# Patient Record
Sex: MALE | Race: OTHER | HISPANIC OR LATINO | URBAN - METROPOLITAN AREA
[De-identification: names, ages, dates, MRNs, and addresses within clinical notes are randomized per-mention and may not be internally consistent; named-entity substitution may affect disease eponyms.]

---

## 2017-06-27 ENCOUNTER — EMERGENCY (EMERGENCY)
Facility: HOSPITAL | Age: 67
LOS: 0 days | Discharge: HOME | End: 2017-06-27
Attending: EMERGENCY MEDICINE | Admitting: EMERGENCY MEDICINE
Payer: OTHER MISCELLANEOUS

## 2017-06-27 VITALS
RESPIRATION RATE: 16 BRPM | SYSTOLIC BLOOD PRESSURE: 164 MMHG | WEIGHT: 173.94 LBS | HEART RATE: 100 BPM | OXYGEN SATURATION: 97 % | DIASTOLIC BLOOD PRESSURE: 99 MMHG | TEMPERATURE: 99 F

## 2017-06-27 VITALS — HEIGHT: 68 IN

## 2017-06-27 DIAGNOSIS — Z79.82 LONG TERM (CURRENT) USE OF ASPIRIN: ICD-10-CM

## 2017-06-27 DIAGNOSIS — M79.89 OTHER SPECIFIED SOFT TISSUE DISORDERS: ICD-10-CM
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
## 2017-06-27 DIAGNOSIS — S49.91XA UNSPECIFIED INJURY OF RIGHT SHOULDER AND UPPER ARM, INITIAL ENCOUNTER: ICD-10-CM

## 2017-06-27 DIAGNOSIS — S40.021A CONTUSION OF RIGHT UPPER ARM, INITIAL ENCOUNTER: ICD-10-CM

## 2017-06-27 DIAGNOSIS — M79.621 PAIN IN RIGHT UPPER ARM: ICD-10-CM

## 2017-06-27 PROCEDURE — 99283 EMERGENCY DEPT VISIT LOW MDM: CPT

## 2017-06-27 PROCEDURE — 93971 EXTREMITY STUDY: CPT | Mod: 26,RT

## 2017-06-27 PROCEDURE — 73030 X-RAY EXAM OF SHOULDER: CPT | Mod: 26,RT

## 2017-06-27 NOTE — ED STATDOCS - PROGRESS NOTE DETAILS
66 yr. old male PMH: presents to ED with Right Upper extremity pain and bruising aftyer pulling a leaf blower yesterday. Reports he did not hit arm. Seen and examined by attending in Intake. Plan: X-Ray and pain medication. Will F/U with results and re evaluate. Yuridia COOK

## 2017-06-27 NOTE — ED ADULT TRIAGE NOTE - CHIEF COMPLAINT QUOTE
right upper arm bruising. pt states he pulled really hard trying to start a leaf blower yesterday and woke up with the bruising this morning. pt takes ASA.

## 2017-06-27 NOTE — ED STATDOCS - OBJECTIVE STATEMENT
67 y/o M presenting to ED for RUE bruising and pain after pulling a leaf blower yesterday. Pt did not strike RUE against any objects.

## 2017-07-29 ENCOUNTER — EMERGENCY (EMERGENCY)
Facility: HOSPITAL | Age: 67
LOS: 0 days | Discharge: HOME | End: 2017-07-29
Attending: EMERGENCY MEDICINE | Admitting: EMERGENCY MEDICINE
Payer: MEDICAID

## 2017-07-29 VITALS
TEMPERATURE: 98 F | SYSTOLIC BLOOD PRESSURE: 172 MMHG | HEART RATE: 94 BPM | RESPIRATION RATE: 20 BRPM | OXYGEN SATURATION: 100 % | DIASTOLIC BLOOD PRESSURE: 100 MMHG

## 2017-07-29 VITALS — WEIGHT: 173.06 LBS | HEIGHT: 63 IN

## 2017-07-29 DIAGNOSIS — G89.29 OTHER CHRONIC PAIN: ICD-10-CM

## 2017-07-29 DIAGNOSIS — R06.02 SHORTNESS OF BREATH: ICD-10-CM

## 2017-07-29 DIAGNOSIS — M25.562 PAIN IN LEFT KNEE: ICD-10-CM

## 2017-07-29 DIAGNOSIS — Z76.0 ENCOUNTER FOR ISSUE OF REPEAT PRESCRIPTION: ICD-10-CM

## 2017-07-29 DIAGNOSIS — I15.9 SECONDARY HYPERTENSION, UNSPECIFIED: ICD-10-CM

## 2017-07-29 DIAGNOSIS — R94.31 ABNORMAL ELECTROCARDIOGRAM [ECG] [EKG]: ICD-10-CM

## 2017-07-29 LAB
ALBUMIN SERPL ELPH-MCNC: 3.6 G/DL — SIGNIFICANT CHANGE UP (ref 3.3–5)
ALP SERPL-CCNC: 65 U/L — SIGNIFICANT CHANGE UP (ref 40–120)
ALT FLD-CCNC: 18 U/L — SIGNIFICANT CHANGE UP (ref 12–78)
ANION GAP SERPL CALC-SCNC: 6 MMOL/L — SIGNIFICANT CHANGE UP (ref 5–17)
AST SERPL-CCNC: 18 U/L — SIGNIFICANT CHANGE UP (ref 15–37)
BASOPHILS # BLD AUTO: 0.1 K/UL — SIGNIFICANT CHANGE UP (ref 0–0.2)
BASOPHILS NFR BLD AUTO: 1 % — SIGNIFICANT CHANGE UP (ref 0–2)
BILIRUB SERPL-MCNC: 0.5 MG/DL — SIGNIFICANT CHANGE UP (ref 0.2–1.2)
BUN SERPL-MCNC: 17 MG/DL — SIGNIFICANT CHANGE UP (ref 7–23)
CALCIUM SERPL-MCNC: 8.6 MG/DL — SIGNIFICANT CHANGE UP (ref 8.5–10.1)
CHLORIDE SERPL-SCNC: 106 MMOL/L — SIGNIFICANT CHANGE UP (ref 96–108)
CO2 SERPL-SCNC: 26 MMOL/L — SIGNIFICANT CHANGE UP (ref 22–31)
CREAT SERPL-MCNC: 0.91 MG/DL — SIGNIFICANT CHANGE UP (ref 0.5–1.3)
EOSINOPHIL # BLD AUTO: 0.4 K/UL — SIGNIFICANT CHANGE UP (ref 0–0.5)
EOSINOPHIL NFR BLD AUTO: 4.3 % — SIGNIFICANT CHANGE UP (ref 0–6)
GLUCOSE SERPL-MCNC: 100 MG/DL — HIGH (ref 70–99)
HCT VFR BLD CALC: 44.4 % — SIGNIFICANT CHANGE UP (ref 39–50)
HGB BLD-MCNC: 15.3 G/DL — SIGNIFICANT CHANGE UP (ref 13–17)
LYMPHOCYTES # BLD AUTO: 2.7 K/UL — SIGNIFICANT CHANGE UP (ref 1–3.3)
LYMPHOCYTES # BLD AUTO: 28.6 % — SIGNIFICANT CHANGE UP (ref 13–44)
MCHC RBC-ENTMCNC: 29.8 PG — SIGNIFICANT CHANGE UP (ref 27–34)
MCHC RBC-ENTMCNC: 34.4 GM/DL — SIGNIFICANT CHANGE UP (ref 32–36)
MCV RBC AUTO: 86.5 FL — SIGNIFICANT CHANGE UP (ref 80–100)
MONOCYTES # BLD AUTO: 0.9 K/UL — SIGNIFICANT CHANGE UP (ref 0–0.9)
MONOCYTES NFR BLD AUTO: 9.2 % — SIGNIFICANT CHANGE UP (ref 2–14)
NEUTROPHILS # BLD AUTO: 5.3 K/UL — SIGNIFICANT CHANGE UP (ref 1.8–7.4)
NEUTROPHILS NFR BLD AUTO: 57 % — SIGNIFICANT CHANGE UP (ref 43–77)
PLATELET # BLD AUTO: 306 K/UL — SIGNIFICANT CHANGE UP (ref 150–400)
POTASSIUM SERPL-MCNC: 3.9 MMOL/L — SIGNIFICANT CHANGE UP (ref 3.5–5.3)
POTASSIUM SERPL-SCNC: 3.9 MMOL/L — SIGNIFICANT CHANGE UP (ref 3.5–5.3)
PROT SERPL-MCNC: 6.9 GM/DL — SIGNIFICANT CHANGE UP (ref 6–8.3)
RBC # BLD: 5.13 M/UL — SIGNIFICANT CHANGE UP (ref 4.2–5.8)
RBC # FLD: 12.6 % — SIGNIFICANT CHANGE UP (ref 10.3–14.5)
SODIUM SERPL-SCNC: 138 MMOL/L — SIGNIFICANT CHANGE UP (ref 135–145)
WBC # BLD: 9.4 K/UL — SIGNIFICANT CHANGE UP (ref 3.8–10.5)
WBC # FLD AUTO: 9.4 K/UL — SIGNIFICANT CHANGE UP (ref 3.8–10.5)

## 2017-07-29 PROCEDURE — 73562 X-RAY EXAM OF KNEE 3: CPT | Mod: 26,LT

## 2017-07-29 PROCEDURE — 93010 ELECTROCARDIOGRAM REPORT: CPT

## 2017-07-29 PROCEDURE — 99285 EMERGENCY DEPT VISIT HI MDM: CPT

## 2017-07-29 PROCEDURE — 71020: CPT | Mod: 26

## 2017-07-29 RX ORDER — METFORMIN HYDROCHLORIDE 850 MG/1
1 TABLET ORAL
Qty: 0 | Refills: 0 | DISCHARGE
Start: 2017-07-29 | End: 2017-08-28

## 2017-07-29 RX ORDER — GABAPENTIN 400 MG/1
1 CAPSULE ORAL
Qty: 30 | Refills: 0 | OUTPATIENT
Start: 2017-07-29 | End: 2017-08-28

## 2017-07-29 RX ORDER — METFORMIN HYDROCHLORIDE 850 MG/1
1 TABLET ORAL
Qty: 30 | Refills: 0 | OUTPATIENT
Start: 2017-07-29 | End: 2017-08-28

## 2017-07-29 RX ORDER — AMLODIPINE BESYLATE 2.5 MG/1
5 TABLET ORAL ONCE
Qty: 0 | Refills: 0 | Status: COMPLETED | OUTPATIENT
Start: 2017-07-29 | End: 2017-07-29

## 2017-07-29 RX ORDER — AMLODIPINE BESYLATE 2.5 MG/1
2 TABLET ORAL
Qty: 60 | Refills: 0 | OUTPATIENT
Start: 2017-07-29 | End: 2017-08-28

## 2017-07-29 RX ORDER — CHLORTHALIDONE 50 MG
1 TABLET ORAL
Qty: 30 | Refills: 0 | OUTPATIENT
Start: 2017-07-29 | End: 2017-08-28

## 2017-07-29 RX ADMIN — AMLODIPINE BESYLATE 5 MILLIGRAM(S): 2.5 TABLET ORAL at 09:40

## 2017-07-29 NOTE — ED PROVIDER NOTE - CARE PLAN
Principal Discharge DX:	Chronic pain of left knee  Secondary Diagnosis:	Medication refill  Secondary Diagnosis:	Secondary hypertension

## 2017-07-29 NOTE — ED PROVIDER NOTE - MUSCULOSKELETAL, MLM
LLE - scars healed, distal n/v/m intact, no pedal edema.  Able to flex and extend at left knee without pain

## 2017-07-29 NOTE — ED PROVIDER NOTE - OBJECTIVE STATEMENT
[id number] used.  68 yo pt presents for not feeling well and out of his medication.  PMH for htn and hx of plates/metal in left leg.  Pt states his doctor is Dr. Salgado at [facility].  pt went to [facility] for 3 months and has since returned to ED.  Pt not feeling well and tried to call Dr. Salgado, but unable to get appointment.  pt statees that he is our of his medication and that is why he came to ED.  Pt also with chonic pain left leg, but has had a little more pain left knee.  No new trauma, no sick contact.  Nothing makes better/worse.

## 2017-07-29 NOTE — ED ADULT TRIAGE NOTE - CHIEF COMPLAINT QUOTE
sent in from [facility] for shortness of breath, uncontrolled HTN, and abnormal EKG

## 2017-07-29 NOTE — ED ADULT NURSE NOTE - OBJECTIVE STATEMENT
Pt states he is not short of breath but has run out of his medications and needs refills. He also states he has pain in his left foot. BP elevated upon admission, medicated as ordered. Color good

## 2017-09-03 ENCOUNTER — EMERGENCY (EMERGENCY)
Facility: HOSPITAL | Age: 67
LOS: 0 days | Discharge: HOME | End: 2017-09-03
Attending: EMERGENCY MEDICINE | Admitting: EMERGENCY MEDICINE
Payer: MEDICAID

## 2017-09-03 VITALS
OXYGEN SATURATION: 97 % | TEMPERATURE: 99 F | DIASTOLIC BLOOD PRESSURE: 92 MMHG | SYSTOLIC BLOOD PRESSURE: 149 MMHG | HEART RATE: 102 BPM | WEIGHT: 171.08 LBS | HEIGHT: 61.81 IN | RESPIRATION RATE: 19 BRPM

## 2017-09-03 DIAGNOSIS — I10 ESSENTIAL (PRIMARY) HYPERTENSION: ICD-10-CM

## 2017-09-03 DIAGNOSIS — H57.9 UNSPECIFIED DISORDER OF EYE AND ADNEXA: ICD-10-CM

## 2017-09-03 DIAGNOSIS — H11.31 CONJUNCTIVAL HEMORRHAGE, RIGHT EYE: ICD-10-CM

## 2017-09-03 PROCEDURE — 99283 EMERGENCY DEPT VISIT LOW MDM: CPT

## 2017-09-03 NOTE — ED STATDOCS - RIGHT EYE, MLM
blood injection to right medial eye. no pain on EOMI no foreign body. no abrasion noted. vision normal for pt.

## 2017-09-03 NOTE — ED STATDOCS - OBJECTIVE STATEMENT
68 y/o male pmhx HTN presents to ED due to right eye redness since yesterday. Pt yesterday was cutting his hair and beard and got alcohol in his eye. Redness developed later in the eye. Denies fever, vomiting.

## 2017-09-03 NOTE — ED STATDOCS - ATTENDING CONTRIBUTION TO CARE
I, Miguel Rodriguez, performed the initial face to face bedside interview with this patient regarding history of present illness, review of symptoms and relevant past medical, social and family history.  I completed an independent physical examination.  I was the initial provider who evaluated this patient. I have signed out the follow up of any pending tests (i.e. labs, radiological studies) to the ACP.  I have communicated the patient’s plan of care and disposition with the ACP.  The history, relevant review of systems, past medical and surgical history, medical decision making, and physical examination was documented by the scribe in my presence and I attest to the accuracy of the documentation.

## 2017-09-03 NOTE — ED STATDOCS - PROGRESS NOTE DETAILS
68 yo male presents with eye redness yesterday s/p possible alcohol in his eye when cutting his hair and beard. Denies pain, vision impairment or disturbance. Used fluorescein in the right eye to r/o abrasion or FB but was an unremarkable exam. No FB, abrasion, laceration. Will give ophtho f/u and artificial tears. -Deandre Puga PA-C

## 2017-09-03 NOTE — ED STATDOCS - TEMPLATE, MLM
CERTIFICATE OF WORK      February 2, 2017      RE:   Rubén Warefarheen Irwin  [address]  [place] [address]    To whom it may concern:    This is to certify that Rubén Reyes  has been under HERMAN Albrecht's care from 2/2/2017 and is excused from work on 2/2/2017 to 2/3/2017.        SIGNATURE:___________________________________________,   2/2/2017  Lanette Mosher, ALISSON/HERMAN Albrecht   [facility]  [facility]  [address] [place] [id number] [phone]       Abdominal pain VIEW ALL General

## 2017-09-03 NOTE — ED STATDOCS - MEDICAL DECISION MAKING DETAILS
probable subconjunctival hemorrhage. prescribe drops probable subconjunctival hemorrhage. give artificial tears.

## 2017-10-11 ENCOUNTER — APPOINTMENT (OUTPATIENT)
Dept: PULMONOLOGY | Facility: CLINIC | Age: 67
End: 2017-10-11
Payer: MEDICAID

## 2017-10-11 DIAGNOSIS — R06.02 SHORTNESS OF BREATH: ICD-10-CM

## 2017-10-11 PROCEDURE — 94010 BREATHING CAPACITY TEST: CPT

## 2017-10-11 PROCEDURE — 94729 DIFFUSING CAPACITY: CPT

## 2017-10-11 PROCEDURE — 94726 PLETHYSMOGRAPHY LUNG VOLUMES: CPT

## 2018-01-01 ENCOUNTER — OUTPATIENT (OUTPATIENT)
Dept: OUTPATIENT SERVICES | Facility: HOSPITAL | Age: 68
LOS: 1 days | End: 2018-01-01
Payer: MEDICAID

## 2018-01-10 DIAGNOSIS — R69 ILLNESS, UNSPECIFIED: ICD-10-CM

## 2018-06-01 PROCEDURE — G9005: CPT

## 2018-07-01 ENCOUNTER — OUTPATIENT (OUTPATIENT)
Dept: OUTPATIENT SERVICES | Facility: HOSPITAL | Age: 68
LOS: 1 days | End: 2018-07-01
Payer: MEDICAID

## 2018-08-01 PROCEDURE — G9005: CPT

## 2019-03-23 ENCOUNTER — INPATIENT (INPATIENT)
Facility: HOSPITAL | Age: 69
LOS: 9 days | Discharge: HOME HEALTH | End: 2019-04-02
Attending: HOSPITALIST | Admitting: HOSPITALIST
Payer: MEDICAID

## 2019-03-23 VITALS
HEIGHT: 65 IN | DIASTOLIC BLOOD PRESSURE: 140 MMHG | OXYGEN SATURATION: 100 % | RESPIRATION RATE: 22 BRPM | WEIGHT: 160.06 LBS | HEART RATE: 118 BPM | SYSTOLIC BLOOD PRESSURE: 224 MMHG | TEMPERATURE: 98 F

## 2019-03-23 LAB
ALBUMIN SERPL ELPH-MCNC: 3.9 G/DL — SIGNIFICANT CHANGE UP (ref 3.3–5)
ALP SERPL-CCNC: 67 U/L — SIGNIFICANT CHANGE UP (ref 40–120)
ALT FLD-CCNC: 16 U/L — SIGNIFICANT CHANGE UP (ref 12–78)
ANION GAP SERPL CALC-SCNC: 8 MMOL/L — SIGNIFICANT CHANGE UP (ref 5–17)
APPEARANCE UR: CLEAR — SIGNIFICANT CHANGE UP
APTT BLD: 30.9 SEC — SIGNIFICANT CHANGE UP (ref 27.5–36.3)
AST SERPL-CCNC: 19 U/L — SIGNIFICANT CHANGE UP (ref 15–37)
BACTERIA # UR AUTO: ABNORMAL
BILIRUB SERPL-MCNC: 0.6 MG/DL — SIGNIFICANT CHANGE UP (ref 0.2–1.2)
BILIRUB UR-MCNC: NEGATIVE — SIGNIFICANT CHANGE UP
BUN SERPL-MCNC: 13 MG/DL — SIGNIFICANT CHANGE UP (ref 7–23)
CALCIUM SERPL-MCNC: 9 MG/DL — SIGNIFICANT CHANGE UP (ref 8.5–10.1)
CHLORIDE SERPL-SCNC: 104 MMOL/L — SIGNIFICANT CHANGE UP (ref 96–108)
CK SERPL-CCNC: 85 U/L — SIGNIFICANT CHANGE UP (ref 26–308)
CO2 SERPL-SCNC: 25 MMOL/L — SIGNIFICANT CHANGE UP (ref 22–31)
COLOR SPEC: YELLOW — SIGNIFICANT CHANGE UP
CREAT SERPL-MCNC: 1.03 MG/DL — SIGNIFICANT CHANGE UP (ref 0.5–1.3)
DIFF PNL FLD: NEGATIVE — SIGNIFICANT CHANGE UP
EPI CELLS # UR: SIGNIFICANT CHANGE UP
GLUCOSE BLDC GLUCOMTR-MCNC: 102 MG/DL — HIGH (ref 70–99)
GLUCOSE BLDC GLUCOMTR-MCNC: 106 MG/DL — HIGH (ref 70–99)
GLUCOSE SERPL-MCNC: 119 MG/DL — HIGH (ref 70–99)
GLUCOSE UR QL: NEGATIVE MG/DL — SIGNIFICANT CHANGE UP
HCT VFR BLD CALC: 39.5 % — SIGNIFICANT CHANGE UP (ref 39–50)
HGB BLD-MCNC: 13.3 G/DL — SIGNIFICANT CHANGE UP (ref 13–17)
INR BLD: 1.01 RATIO — SIGNIFICANT CHANGE UP (ref 0.88–1.16)
KETONES UR-MCNC: NEGATIVE — SIGNIFICANT CHANGE UP
LEUKOCYTE ESTERASE UR-ACNC: NEGATIVE — SIGNIFICANT CHANGE UP
LIDOCAIN IGE QN: 163 U/L — SIGNIFICANT CHANGE UP (ref 73–393)
MCHC RBC-ENTMCNC: 29.9 PG — SIGNIFICANT CHANGE UP (ref 27–34)
MCHC RBC-ENTMCNC: 33.7 GM/DL — SIGNIFICANT CHANGE UP (ref 32–36)
MCV RBC AUTO: 88.8 FL — SIGNIFICANT CHANGE UP (ref 80–100)
NITRITE UR-MCNC: NEGATIVE — SIGNIFICANT CHANGE UP
NRBC # BLD: 0 /100 WBCS — SIGNIFICANT CHANGE UP (ref 0–0)
PH UR: 6 — SIGNIFICANT CHANGE UP (ref 5–8)
PLATELET # BLD AUTO: 349 K/UL — SIGNIFICANT CHANGE UP (ref 150–400)
POTASSIUM SERPL-MCNC: 3.8 MMOL/L — SIGNIFICANT CHANGE UP (ref 3.5–5.3)
POTASSIUM SERPL-SCNC: 3.8 MMOL/L — SIGNIFICANT CHANGE UP (ref 3.5–5.3)
PROT SERPL-MCNC: 7.8 GM/DL — SIGNIFICANT CHANGE UP (ref 6–8.3)
PROT UR-MCNC: 30 MG/DL
PROTHROM AB SERPL-ACNC: 11.2 SEC — SIGNIFICANT CHANGE UP (ref 10–12.9)
RBC # BLD: 4.45 M/UL — SIGNIFICANT CHANGE UP (ref 4.2–5.8)
RBC # FLD: 14.6 % — HIGH (ref 10.3–14.5)
RBC CASTS # UR COMP ASSIST: NEGATIVE /HPF — SIGNIFICANT CHANGE UP (ref 0–4)
SODIUM SERPL-SCNC: 137 MMOL/L — SIGNIFICANT CHANGE UP (ref 135–145)
SP GR SPEC: 1.01 — SIGNIFICANT CHANGE UP (ref 1.01–1.02)
TROPONIN I SERPL-MCNC: 0.03 NG/ML — SIGNIFICANT CHANGE UP (ref 0.01–0.04)
TROPONIN I SERPL-MCNC: <0.015 NG/ML — SIGNIFICANT CHANGE UP (ref 0.01–0.04)
UROBILINOGEN FLD QL: NEGATIVE MG/DL — SIGNIFICANT CHANGE UP
WBC # BLD: 11.02 K/UL — HIGH (ref 3.8–10.5)
WBC # FLD AUTO: 11.02 K/UL — HIGH (ref 3.8–10.5)
WBC UR QL: SIGNIFICANT CHANGE UP

## 2019-03-23 PROCEDURE — 70450 CT HEAD/BRAIN W/O DYE: CPT | Mod: 26

## 2019-03-23 PROCEDURE — 93010 ELECTROCARDIOGRAM REPORT: CPT

## 2019-03-23 PROCEDURE — 74176 CT ABD & PELVIS W/O CONTRAST: CPT | Mod: 26

## 2019-03-23 PROCEDURE — 99285 EMERGENCY DEPT VISIT HI MDM: CPT | Mod: 25

## 2019-03-23 PROCEDURE — 71045 X-RAY EXAM CHEST 1 VIEW: CPT | Mod: 26

## 2019-03-23 RX ORDER — DEXTROSE 50 % IN WATER 50 %
25 SYRINGE (ML) INTRAVENOUS ONCE
Qty: 0 | Refills: 0 | Status: DISCONTINUED | OUTPATIENT
Start: 2019-03-23 | End: 2019-04-02

## 2019-03-23 RX ORDER — LABETALOL HCL 100 MG
10 TABLET ORAL ONCE
Qty: 0 | Refills: 0 | Status: COMPLETED | OUTPATIENT
Start: 2019-03-23 | End: 2019-03-23

## 2019-03-23 RX ORDER — DEXTROSE 50 % IN WATER 50 %
15 SYRINGE (ML) INTRAVENOUS ONCE
Qty: 0 | Refills: 0 | Status: DISCONTINUED | OUTPATIENT
Start: 2019-03-23 | End: 2019-04-02

## 2019-03-23 RX ORDER — KETOROLAC TROMETHAMINE 30 MG/ML
30 SYRINGE (ML) INJECTION ONCE
Qty: 0 | Refills: 0 | Status: DISCONTINUED | OUTPATIENT
Start: 2019-03-23 | End: 2019-03-23

## 2019-03-23 RX ORDER — SODIUM CHLORIDE 9 MG/ML
1000 INJECTION, SOLUTION INTRAVENOUS
Qty: 0 | Refills: 0 | Status: DISCONTINUED | OUTPATIENT
Start: 2019-03-23 | End: 2019-04-02

## 2019-03-23 RX ORDER — GABAPENTIN 400 MG/1
100 CAPSULE ORAL AT BEDTIME
Qty: 0 | Refills: 0 | Status: DISCONTINUED | OUTPATIENT
Start: 2019-03-23 | End: 2019-04-02

## 2019-03-23 RX ORDER — MORPHINE SULFATE 50 MG/1
4 CAPSULE, EXTENDED RELEASE ORAL ONCE
Qty: 0 | Refills: 0 | Status: DISCONTINUED | OUTPATIENT
Start: 2019-03-23 | End: 2019-03-23

## 2019-03-23 RX ORDER — HYDRALAZINE HCL 50 MG
10 TABLET ORAL ONCE
Qty: 0 | Refills: 0 | Status: COMPLETED | OUTPATIENT
Start: 2019-03-23 | End: 2019-03-23

## 2019-03-23 RX ORDER — METOPROLOL TARTRATE 50 MG
25 TABLET ORAL DAILY
Qty: 0 | Refills: 0 | Status: DISCONTINUED | OUTPATIENT
Start: 2019-03-23 | End: 2019-03-24

## 2019-03-23 RX ORDER — AMLODIPINE BESYLATE 2.5 MG/1
5 TABLET ORAL DAILY
Qty: 0 | Refills: 0 | Status: DISCONTINUED | OUTPATIENT
Start: 2019-03-23 | End: 2019-04-02

## 2019-03-23 RX ORDER — SODIUM CHLORIDE 9 MG/ML
1000 INJECTION INTRAMUSCULAR; INTRAVENOUS; SUBCUTANEOUS ONCE
Qty: 0 | Refills: 0 | Status: COMPLETED | OUTPATIENT
Start: 2019-03-23 | End: 2019-03-23

## 2019-03-23 RX ORDER — CEFTRIAXONE 500 MG/1
1 INJECTION, POWDER, FOR SOLUTION INTRAMUSCULAR; INTRAVENOUS ONCE
Qty: 0 | Refills: 0 | Status: DISCONTINUED | OUTPATIENT
Start: 2019-03-23 | End: 2019-03-23

## 2019-03-23 RX ORDER — GLUCAGON INJECTION, SOLUTION 0.5 MG/.1ML
1 INJECTION, SOLUTION SUBCUTANEOUS ONCE
Qty: 0 | Refills: 0 | Status: DISCONTINUED | OUTPATIENT
Start: 2019-03-23 | End: 2019-04-02

## 2019-03-23 RX ORDER — CEFTRIAXONE 500 MG/1
1000 INJECTION, POWDER, FOR SOLUTION INTRAMUSCULAR; INTRAVENOUS ONCE
Qty: 0 | Refills: 0 | Status: COMPLETED | OUTPATIENT
Start: 2019-03-23 | End: 2019-03-23

## 2019-03-23 RX ORDER — AMLODIPINE BESYLATE 2.5 MG/1
5 TABLET ORAL ONCE
Qty: 0 | Refills: 0 | Status: COMPLETED | OUTPATIENT
Start: 2019-03-23 | End: 2019-03-23

## 2019-03-23 RX ORDER — HYDROCHLOROTHIAZIDE 25 MG
25 TABLET ORAL DAILY
Qty: 0 | Refills: 0 | Status: DISCONTINUED | OUTPATIENT
Start: 2019-03-23 | End: 2019-03-25

## 2019-03-23 RX ORDER — DEXTROSE 50 % IN WATER 50 %
12.5 SYRINGE (ML) INTRAVENOUS ONCE
Qty: 0 | Refills: 0 | Status: DISCONTINUED | OUTPATIENT
Start: 2019-03-23 | End: 2019-04-02

## 2019-03-23 RX ORDER — INSULIN LISPRO 100/ML
VIAL (ML) SUBCUTANEOUS
Qty: 0 | Refills: 0 | Status: DISCONTINUED | OUTPATIENT
Start: 2019-03-23 | End: 2019-04-02

## 2019-03-23 RX ORDER — HEPARIN SODIUM 5000 [USP'U]/ML
5000 INJECTION INTRAVENOUS; SUBCUTANEOUS EVERY 12 HOURS
Qty: 0 | Refills: 0 | Status: DISCONTINUED | OUTPATIENT
Start: 2019-03-23 | End: 2019-04-02

## 2019-03-23 RX ORDER — INFLUENZA VIRUS VACCINE 15; 15; 15; 15 UG/.5ML; UG/.5ML; UG/.5ML; UG/.5ML
0.5 SUSPENSION INTRAMUSCULAR ONCE
Qty: 0 | Refills: 0 | Status: COMPLETED | OUTPATIENT
Start: 2019-03-23 | End: 2019-04-01

## 2019-03-23 RX ADMIN — CEFTRIAXONE 1000 MILLIGRAM(S): 500 INJECTION, POWDER, FOR SOLUTION INTRAMUSCULAR; INTRAVENOUS at 08:00

## 2019-03-23 RX ADMIN — MORPHINE SULFATE 4 MILLIGRAM(S): 50 CAPSULE, EXTENDED RELEASE ORAL at 13:17

## 2019-03-23 RX ADMIN — SODIUM CHLORIDE 1000 MILLILITER(S): 9 INJECTION INTRAMUSCULAR; INTRAVENOUS; SUBCUTANEOUS at 08:00

## 2019-03-23 RX ADMIN — Medication 30 MILLIGRAM(S): at 08:00

## 2019-03-23 RX ADMIN — HEPARIN SODIUM 5000 UNIT(S): 5000 INJECTION INTRAVENOUS; SUBCUTANEOUS at 18:13

## 2019-03-23 RX ADMIN — AMLODIPINE BESYLATE 5 MILLIGRAM(S): 2.5 TABLET ORAL at 18:14

## 2019-03-23 RX ADMIN — Medication 10 MILLIGRAM(S): at 08:39

## 2019-03-23 RX ADMIN — AMLODIPINE BESYLATE 5 MILLIGRAM(S): 2.5 TABLET ORAL at 08:00

## 2019-03-23 RX ADMIN — GABAPENTIN 100 MILLIGRAM(S): 400 CAPSULE ORAL at 21:35

## 2019-03-23 RX ADMIN — Medication 30 MILLIGRAM(S): at 08:15

## 2019-03-23 RX ADMIN — Medication 25 MILLIGRAM(S): at 18:14

## 2019-03-23 RX ADMIN — MORPHINE SULFATE 4 MILLIGRAM(S): 50 CAPSULE, EXTENDED RELEASE ORAL at 06:54

## 2019-03-23 RX ADMIN — MORPHINE SULFATE 4 MILLIGRAM(S): 50 CAPSULE, EXTENDED RELEASE ORAL at 07:10

## 2019-03-23 RX ADMIN — Medication 10 MILLIGRAM(S): at 06:53

## 2019-03-23 RX ADMIN — SODIUM CHLORIDE 1000 MILLILITER(S): 9 INJECTION INTRAMUSCULAR; INTRAVENOUS; SUBCUTANEOUS at 09:00

## 2019-03-23 RX ADMIN — MORPHINE SULFATE 4 MILLIGRAM(S): 50 CAPSULE, EXTENDED RELEASE ORAL at 13:43

## 2019-03-23 RX ADMIN — Medication 25 MILLIGRAM(S): at 10:35

## 2019-03-23 RX ADMIN — Medication 10 MILLIGRAM(S): at 10:35

## 2019-03-23 NOTE — ED PROVIDER NOTE - OBJECTIVE STATEMENT
68 y/o male in ED c/o worsening dysuria, suprapubic pain x 3 days now with retention tonight.   pt denies any previous episodes.   pt denies any fever, HA, cp, sob, n/v/d or flank pain.   states [place] from [place] with [place] relief.  states burning pain

## 2019-03-23 NOTE — ED ADULT NURSE NOTE - OBJECTIVE STATEMENT
pt presents to ed a/ox3, with complaint of pain when urinating. pt reports pain started 2-3days ago. pt reports constant burning at tip of penis and burning while urinating. pt denies penial discharge, rash, abnormal findings. pt urine light yellow clear, and no sediments noted. pt denies fever,chills, n/v/d, sob, CP. pt reports hx of HTN, DM. pt hypertensive in ED, pt states he did not take his medications today, unsure of what medication he takes at home. upon assessment no abnormal findings , pt has tenderness at suprapubic area upon palpation.

## 2019-03-23 NOTE — H&P ADULT - ASSESSMENT
70 yo male with Hx. of HTN, DM2, who presented in the ER for  dysuria, L flank pain , suprapubic pain for 3 days.  He was foung with  severe HTN in the ER and referred for telemetry admission.   Assessment:                        1.Uncontrolled HTN                       2. Hydronephrosis                       3. DM2                       4. Cerebral Ischemia,    Plan:    admit to : telemetry                medications as per med rec. received Bp meds and Ceftriaxone in the ED. , resume home medications.                Insulin Coverage..                Labs: order cbc,bmp,                Radilogy:               Cardiac diagnostics:                Consults: ID, Urology, Neurology.

## 2019-03-23 NOTE — H&P ADULT - HISTORY OF PRESENT ILLNESS
HPI: The patient is a 70 yo male with Hx. of HTN, DM2, who presented in the ER for  dysuria, L flank pain , suprapubic pain for 3 days.  He was foung with  severe HTN in the ER and referred for telemetry admission.     PMHx: HTN, DM2,    PSHx: LLE    Family Hx:the patient Mother   of uterine CA,  The patient father  of prostate CA.     Social Hx.: not smoking, no alcohol use    ROS:     Eyes: no changes in vision    ENT/Mouth: no changes    Cardiovascular: no chest pain    Respiratory: no SOB    Gastrointestinal: no diarrhea, no nausea,     Genitourinary: has  dysuria,     Breast: no pain    Musculoskeletal: no pain    Integumentary: no itching    Neurological: No Headache, no tremor,    Psychiatric: no suicidal ideations    Endocrine: no excessive thirst,     Hematologic/Lymphatic: no swollen glands    Allergic/Immunologic: no itching    Physical Exam:Physical Exam: Vital Signs Last 24 Hrs  T(C): 36.7 (23 Mar 2019 13:09), Max: 36.7 (23 Mar 2019 05:55)  T(F): 98.1 (23 Mar 2019 13:09), Max: 98.1 (23 Mar 2019 13:09)  HR: 92 (23 Mar 2019 15:22) (89 - 118)  BP: 160/109 (23 Mar 2019 15:22) (146/103 - 224/140)  BP(mean): --  RR: 16 (23 Mar 2019 15:22) (15 - 24)  SpO2: 95% (23 Mar 2019 15:22) (94% - 100%)        HEENT: PRRL EOMI    MOUTH/TEETH/GUMS: Clear    NECK: no JVD    LUNGS: Clear    HEART: S1,S2 RR    ABDOMEN: soft nontender    EXTREMITIES: no pedal edema,     MUSCULOSKELETAL: no joint swelling,     NEURO: no tremor, no focal signs.    SKIN: no rash    : CVA negative,     Lab:                       13.3   11.02 )-----------( 349      ( 23 Mar 2019 06:45 )             39.5       03-    137  |  104  |  13  ----------------------------<  119<H>  3.8   |  25  |  1.03    Ca    9.0      23 Mar 2019 06:45    TPro  7.8  /  Alb  3.9  /  TBili  0.6  /  DBili  x   /  AST  19  /  ALT  16  /  AlkPhos  67  03-23    UA  wbc 0-2, Rbc neg,  bact. Occ.   CTH: Mild encephalomalacia and gliosis in the LEFT frontal and   temporal lobes, likely sequela of prior trauma. Mild anterior   periventricular and bifrontal some cortical white matter ischemia is   noted.   Small retention cyst in RIGHT sphenoid sinus.    CT Abdomen/Pelvis:  Mild bilateral hydroureteronephrosis without significant perinephric   stranding. 3 mm bladder stone near the right UVJ,? Recently passed versus   chronic. Perivesicular fat stranding concerning for cystitis. Correlation   with urinalysis is recommended.

## 2019-03-23 NOTE — ED ADULT NURSE REASSESSMENT NOTE - NS ED NURSE REASSESS COMMENT FT1
Patient A&Ox4, resting in bed. Hypertensive, reports severe groin & penis pain; MD Montalvo notified, new orders received. Hospitalist consult pending. Safety & comfort measures in place, family bedside. Will continue to monitor.

## 2019-03-23 NOTE — ED ADULT NURSE NOTE - NSIMPLEMENTINTERV_GEN_ALL_ED
Implemented All Universal Safety Interventions:  [place] to call system. Call bell, personal items and telephone within reach. Instruct patient to call for assistance. Room bathroom lighting operational. Non-slip footwear when patient is off stretcher. Physically safe environment: no spills, clutter or unnecessary equipment. Stretcher in lowest position, wheels locked, appropriate side rails in place.

## 2019-03-23 NOTE — ED PROVIDER NOTE - CLINICAL SUMMARY MEDICAL DECISION MAKING FREE TEXT BOX
70 y/o male in ED c/o abd pain with dysuria and retention possible uti vs stone.   will check CT, labs, UA, meds and reeval

## 2019-03-23 NOTE — ED PROVIDER NOTE - PROGRESS NOTE DETAILS
updated pt and family with kidney stone, pt bp remains elevated despite medication will admit pt for hypertensive urgency, spoke with hosptialist Dr. Arti Montalvo DO

## 2019-03-24 DIAGNOSIS — I10 ESSENTIAL (PRIMARY) HYPERTENSION: ICD-10-CM

## 2019-03-24 DIAGNOSIS — N23 UNSPECIFIED RENAL COLIC: ICD-10-CM

## 2019-03-24 DIAGNOSIS — N15.9 RENAL TUBULO-INTERSTITIAL DISEASE, UNSPECIFIED: ICD-10-CM

## 2019-03-24 DIAGNOSIS — R06.09 OTHER FORMS OF DYSPNEA: ICD-10-CM

## 2019-03-24 LAB
ANION GAP SERPL CALC-SCNC: 12 MMOL/L — SIGNIFICANT CHANGE UP (ref 5–17)
BASOPHILS # BLD AUTO: 0.05 K/UL — SIGNIFICANT CHANGE UP (ref 0–0.2)
BASOPHILS NFR BLD AUTO: 0.4 % — SIGNIFICANT CHANGE UP (ref 0–2)
BUN SERPL-MCNC: 14 MG/DL — SIGNIFICANT CHANGE UP (ref 7–23)
CALCIUM SERPL-MCNC: 8.1 MG/DL — LOW (ref 8.5–10.1)
CHLORIDE SERPL-SCNC: 102 MMOL/L — SIGNIFICANT CHANGE UP (ref 96–108)
CO2 SERPL-SCNC: 23 MMOL/L — SIGNIFICANT CHANGE UP (ref 22–31)
CREAT SERPL-MCNC: 1.38 MG/DL — HIGH (ref 0.5–1.3)
EOSINOPHIL # BLD AUTO: 0.2 K/UL — SIGNIFICANT CHANGE UP (ref 0–0.5)
EOSINOPHIL NFR BLD AUTO: 1.7 % — SIGNIFICANT CHANGE UP (ref 0–6)
GLUCOSE BLDC GLUCOMTR-MCNC: 113 MG/DL — HIGH (ref 70–99)
GLUCOSE BLDC GLUCOMTR-MCNC: 115 MG/DL — HIGH (ref 70–99)
GLUCOSE BLDC GLUCOMTR-MCNC: 116 MG/DL — HIGH (ref 70–99)
GLUCOSE BLDC GLUCOMTR-MCNC: 169 MG/DL — HIGH (ref 70–99)
GLUCOSE SERPL-MCNC: 104 MG/DL — HIGH (ref 70–99)
HBA1C BLD-MCNC: 5.7 % — HIGH (ref 4–5.6)
HCT VFR BLD CALC: 36.6 % — LOW (ref 39–50)
HCV AB S/CO SERPL IA: 0.09 S/CO — SIGNIFICANT CHANGE UP (ref 0–0.79)
HCV AB SERPL-IMP: SIGNIFICANT CHANGE UP
HGB BLD-MCNC: 12.5 G/DL — LOW (ref 13–17)
IMM GRANULOCYTES NFR BLD AUTO: 0.8 % — SIGNIFICANT CHANGE UP (ref 0–1.5)
LYMPHOCYTES # BLD AUTO: 1.6 K/UL — SIGNIFICANT CHANGE UP (ref 1–3.3)
LYMPHOCYTES # BLD AUTO: 13.3 % — SIGNIFICANT CHANGE UP (ref 13–44)
MCHC RBC-ENTMCNC: 29.8 PG — SIGNIFICANT CHANGE UP (ref 27–34)
MCHC RBC-ENTMCNC: 34.2 GM/DL — SIGNIFICANT CHANGE UP (ref 32–36)
MCV RBC AUTO: 87.4 FL — SIGNIFICANT CHANGE UP (ref 80–100)
MONOCYTES # BLD AUTO: 0.89 K/UL — SIGNIFICANT CHANGE UP (ref 0–0.9)
MONOCYTES NFR BLD AUTO: 7.4 % — SIGNIFICANT CHANGE UP (ref 2–14)
NEUTROPHILS # BLD AUTO: 9.18 K/UL — HIGH (ref 1.8–7.4)
NEUTROPHILS NFR BLD AUTO: 76.4 % — SIGNIFICANT CHANGE UP (ref 43–77)
NRBC # BLD: 0 /100 WBCS — SIGNIFICANT CHANGE UP (ref 0–0)
PLATELET # BLD AUTO: 315 K/UL — SIGNIFICANT CHANGE UP (ref 150–400)
POTASSIUM SERPL-MCNC: 3 MMOL/L — LOW (ref 3.5–5.3)
POTASSIUM SERPL-SCNC: 3 MMOL/L — LOW (ref 3.5–5.3)
RBC # BLD: 4.19 M/UL — LOW (ref 4.2–5.8)
RBC # FLD: 14.6 % — HIGH (ref 10.3–14.5)
SODIUM SERPL-SCNC: 137 MMOL/L — SIGNIFICANT CHANGE UP (ref 135–145)
WBC # BLD: 12.02 K/UL — HIGH (ref 3.8–10.5)
WBC # FLD AUTO: 12.02 K/UL — HIGH (ref 3.8–10.5)

## 2019-03-24 PROCEDURE — 99252 IP/OBS CONSLTJ NEW/EST SF 35: CPT

## 2019-03-24 PROCEDURE — 99222 1ST HOSP IP/OBS MODERATE 55: CPT

## 2019-03-24 RX ORDER — PIPERACILLIN AND TAZOBACTAM 4; .5 G/20ML; G/20ML
3.38 INJECTION, POWDER, LYOPHILIZED, FOR SOLUTION INTRAVENOUS EVERY 8 HOURS
Qty: 0 | Refills: 0 | Status: DISCONTINUED | OUTPATIENT
Start: 2019-03-24 | End: 2019-03-25

## 2019-03-24 RX ORDER — METOPROLOL TARTRATE 50 MG
50 TABLET ORAL DAILY
Qty: 0 | Refills: 0 | Status: DISCONTINUED | OUTPATIENT
Start: 2019-03-25 | End: 2019-04-02

## 2019-03-24 RX ORDER — METOPROLOL TARTRATE 50 MG
25 TABLET ORAL ONCE
Qty: 0 | Refills: 0 | Status: COMPLETED | OUTPATIENT
Start: 2019-03-24 | End: 2019-03-24

## 2019-03-24 RX ORDER — METOPROLOL TARTRATE 50 MG
25 TABLET ORAL DAILY
Qty: 0 | Refills: 0 | Status: DISCONTINUED | OUTPATIENT
Start: 2019-03-24 | End: 2019-03-24

## 2019-03-24 RX ORDER — POTASSIUM CHLORIDE 20 MEQ
40 PACKET (EA) ORAL EVERY 4 HOURS
Qty: 0 | Refills: 0 | Status: COMPLETED | OUTPATIENT
Start: 2019-03-24 | End: 2019-03-24

## 2019-03-24 RX ORDER — PIPERACILLIN AND TAZOBACTAM 4; .5 G/20ML; G/20ML
3.38 INJECTION, POWDER, LYOPHILIZED, FOR SOLUTION INTRAVENOUS ONCE
Qty: 0 | Refills: 0 | Status: COMPLETED | OUTPATIENT
Start: 2019-03-24 | End: 2019-03-24

## 2019-03-24 RX ORDER — SODIUM CHLORIDE 9 MG/ML
1000 INJECTION INTRAMUSCULAR; INTRAVENOUS; SUBCUTANEOUS
Qty: 0 | Refills: 0 | Status: DISCONTINUED | OUTPATIENT
Start: 2019-03-24 | End: 2019-03-26

## 2019-03-24 RX ORDER — ACETAMINOPHEN 500 MG
650 TABLET ORAL EVERY 6 HOURS
Qty: 0 | Refills: 0 | Status: DISCONTINUED | OUTPATIENT
Start: 2019-03-24 | End: 2019-04-02

## 2019-03-24 RX ORDER — CEFTRIAXONE 500 MG/1
1000 INJECTION, POWDER, FOR SOLUTION INTRAMUSCULAR; INTRAVENOUS EVERY 24 HOURS
Qty: 0 | Refills: 0 | Status: DISCONTINUED | OUTPATIENT
Start: 2019-03-24 | End: 2019-03-24

## 2019-03-24 RX ADMIN — Medication 25 MILLIGRAM(S): at 18:03

## 2019-03-24 RX ADMIN — PIPERACILLIN AND TAZOBACTAM 200 GRAM(S): 4; .5 INJECTION, POWDER, LYOPHILIZED, FOR SOLUTION INTRAVENOUS at 18:52

## 2019-03-24 RX ADMIN — GABAPENTIN 100 MILLIGRAM(S): 400 CAPSULE ORAL at 21:36

## 2019-03-24 RX ADMIN — Medication 650 MILLIGRAM(S): at 18:53

## 2019-03-24 RX ADMIN — Medication 25 MILLIGRAM(S): at 05:30

## 2019-03-24 RX ADMIN — HEPARIN SODIUM 5000 UNIT(S): 5000 INJECTION INTRAVENOUS; SUBCUTANEOUS at 05:30

## 2019-03-24 RX ADMIN — Medication 40 MILLIEQUIVALENT(S): at 15:34

## 2019-03-24 RX ADMIN — Medication 40 MILLIEQUIVALENT(S): at 11:16

## 2019-03-24 RX ADMIN — CEFTRIAXONE 1000 MILLIGRAM(S): 500 INJECTION, POWDER, FOR SOLUTION INTRAMUSCULAR; INTRAVENOUS at 11:14

## 2019-03-24 RX ADMIN — PIPERACILLIN AND TAZOBACTAM 25 GRAM(S): 4; .5 INJECTION, POWDER, LYOPHILIZED, FOR SOLUTION INTRAVENOUS at 21:36

## 2019-03-24 RX ADMIN — AMLODIPINE BESYLATE 5 MILLIGRAM(S): 2.5 TABLET ORAL at 05:30

## 2019-03-24 RX ADMIN — HEPARIN SODIUM 5000 UNIT(S): 5000 INJECTION INTRAVENOUS; SUBCUTANEOUS at 17:58

## 2019-03-24 RX ADMIN — SODIUM CHLORIDE 85 MILLILITER(S): 9 INJECTION INTRAMUSCULAR; INTRAVENOUS; SUBCUTANEOUS at 11:03

## 2019-03-24 NOTE — CONSULT NOTE ADULT - SUBJECTIVE AND OBJECTIVE BOX
Patient is a 69y old  Male who presents with a chief complaint of HTN, urine retention, hydronephrosis, bladder stone, (24 Mar 2019 10:04)    HPI:  HPI: The patient is a 70 yo male with Hx. of HTN, DM2, who presented in the ER for dysuria, L flank pain, suprapubic pain for 3 days.  He was found with  severe HTN in the ER and referred for telemetry admission. also found with wbc ct 11-12, UA unremarkable CT abd/pelvis with perivesicular fat stranding, cystitis, b/l hydroureteronephrosis, 3 mm bladder stone in R UVJ ? passed stone was given IV rocephin for UTI.     PMHx: HTN, DM2,    PSH: as above  Meds: per reconciliation sheet, noted below  MEDICATIONS  (STANDING):  amLODIPine   Tablet 5 milliGRAM(s) Oral daily  cefTRIAXone Injectable. 1000 milliGRAM(s) IV Push every 24 hours  dextrose 5%. 1000 milliLiter(s) (50 mL/Hr) IV Continuous <Continuous>  dextrose 50% Injectable 12.5 Gram(s) IV Push once  dextrose 50% Injectable 25 Gram(s) IV Push once  dextrose 50% Injectable 25 Gram(s) IV Push once  gabapentin 100 milliGRAM(s) Oral at bedtime  heparin  Injectable 5000 Unit(s) SubCutaneous every 12 hours  hydrochlorothiazide 25 milliGRAM(s) Oral daily  influenza   Vaccine 0.5 milliLiter(s) IntraMuscular once  insulin lispro (HumaLOG) corrective regimen sliding scale   SubCutaneous three times a day before meals  metoprolol succinate ER 25 milliGRAM(s) Oral daily  potassium chloride    Tablet ER 40 milliEquivalent(s) Oral every 4 hours  sodium chloride 0.9%. 1000 milliLiter(s) (85 mL/Hr) IV Continuous <Continuous>    Allergies    No Known Allergies    Intolerances      Social: no smoking, no alcohol, no illegal drugs; no recent travel, no exposure to TB  FAMILY HISTORY:  No pertinent family history in first degree relatives     no history of premature cardiovascular disease in first degree relatives    ROS: the patient denies fever, no chills, no HA, no dizziness, no sore throat, no blurry vision, no CP, no palpitations, no SOB, no cough, no abdominal pain, no diarrhea, no leg pain, no claudication, no rash, no joint aches, no rectal pain or bleeding, no night sweats  All other systems reviewed and are negative    Vital Signs Last 24 Hrs  T(C): 37.6 (24 Mar 2019 10:09), Max: 37.6 (24 Mar 2019 10:09)  T(F): 99.6 (24 Mar 2019 10:09), Max: 99.6 (24 Mar 2019 10:09)  HR: 99 (24 Mar 2019 10:09) (89 - 106)  BP: 157/88 (24 Mar 2019 10:09) (143/90 - 178/93)  BP(mean): --  RR: 18 (24 Mar 2019 10:09) (15 - 18)  SpO2: 91% (24 Mar 2019 10:09) (91% - 98%)  Daily     Daily     PE:    Constitutional: frail looking  HEENT: NC/AT, EOMI, PERRLA, conjunctivae clear; ears and nose atraumatic; pharynx benign  Neck: supple; thyroid not palpable  Back: no tenderness  Respiratory: respiratory effort normal; clear to auscultation  Cardiovascular: S1S2 regular, no murmurs  Abdomen: soft, not tender, not distended, positive BS; liver and spleen WNL  Genitourinary:  suprapubic tenderness  Lymphatic: no LN palpable  Musculoskeletal: no muscle tenderness, no joint swelling or tenderness  Extremities: no pedal edema  Neurological/ Psychiatric: moving all extremities  Skin: no rashes; no palpable lesions    Labs: all available labs reviewed                        12.5   12.02 )-----------( 315      ( 24 Mar 2019 05:29 )             36.6     03-24    137  |  102  |  14  ----------------------------<  104<H>  3.0<L>   |  23  |  1.38<H>    Ca    8.1<L>      24 Mar 2019 05:29    TPro  7.8  /  Alb  3.9  /  TBili  0.6  /  DBili  x   /  AST  19  /  ALT  16  /  AlkPhos  67  03-23     LIVER FUNCTIONS - ( 23 Mar 2019 06:45 )  Alb: 3.9 g/dL / Pro: 7.8 gm/dL / ALK PHOS: 67 U/L / ALT: 16 U/L / AST: 19 U/L / GGT: x           Urinalysis Basic - ( 23 Mar 2019 06:45 )    Color: Yellow / Appearance: Clear / S.015 / pH: x  Gluc: x / Ketone: Negative  / Bili: Negative / Urobili: Negative mg/dL   Blood: x / Protein: 30 mg/dL / Nitrite: Negative   Leuk Esterase: Negative / RBC: Negative /HPF / WBC 0-2   Sq Epi: x / Non Sq Epi: Occasional / Bacteria: Occasional          Radiology: all available radiological tests reviewed    EXAM:  CT BRAIN                            PROCEDURE DATE:  2019          INTERPRETATION:      CT head without IV contrast        CLINICAL INFORMATION:        Headache hypertension    TECHNIQUE: Contiguous axial 5 mm sections were obtained through the head.   Sagittal and coronal 2-D reformatted images were also obtained.   This   scan was performed using automatic exposure control (radiation dose   reduction software) to obtain a diagnostic image quality scan with   patient dose as low as reasonably achievable.     FINDINGS:   No previous examinations are available for review.    The brain demonstrates encephalomalacia and gliosis in the LEFT frontal   and temporal lobes, likely sequela of prior trauma. Mild anterior   periventricular and bifrontal some cortical white matter ischemia is   noted.   No acute cerebral cortical infarct is seen.  No intracranial   hemorrhage is found.  No mass effect is found in the brain.      The ventricles, sulci and basal cisterns appear unremarkable.         The orbits are unremarkable.  The paranasal sinuses are significant for   small retention cyst in the RIGHT sphenoid sinus.  The nasal cavity   appears intact.  The nasopharynx is symmetric.  The central skull base,   petrous temporal bones and calvarium remain intact.      IMPRESSION:   Mild encephalomalacia and gliosis in the LEFT frontal and   temporal lobes, likely sequela of prior trauma. Mild anterior   periventricular and bifrontal some cortical white matter ischemia is   noted.   Small retention cyst in RIGHT sphenoid sinus.      < from: CT Abdomen and Pelvis No Cont (19 @ 07:30) >  EXAM:  CT ABDOMEN AND PELVIS                            PROCEDURE DATE:  2019          INTERPRETATION:  HISTORY: Pain and difficulty urinating. Evaluate for   stone.    TECHNIQUE: CT scan of the abdomen and pelvis was performed from the domes  of the diaphragm to the symphysis pubis without oral or intravenous   contrast.  Coronal and sagittal reformatted images are provided.    COMPARISON: There is no prior study for comparison.    FINDINGS:   The lung bases are clear. The heart is not enlarged.    Evaluation of the parenchymal organs and vascular structures is limited   without intravenous contrast.     There is mild right hydroureteronephrosis secondary to a 3 mm stone near   the UVJ within the bladder. There is mild left hydroureteronephrosis   without obstructing ureteral calculus. There is no significant   perinephric stranding. The unenhanced appearance of the liver,   gallbladder, spleen, pancreas and adrenal glands is unremarkable. Small   left renal cyst is present.    Is no bowel obstruction, free air or ascites. The appendix is normal.   There is descending and sigmoid colon diverticulosis without evidence of   diverticulitis.    The abdominal aorta is normal in caliber. There is no retroperitoneal,   pelvic or inguinal adenopathy.     Mild the bladder is not thick-walled there is perivesicular fat stranding   suggesting inflammation. The prostate gland is enlarged.    There are no acute osseous abnormalities. There our degenerative changes   in the lumbar spine.    IMPRESSION:   Mild bilateral hydroureteronephrosis without significant perinephric   stranding. 3 mm bladder stone near the right UVJ,? Recently passed versus   chronic. Perivesicular fat stranding concerning for cystitis. Correlation   with urinalysis is recommended.          Advanced directives addressed: full resuscitation

## 2019-03-24 NOTE — CONSULT NOTE ADULT - ASSESSMENT
70 yo male with Hx. of HTN, DM2, who presented in the ER for dysuria, L flank pain, suprapubic pain for 3 days.  He was found with  severe HTN in the ER and referred for telemetry admission. also found with wbc ct 11-12, UA unremarkable CT abd/pelvis with perivesicular fat stranding, cystitis, b/l hydroureteronephrosis, 3 mm bladder stone in R UVJ ? passed stone was given IV rocephin for UTI.     1. cystitis. bladder stone. urinary retention. edilma  - agree with rocephin 1gmqdaily   - ? passed stone, slowly improving  - f/u urine cx/blood cx  - monitor temps  - tolerating abx well so far; no side effects noted  - reason for abx use and side effects reviewed with patient  - supportive care  - f/u cbc    2. other issues - HTN/DM - care per medicine

## 2019-03-24 NOTE — PROGRESS NOTE ADULT - SUBJECTIVE AND OBJECTIVE BOX
CHIEF COMPLAINT/Diagnosis:     SUBJECTIVE:     REVIEW OF SYSTEMS:    CONSTITUTIONAL: No weakness, fevers or chills  EYES/ENT: No visual changes;  No vertigo or throat pain   NECK: No pain or stiffness  RESPIRATORY: No cough, wheezing, hemoptysis; No shortness of breath  CARDIOVASCULAR: No chest pain or palpitations  GASTROINTESTINAL: No abdominal or epigastric pain. No nausea, vomiting, or hematemesis; No diarrhea or constipation. No melena or hematochezia.  GENITOURINARY: No dysuria, frequency or hematuria  NEUROLOGICAL: No numbness or weakness  SKIN: No itching, burning, rashes, or lesions   All other review of systems is negative unless indicated above    Vital Signs Last 24 Hrs  T(C): 36.6 (24 Mar 2019 05:15), Max: 37.1 (23 Mar 2019 16:52)  T(F): 97.9 (24 Mar 2019 05:15), Max: 98.7 (23 Mar 2019 16:52)  HR: 106 (24 Mar 2019 06:04) (89 - 106)  BP: 156/88 (24 Mar 2019 06:04) (143/90 - 178/93)  BP(mean): --  RR: 18 (24 Mar 2019 05:15) (15 - 18)  SpO2: 94% (24 Mar 2019 05:15) (94% - 98%)    I&O's Summary    23 Mar 2019 07:01  -  24 Mar 2019 07:00  --------------------------------------------------------  IN: 1000 mL / OUT: 300 mL / NET: 700 mL        CAPILLARY BLOOD GLUCOSE      POCT Blood Glucose.: 113 mg/dL (24 Mar 2019 08:02)  POCT Blood Glucose.: 106 mg/dL (23 Mar 2019 21:41)  POCT Blood Glucose.: 102 mg/dL (23 Mar 2019 16:49)      PHYSICAL EXAM:    Constitutional: NAD, awake and alert, well-developed  HEENT: PERR, EOMI, Normal Hearing, MMM  Neck: Soft and supple, No LAD, No JVD  Respiratory: Breath sounds are clear bilaterally, No wheezing, rales or rhonchi  Cardiovascular: S1 and S2, regular rate and rhythm, no Murmurs, gallops or rubs  Gastrointestinal: Bowel Sounds present, soft, nontender, nondistended, no guarding, no rebound  Extremities: No peripheral edema  Vascular: 2+ peripheral pulses  Neurological: A/O x 3, no focal deficits  Musculoskeletal: 5/5 strength b/l upper and lower extremities  Skin: No rashes    MEDICATIONS:  MEDICATIONS  (STANDING):  amLODIPine   Tablet 5 milliGRAM(s) Oral daily  cefTRIAXone Injectable. 1000 milliGRAM(s) IV Push every 24 hours  dextrose 5%. 1000 milliLiter(s) (50 mL/Hr) IV Continuous <Continuous>  dextrose 50% Injectable 12.5 Gram(s) IV Push once  dextrose 50% Injectable 25 Gram(s) IV Push once  dextrose 50% Injectable 25 Gram(s) IV Push once  gabapentin 100 milliGRAM(s) Oral at bedtime  heparin  Injectable 5000 Unit(s) SubCutaneous every 12 hours  hydrochlorothiazide 25 milliGRAM(s) Oral daily  influenza   Vaccine 0.5 milliLiter(s) IntraMuscular once  insulin lispro (HumaLOG) corrective regimen sliding scale   SubCutaneous three times a day before meals  metoprolol succinate ER 25 milliGRAM(s) Oral daily      LABS: All Labs Reviewed:                        12.5   12.02 )-----------( 315      ( 24 Mar 2019 05:29 )             36.6     03-24    137  |  102  |  14  ----------------------------<  104<H>  3.0<L>   |  23  |  1.38<H>    Ca    8.1<L>      24 Mar 2019 05:29    TPro  7.8  /  Alb  3.9  /  TBili  0.6  /  DBili  x   /  AST  19  /  ALT  16  /  AlkPhos  67  03-23    PT/INR - ( 23 Mar 2019 06:45 )   PT: 11.2 sec;   INR: 1.01 ratio         PTT - ( 23 Mar 2019 06:45 )  PTT:30.9 sec  CARDIAC MARKERS ( 23 Mar 2019 11:33 )  0.026 ng/mL / x     / 85 U/L / x     / x      CARDIAC MARKERS ( 23 Mar 2019 06:45 )  <0.015 ng/mL / x     / x     / x     / x          Assessment:    70 yo male with Hx. of HTN, DM2, who presented in the ER for  dysuria, L flank pain , suprapubic pain for 3 days.  He was foung with  severe HTN in the ER and referred for telemetry admission.   Assessment:                        1.Uncontrolled HTN                       2. Hydronephrosis                       3. DM2                       4. Cerebral Ischemia,    Plan:    admit to : telemetry                medications as per med rec. received Bp meds and Ceftriaxone in the ED. , resume home medications.                Insulin Coverage..                Labs: order cbc,bmp,                Radilogy:               Cardiac diagnostics:                Consults: ID, Urology, Neurology. CHIEF COMPLAINT/Diagnosis: UTI, possible pyelo, sepsis, obstructive uropathy    SUBJECTIVE: no complaints    REVIEW OF SYSTEMS:    CONSTITUTIONAL: No weakness, fevers or chills  EYES/ENT: No visual changes;  No vertigo or throat pain   NECK: No pain or stiffness  RESPIRATORY: No cough, wheezing, hemoptysis; No shortness of breath  CARDIOVASCULAR: No chest pain or palpitations  GASTROINTESTINAL: No abdominal or epigastric pain. No nausea, vomiting, or hematemesis; No diarrhea or constipation. No melena or hematochezia.  GENITOURINARY: No dysuria, frequency or hematuria  NEUROLOGICAL: No numbness or weakness  SKIN: No itching, burning, rashes, or lesions   All other review of systems is negative unless indicated above    Vital Signs Last 24 Hrs  T(C): 36.6 (24 Mar 2019 05:15), Max: 37.1 (23 Mar 2019 16:52)  T(F): 97.9 (24 Mar 2019 05:15), Max: 98.7 (23 Mar 2019 16:52)  HR: 106 (24 Mar 2019 06:04) (89 - 106)  BP: 156/88 (24 Mar 2019 06:04) (143/90 - 178/93)  BP(mean): --  RR: 18 (24 Mar 2019 05:15) (15 - 18)  SpO2: 94% (24 Mar 2019 05:15) (94% - 98%)    I&O's Summary    23 Mar 2019 07:01  -  24 Mar 2019 07:00  --------------------------------------------------------  IN: 1000 mL / OUT: 300 mL / NET: 700 mL        CAPILLARY BLOOD GLUCOSE      POCT Blood Glucose.: 113 mg/dL (24 Mar 2019 08:02)  POCT Blood Glucose.: 106 mg/dL (23 Mar 2019 21:41)  POCT Blood Glucose.: 102 mg/dL (23 Mar 2019 16:49)      PHYSICAL EXAM:    Constitutional: NAD, awake and alert, well-developed  HEENT: PERR, EOMI, Normal Hearing, MMM  Neck: Soft and supple, No LAD, No JVD  Respiratory: Breath sounds are clear bilaterally, No wheezing, rales or rhonchi  Cardiovascular: S1 and S2, regular rate and rhythm, no Murmurs, gallops or rubs  Gastrointestinal: Bowel Sounds present, soft, nontender, nondistended, no guarding, no rebound  Extremities: No peripheral edema  Vascular: 2+ peripheral pulses  Neurological: A/O x 3, no focal deficits  Musculoskeletal: 5/5 strength b/l upper and lower extremities  Skin: No rashes    MEDICATIONS:  MEDICATIONS  (STANDING):  amLODIPine   Tablet 5 milliGRAM(s) Oral daily  cefTRIAXone Injectable. 1000 milliGRAM(s) IV Push every 24 hours  dextrose 5%. 1000 milliLiter(s) (50 mL/Hr) IV Continuous <Continuous>  dextrose 50% Injectable 12.5 Gram(s) IV Push once  dextrose 50% Injectable 25 Gram(s) IV Push once  dextrose 50% Injectable 25 Gram(s) IV Push once  gabapentin 100 milliGRAM(s) Oral at bedtime  heparin  Injectable 5000 Unit(s) SubCutaneous every 12 hours  hydrochlorothiazide 25 milliGRAM(s) Oral daily  influenza   Vaccine 0.5 milliLiter(s) IntraMuscular once  insulin lispro (HumaLOG) corrective regimen sliding scale   SubCutaneous three times a day before meals  metoprolol succinate ER 25 milliGRAM(s) Oral daily      LABS: All Labs Reviewed:                        12.5   12.02 )-----------( 315      ( 24 Mar 2019 05:29 )             36.6     03-24    137  |  102  |  14  ----------------------------<  104<H>  3.0<L>   |  23  |  1.38<H>    Ca    8.1<L>      24 Mar 2019 05:29    TPro  7.8  /  Alb  3.9  /  TBili  0.6  /  DBili  x   /  AST  19  /  ALT  16  /  AlkPhos  67  03-23    PT/INR - ( 23 Mar 2019 06:45 )   PT: 11.2 sec;   INR: 1.01 ratio         PTT - ( 23 Mar 2019 06:45 )  PTT:30.9 sec  CARDIAC MARKERS ( 23 Mar 2019 11:33 )  0.026 ng/mL / x     / 85 U/L / x     / x      CARDIAC MARKERS ( 23 Mar 2019 06:45 )  <0.015 ng/mL / x     / x     / x     / x          Assessment:    68 yo male with Hx. of HTN, DM2, who presented in the ER for  dysuria, L flank pain , suprapubic pain for 3 days.  He was foung with  severe HTN in the ER     1-sepsis secondary to UTI w/ possible pyelo secondary to obstructie uropathy  -spiked temp on 101.9 on iv ceffriaxoine  -no urine culture sent in ER  -will change to zosyn; infectious disease consult  -stat blood cultures and urine cultures  -iv hydration    2-unconbtrolled htn  -increase Toprolol to 50mg  -c/w amloidipine and hydrochlorthizide    3-dvt prophy- sc heparin
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
## 2019-03-24 NOTE — CONSULT NOTE ADULT - SUBJECTIVE AND OBJECTIVE BOX
CHIEF COMPLAINT:Patient with possible stone in ureter    HISTORY OF PRESENT ILLNESS:Urine negative, no stranding    PAST MEDICAL & SURGICAL HISTORY:  Hypertension, unspecified type  No significant past surgical history      REVIEW OF SYSTEMS:    CONSTITUTIONAL: No weakness, fevers or chills  EYES/ENT: No visual changes;  No vertigo or throat pain   NECK: No pain or stiffness  RESPIRATORY: No cough, wheezing, hemoptysis; No shortness of breath  CARDIOVASCULAR: No chest pain or palpitations  GASTROINTESTINAL: No abdominal or epigastric pain. No nausea, vomiting, or hematemesis; No diarrhea or constipation. No melena or hematochezia.  GENITOURINARY: No dysuria, frequency or hematuria  NEUROLOGICAL: No numbness or weakness  SKIN: No itching, burning, rashes, or lesions   All other review of systems is negative unless indicated above.    MEDICATIONS  (STANDING):  amLODIPine   Tablet 5 milliGRAM(s) Oral daily  dextrose 5%. 1000 milliLiter(s) (50 mL/Hr) IV Continuous <Continuous>  dextrose 50% Injectable 12.5 Gram(s) IV Push once  dextrose 50% Injectable 25 Gram(s) IV Push once  dextrose 50% Injectable 25 Gram(s) IV Push once  gabapentin 100 milliGRAM(s) Oral at bedtime  heparin  Injectable 5000 Unit(s) SubCutaneous every 12 hours  hydrochlorothiazide 25 milliGRAM(s) Oral daily  influenza   Vaccine 0.5 milliLiter(s) IntraMuscular once  insulin lispro (HumaLOG) corrective regimen sliding scale   SubCutaneous three times a day before meals  metoprolol succinate ER 25 milliGRAM(s) Oral daily  piperacillin/tazobactam IVPB. 3.375 Gram(s) IV Intermittent once  piperacillin/tazobactam IVPB. 3.375 Gram(s) IV Intermittent every 8 hours  sodium chloride 0.9%. 1000 milliLiter(s) (85 mL/Hr) IV Continuous <Continuous>    MEDICATIONS  (PRN):  dextrose 40% Gel 15 Gram(s) Oral once PRN Blood Glucose LESS THAN 70 milliGRAM(s)/deciliter  glucagon  Injectable 1 milliGRAM(s) IntraMuscular once PRN Glucose LESS THAN 70 milligrams/deciliter      Allergies    No Known Allergies    Intolerances        SOCIAL HISTORY:    FAMILY HISTORY:  No pertinent family history in first degree relatives      Vital Signs Last 24 Hrs  T(C): 38.8 (24 Mar 2019 16:54), Max: 38.8 (24 Mar 2019 16:54)  T(F): 101.9 (24 Mar 2019 16:54), Max: 101.9 (24 Mar 2019 16:54)  HR: 115 (24 Mar 2019 16:54) (95 - 115)  BP: 145/102 (24 Mar 2019 16:54) (143/90 - 178/93)  BP(mean): --  RR: 18 (24 Mar 2019 10:09) (18 - 18)  SpO2: 95% (24 Mar 2019 16:54) (91% - 95%)    PHYSICAL EXAM:    Constitutional: NAD, well-developed  HEENT: BEV, EOMI, Normal Hearing, MMM  Neck: No LAD, No JVD  Back: Normal spine flexure, No CVA tenderness  Respiratory: CTAB   Cardiovascular: S1 and S2, RRR, no M/G/R  Abd: BS+, soft, NT/ND, No CVAT  : Normal phallus,open meatus,bilateral descended testes, no masses  CHERYL: Normal prostate, no masses  Extremities: No peripheral edema  Vascular: 2+ peripheral pulses  Neurological: A/O x 3, no focal deficits  Psychiatric: Normal mood, normal affect  Musculoskeletal: 5/5 strength b/l upper and lower extremities  Skin: No rashes    LABS:                        12.5   12.02 )-----------( 315      ( 24 Mar 2019 05:29 )             36.6     03-24    137  |  102  |  14  ----------------------------<  104<H>  3.0<L>   |  23  |  1.38<H>    Ca    8.1<L>      24 Mar 2019 05:29    TPro  7.8  /  Alb  3.9  /  TBili  0.6  /  DBili  x   /  AST  19  /  ALT  16  /  AlkPhos  67  03-23    PT/INR - ( 23 Mar 2019 06:45 )   PT: 11.2 sec;   INR: 1.01 ratio         PTT - ( 23 Mar 2019 06:45 )  PTT:30.9 sec  Urinalysis Basic - ( 23 Mar 2019 06:45 )    Color: Yellow / Appearance: Clear / S.015 / pH: x  Gluc: x / Ketone: Negative  / Bili: Negative / Urobili: Negative mg/dL   Blood: x / Protein: 30 mg/dL / Nitrite: Negative   Leuk Esterase: Negative / RBC: Negative /HPF / WBC 0-2   Sq Epi: x / Non Sq Epi: Occasional / Bacteria: Occasional      Urine Culture:     RADIOLOGY & ADDITIONAL STUDIES:

## 2019-03-24 NOTE — CONSULT NOTE ADULT - PROBLEM SELECTOR RECOMMENDATION 9
The stone may have passed, no evidence for infection.  I will return PRN or see patient in the office
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
## 2019-03-25 LAB
ALBUMIN SERPL ELPH-MCNC: 3 G/DL — LOW (ref 3.3–5)
ANION GAP SERPL CALC-SCNC: 10 MMOL/L — SIGNIFICANT CHANGE UP (ref 5–17)
ANION GAP SERPL CALC-SCNC: 11 MMOL/L — SIGNIFICANT CHANGE UP (ref 5–17)
APPEARANCE UR: CLEAR — SIGNIFICANT CHANGE UP
BACTERIA # UR AUTO: ABNORMAL
BILIRUB UR-MCNC: NEGATIVE — SIGNIFICANT CHANGE UP
BUN SERPL-MCNC: 24 MG/DL — HIGH (ref 7–23)
BUN SERPL-MCNC: 27 MG/DL — HIGH (ref 7–23)
CALCIUM SERPL-MCNC: 8.1 MG/DL — LOW (ref 8.5–10.1)
CALCIUM SERPL-MCNC: 8.2 MG/DL — LOW (ref 8.5–10.1)
CHLORIDE SERPL-SCNC: 104 MMOL/L — SIGNIFICANT CHANGE UP (ref 96–108)
CHLORIDE SERPL-SCNC: 106 MMOL/L — SIGNIFICANT CHANGE UP (ref 96–108)
CO2 SERPL-SCNC: 23 MMOL/L — SIGNIFICANT CHANGE UP (ref 22–31)
CO2 SERPL-SCNC: 23 MMOL/L — SIGNIFICANT CHANGE UP (ref 22–31)
COLOR SPEC: YELLOW — SIGNIFICANT CHANGE UP
CREAT ?TM UR-MCNC: 36 MG/DL — SIGNIFICANT CHANGE UP
CREAT SERPL-MCNC: 2.13 MG/DL — HIGH (ref 0.5–1.3)
CREAT SERPL-MCNC: 2.15 MG/DL — HIGH (ref 0.5–1.3)
DIFF PNL FLD: ABNORMAL
EPI CELLS # UR: NEGATIVE — SIGNIFICANT CHANGE UP
GLUCOSE BLDC GLUCOMTR-MCNC: 108 MG/DL — HIGH (ref 70–99)
GLUCOSE BLDC GLUCOMTR-MCNC: 114 MG/DL — HIGH (ref 70–99)
GLUCOSE BLDC GLUCOMTR-MCNC: 128 MG/DL — HIGH (ref 70–99)
GLUCOSE BLDC GLUCOMTR-MCNC: 165 MG/DL — HIGH (ref 70–99)
GLUCOSE SERPL-MCNC: 110 MG/DL — HIGH (ref 70–99)
GLUCOSE SERPL-MCNC: 113 MG/DL — HIGH (ref 70–99)
GLUCOSE UR QL: NEGATIVE MG/DL — SIGNIFICANT CHANGE UP
HCT VFR BLD CALC: 33.6 % — LOW (ref 39–50)
HGB BLD-MCNC: 11.7 G/DL — LOW (ref 13–17)
KETONES UR-MCNC: NEGATIVE — SIGNIFICANT CHANGE UP
LEUKOCYTE ESTERASE UR-ACNC: ABNORMAL
MCHC RBC-ENTMCNC: 30.5 PG — SIGNIFICANT CHANGE UP (ref 27–34)
MCHC RBC-ENTMCNC: 34.8 GM/DL — SIGNIFICANT CHANGE UP (ref 32–36)
MCV RBC AUTO: 87.5 FL — SIGNIFICANT CHANGE UP (ref 80–100)
NITRITE UR-MCNC: NEGATIVE — SIGNIFICANT CHANGE UP
NRBC # BLD: 0 /100 WBCS — SIGNIFICANT CHANGE UP (ref 0–0)
PH UR: 5 — SIGNIFICANT CHANGE UP (ref 5–8)
PHOSPHATE SERPL-MCNC: 3.8 MG/DL — SIGNIFICANT CHANGE UP (ref 2.5–4.5)
PLATELET # BLD AUTO: 284 K/UL — SIGNIFICANT CHANGE UP (ref 150–400)
POTASSIUM SERPL-MCNC: 3.7 MMOL/L — SIGNIFICANT CHANGE UP (ref 3.5–5.3)
POTASSIUM SERPL-MCNC: 3.8 MMOL/L — SIGNIFICANT CHANGE UP (ref 3.5–5.3)
POTASSIUM SERPL-SCNC: 3.7 MMOL/L — SIGNIFICANT CHANGE UP (ref 3.5–5.3)
POTASSIUM SERPL-SCNC: 3.8 MMOL/L — SIGNIFICANT CHANGE UP (ref 3.5–5.3)
PROT ?TM UR-MCNC: 10 MG/DL — SIGNIFICANT CHANGE UP (ref 0–12)
PROT UR-MCNC: NEGATIVE MG/DL — SIGNIFICANT CHANGE UP
PROT/CREAT UR-RTO: 0.3 RATIO — HIGH (ref 0–0.2)
RBC # BLD: 3.84 M/UL — LOW (ref 4.2–5.8)
RBC # FLD: 14.6 % — HIGH (ref 10.3–14.5)
RBC CASTS # UR COMP ASSIST: ABNORMAL /HPF (ref 0–4)
SODIUM SERPL-SCNC: 138 MMOL/L — SIGNIFICANT CHANGE UP (ref 135–145)
SODIUM SERPL-SCNC: 139 MMOL/L — SIGNIFICANT CHANGE UP (ref 135–145)
SODIUM UR-SCNC: 86 MMOL/L — SIGNIFICANT CHANGE UP
SP GR SPEC: 1.01 — SIGNIFICANT CHANGE UP (ref 1.01–1.02)
UROBILINOGEN FLD QL: NEGATIVE MG/DL — SIGNIFICANT CHANGE UP
WBC # BLD: 12.16 K/UL — HIGH (ref 3.8–10.5)
WBC # FLD AUTO: 12.16 K/UL — HIGH (ref 3.8–10.5)
WBC UR QL: SIGNIFICANT CHANGE UP

## 2019-03-25 PROCEDURE — 76770 US EXAM ABDO BACK WALL COMP: CPT | Mod: 26

## 2019-03-25 PROCEDURE — 93306 TTE W/DOPPLER COMPLETE: CPT | Mod: 26

## 2019-03-25 RX ORDER — TAMSULOSIN HYDROCHLORIDE 0.4 MG/1
0.4 CAPSULE ORAL AT BEDTIME
Qty: 0 | Refills: 0 | Status: DISCONTINUED | OUTPATIENT
Start: 2019-03-25 | End: 2019-04-02

## 2019-03-25 RX ORDER — CEFTRIAXONE 500 MG/1
1000 INJECTION, POWDER, FOR SOLUTION INTRAMUSCULAR; INTRAVENOUS EVERY 24 HOURS
Qty: 0 | Refills: 0 | Status: DISCONTINUED | OUTPATIENT
Start: 2019-03-25 | End: 2019-03-26

## 2019-03-25 RX ADMIN — Medication 1: at 18:30

## 2019-03-25 RX ADMIN — Medication 25 MILLIGRAM(S): at 05:07

## 2019-03-25 RX ADMIN — CEFTRIAXONE 1000 MILLIGRAM(S): 500 INJECTION, POWDER, FOR SOLUTION INTRAMUSCULAR; INTRAVENOUS at 12:20

## 2019-03-25 RX ADMIN — PIPERACILLIN AND TAZOBACTAM 25 GRAM(S): 4; .5 INJECTION, POWDER, LYOPHILIZED, FOR SOLUTION INTRAVENOUS at 05:07

## 2019-03-25 RX ADMIN — AMLODIPINE BESYLATE 5 MILLIGRAM(S): 2.5 TABLET ORAL at 05:07

## 2019-03-25 RX ADMIN — Medication 50 MILLIGRAM(S): at 05:07

## 2019-03-25 RX ADMIN — TAMSULOSIN HYDROCHLORIDE 0.4 MILLIGRAM(S): 0.4 CAPSULE ORAL at 21:34

## 2019-03-25 RX ADMIN — HEPARIN SODIUM 5000 UNIT(S): 5000 INJECTION INTRAVENOUS; SUBCUTANEOUS at 17:58

## 2019-03-25 RX ADMIN — GABAPENTIN 100 MILLIGRAM(S): 400 CAPSULE ORAL at 21:34

## 2019-03-25 RX ADMIN — HEPARIN SODIUM 5000 UNIT(S): 5000 INJECTION INTRAVENOUS; SUBCUTANEOUS at 05:07

## 2019-03-25 NOTE — PROGRESS NOTE ADULT - ASSESSMENT
70 yo male with Hx. of HTN, DM2, who presented in the ER for dysuria, L flank pain, suprapubic pain for 3 days.  He was found with  severe HTN in the ER and referred for telemetry admission. also found with wbc ct 11-12, UA unremarkable CT abd/pelvis with perivesicular fat stranding, cystitis, b/l hydroureteronephrosis, 3 mm bladder stone in R UVJ ? passed stone was given IV rocephin for UTI.     1. cystitis. bladder stone. urinary retention. edilma  - improving but Cr up monitor for retention  - urology eval noted  - on rocephin 1gmqdaily #3 s/p zosyn   - ? passed stone, slowly improving  - continue with abx coverage   - f/u urine cx/blood cx  - tailor abx based on cx results  - monitor temps  - tolerating abx well so far; no side effects noted  - reason for abx use and side effects reviewed with patient  - f/u cbc    2. other issues - HTN/DM - care per medicine

## 2019-03-25 NOTE — PROGRESS NOTE ADULT - SUBJECTIVE AND OBJECTIVE BOX
Date of service: 19 @ 14:26    pt seen and examined   feels better  fevers down  no dysuria or pain    ROS: no fever or chills; denies dizziness, no HA, no SOB or cough, no abdominal pain, no diarrhea or constipation; no legs pain, no rashes    MEDICATIONS  (STANDING):  amLODIPine   Tablet 5 milliGRAM(s) Oral daily  cefTRIAXone Injectable. 1000 milliGRAM(s) IV Push every 24 hours  dextrose 5%. 1000 milliLiter(s) (50 mL/Hr) IV Continuous <Continuous>  dextrose 50% Injectable 12.5 Gram(s) IV Push once  dextrose 50% Injectable 25 Gram(s) IV Push once  dextrose 50% Injectable 25 Gram(s) IV Push once  gabapentin 100 milliGRAM(s) Oral at bedtime  heparin  Injectable 5000 Unit(s) SubCutaneous every 12 hours  influenza   Vaccine 0.5 milliLiter(s) IntraMuscular once  insulin lispro (HumaLOG) corrective regimen sliding scale   SubCutaneous three times a day before meals  metoprolol succinate ER 50 milliGRAM(s) Oral daily  sodium chloride 0.9%. 1000 milliLiter(s) (85 mL/Hr) IV Continuous <Continuous>      Vital Signs Last 24 Hrs  T(C): 37.3 (25 Mar 2019 11:37), Max: 38.8 (24 Mar 2019 16:54)  T(F): 99.2 (25 Mar 2019 11:37), Max: 101.9 (24 Mar 2019 16:54)  HR: 98 (25 Mar 2019 11:37) (91 - 115)  BP: 156/87 (25 Mar 2019 11:37) (128/77 - 158/89)  BP(mean): --  RR: 16 (25 Mar 2019 11:37) (16 - 18)  SpO2: 94% (25 Mar 2019 11:37) (94% - 97%)      PE:  Constitutional: frail looking  HEENT: NC/AT, EOMI, PERRLA, conjunctivae clear; ears and nose atraumatic; pharynx benign  Neck: supple; thyroid not palpable  Back: no tenderness  Respiratory: respiratory effort normal; clear to auscultation  Cardiovascular: S1S2 regular, no murmurs  Abdomen: soft, not tender, not distended, positive BS; liver and spleen WNL  Genitourinary:  suprapubic tenderness  Lymphatic: no LN palpable  Musculoskeletal: no muscle tenderness, no joint swelling or tenderness  Extremities: no pedal edema  Neurological/ Psychiatric: moving all extremities  Skin: no rashes; no palpable lesions    Labs: all available labs reviewed                                   11.7   12.16 )-----------( 284      ( 25 Mar 2019 05:45 )             33.6     03-    138  |  104  |  24<H>  ----------------------------<  113<H>  3.8   |  23  |  2.15<H>    Ca    8.1<L>      25 Mar 2019 05:45          Urinalysis Basic - ( 23 Mar 2019 06:45 )    Color: Yellow / Appearance: Clear / S.015 / pH: x  Gluc: x / Ketone: Negative  / Bili: Negative / Urobili: Negative mg/dL   Blood: x / Protein: 30 mg/dL / Nitrite: Negative   Leuk Esterase: Negative / RBC: Negative /HPF / WBC 0-2   Sq Epi: x / Non Sq Epi: Occasional / Bacteria: Occasional          Radiology: all available radiological tests reviewed    EXAM:  CT BRAIN                            PROCEDURE DATE:  2019          INTERPRETATION:      CT head without IV contrast        CLINICAL INFORMATION:        Headache hypertension    TECHNIQUE: Contiguous axial 5 mm sections were obtained through the head.   Sagittal and coronal 2-D reformatted images were also obtained.   This   scan was performed using automatic exposure control (radiation dose   reduction software) to obtain a diagnostic image quality scan with   patient dose as low as reasonably achievable.     FINDINGS:   No previous examinations are available for review.    The brain demonstrates encephalomalacia and gliosis in the LEFT frontal   and temporal lobes, likely sequela of prior trauma. Mild anterior   periventricular and bifrontal some cortical white matter ischemia is   noted.   No acute cerebral cortical infarct is seen.  No intracranial   hemorrhage is found.  No mass effect is found in the brain.      The ventricles, sulci and basal cisterns appear unremarkable.         The orbits are unremarkable.  The paranasal sinuses are significant for   small retention cyst in the RIGHT sphenoid sinus.  The nasal cavity   appears intact.  The nasopharynx is symmetric.  The central skull base,   petrous temporal bones and calvarium remain intact.      IMPRESSION:   Mild encephalomalacia and gliosis in the LEFT frontal and   temporal lobes, likely sequela of prior trauma. Mild anterior   periventricular and bifrontal some cortical white matter ischemia is   noted.   Small retention cyst in RIGHT sphenoid sinus.      < from: CT Abdomen and Pelvis No Cont (19 @ 07:30) >  EXAM:  CT ABDOMEN AND PELVIS                            PROCEDURE DATE:  2019          INTERPRETATION:  HISTORY: Pain and difficulty urinating. Evaluate for   stone.    TECHNIQUE: CT scan of the abdomen and pelvis was performed from the domes  of the diaphragm to the symphysis pubis without oral or intravenous   contrast.  Coronal and sagittal reformatted images are provided.    COMPARISON: There is no prior study for comparison.    FINDINGS:   The lung bases are clear. The heart is not enlarged.    Evaluation of the parenchymal organs and vascular structures is limited   without intravenous contrast.     There is mild right hydroureteronephrosis secondary to a 3 mm stone near   the UVJ within the bladder. There is mild left hydroureteronephrosis   without obstructing ureteral calculus. There is no significant   perinephric stranding. The unenhanced appearance of the liver,   gallbladder, spleen, pancreas and adrenal glands is unremarkable. Small   left renal cyst is present.    Is no bowel obstruction, free air or ascites. The appendix is normal.   There is descending and sigmoid colon diverticulosis without evidence of   diverticulitis.    The abdominal aorta is normal in caliber. There is no retroperitoneal,   pelvic or inguinal adenopathy.     Mild the bladder is not thick-walled there is perivesicular fat stranding   suggesting inflammation. The prostate gland is enlarged.    There are no acute osseous abnormalities. There our degenerative changes   in the lumbar spine.    IMPRESSION:   Mild bilateral hydroureteronephrosis without significant perinephric   stranding. 3 mm bladder stone near the right UVJ,? Recently passed versus   chronic. Perivesicular fat stranding concerning for cystitis. Correlation   with urinalysis is recommended.          Advanced directives addressed: full resuscitation

## 2019-03-25 NOTE — PROGRESS NOTE ADULT - SUBJECTIVE AND OBJECTIVE BOX
CHIEF COMPLAINT/Diagnosis: sepsis secondary to UTI/ urinary retention/ BPH    SUBJECTIVE: no complaints    REVIEW OF SYSTEMS:    CONSTITUTIONAL: No weakness, fevers or chills  EYES/ENT: No visual changes;  No vertigo or throat pain   NECK: No pain or stiffness  RESPIRATORY: No cough, wheezing, hemoptysis; No shortness of breath  CARDIOVASCULAR: No chest pain or palpitations  GASTROINTESTINAL: No abdominal or epigastric pain. No nausea, vomiting, or hematemesis; No diarrhea or constipation. No melena or hematochezia.  GENITOURINARY: No dysuria, frequency or hematuria  NEUROLOGICAL: No numbness or weakness  SKIN: No itching, burning, rashes, or lesions   All other review of systems is negative unless indicated above    Vital Signs Last 24 Hrs  T(C): 37.3 (25 Mar 2019 11:37), Max: 38.1 (24 Mar 2019 19:33)  T(F): 99.2 (25 Mar 2019 11:37), Max: 100.6 (24 Mar 2019 19:33)  HR: 98 (25 Mar 2019 11:37) (91 - 103)  BP: 156/87 (25 Mar 2019 11:37) (128/77 - 158/89)  BP(mean): --  RR: 16 (25 Mar 2019 11:37) (16 - 18)  SpO2: 94% (25 Mar 2019 11:37) (94% - 97%)    I&O's Summary    25 Mar 2019 07:01  -  25 Mar 2019 16:56  --------------------------------------------------------  IN: 240 mL / OUT: 1 mL / NET: 239 mL        CAPILLARY BLOOD GLUCOSE      POCT Blood Glucose.: 108 mg/dL (25 Mar 2019 12:18)  POCT Blood Glucose.: 114 mg/dL (25 Mar 2019 08:10)  POCT Blood Glucose.: 169 mg/dL (24 Mar 2019 21:35)  POCT Blood Glucose.: 116 mg/dL (24 Mar 2019 17:32)      PHYSICAL EXAM:    Constitutional: NAD, awake and alert, well-developed  HEENT: PERR, EOMI, Normal Hearing, MMM  Neck: Soft and supple, No LAD, No JVD  Respiratory: Breath sounds are clear bilaterally, No wheezing, rales or rhonchi  Cardiovascular: S1 and S2, regular rate and rhythm, no Murmurs, gallops or rubs  Gastrointestinal: Bowel Sounds present, soft, nontender, nondistended, no guarding, no rebound  Extremities: No peripheral edema  Vascular: 2+ peripheral pulses  Neurological: A/O x 3, no focal deficits  Musculoskeletal: 5/5 strength b/l upper and lower extremities  Skin: No rashes    MEDICATIONS:  MEDICATIONS  (STANDING):  amLODIPine   Tablet 5 milliGRAM(s) Oral daily  cefTRIAXone Injectable. 1000 milliGRAM(s) IV Push every 24 hours  dextrose 5%. 1000 milliLiter(s) (50 mL/Hr) IV Continuous <Continuous>  dextrose 50% Injectable 12.5 Gram(s) IV Push once  dextrose 50% Injectable 25 Gram(s) IV Push once  dextrose 50% Injectable 25 Gram(s) IV Push once  gabapentin 100 milliGRAM(s) Oral at bedtime  heparin  Injectable 5000 Unit(s) SubCutaneous every 12 hours  influenza   Vaccine 0.5 milliLiter(s) IntraMuscular once  insulin lispro (HumaLOG) corrective regimen sliding scale   SubCutaneous three times a day before meals  metoprolol succinate ER 50 milliGRAM(s) Oral daily  sodium chloride 0.9%. 1000 milliLiter(s) (85 mL/Hr) IV Continuous <Continuous>  tamsulosin 0.4 milliGRAM(s) Oral at bedtime      LABS: All Labs Reviewed:                        11.7   12.16 )-----------( 284      ( 25 Mar 2019 05:45 )             33.6     03-25    139  |  106  |  27<H>  ----------------------------<  110<H>  3.7   |  23  |  2.13<H>    Ca    8.2<L>      25 Mar 2019 15:57  Phos  3.8     03-25    TPro  x   /  Alb  3.0<L>  /  TBili  x   /  DBili  x   /  AST  x   /  ALT  x   /  AlkPhos  x   03-25            Assessment:    70 yo male with Hx. of HTN, DM2, who presented in the ER for  dysuria, L flank pain , suprapubic pain for 3 days.  He was foung with  severe HTN in the ER     1-sepsis secondary to UTI w/ possible pyelo secondary to obstructie uropathy  -spiked temp on 101.9 3/24  -no urine culture sent in ER  - infectious disease consult  -stat blood cultures and urine cultures  -iv hydration  -c/w iv ceftriaxone    2-unconbtrolled htn  -increase Toprolol to 50mg  -c/w amloidipine and hydrochlorthizide    3-dvt prophy- sc heparin    4-acute renal failure secondary to urinary retention  -hussein BPH  -start flomax 0.4mg  -c/w peterson catheter

## 2019-03-25 NOTE — PROGRESS NOTE ADULT - SUBJECTIVE AND OBJECTIVE BOX
The patient is a 70 yo male with Hx. of HTN, DM2, who presented in the ER for  dysuria, L flank pain , suprapubic pain for 3 days.  He was foung with  severe HTN   and possible urinary retention   Since admission has been urinating well.  Ct abd + mild Bilat hydro noted  no peterson was placed  no IV contrast     Today   Cr has been rising  renal eval called for JULIANN     pt feels well   reports no diff urinating or discomfort    PAST MEDICAL & SURGICAL HISTORY:  Hypertension, unspecified type  No significant past surgical history    Home Medications:      MEDICATIONS  (STANDING):  amLODIPine   Tablet 5 milliGRAM(s) Oral daily  cefTRIAXone Injectable. 1000 milliGRAM(s) IV Push every 24 hours  dextrose 5%. 1000 milliLiter(s) (50 mL/Hr) IV Continuous <Continuous>  dextrose 50% Injectable 12.5 Gram(s) IV Push once  dextrose 50% Injectable 25 Gram(s) IV Push once  dextrose 50% Injectable 25 Gram(s) IV Push once  gabapentin 100 milliGRAM(s) Oral at bedtime  heparin  Injectable 5000 Unit(s) SubCutaneous every 12 hours  influenza   Vaccine 0.5 milliLiter(s) IntraMuscular once  insulin lispro (HumaLOG) corrective regimen sliding scale   SubCutaneous three times a day before meals  metoprolol succinate ER 50 milliGRAM(s) Oral daily  sodium chloride 0.9%. 1000 milliLiter(s) (85 mL/Hr) IV Continuous <Continuous>      Allergies    No Known Allergies    Intolerances        SOCIAL HISTORY:  Denies ETOh,Smoking,     FAMILY HISTORY:  No pertinent family history in first degree relatives      REVIEW OF SYSTEMS:    CONSTITUTIONAL: No weakness, fevers or chills  EYES/ENT: No visual changes;  No vertigo or throat pain   NECK: No pain or stiffness  RESPIRATORY: No cough, wheezing, hemoptysis; No shortness of breath  CARDIOVASCULAR: No chest pain or palpitations  GASTROINTESTINAL: No abdominal or epigastric pain. No nausea, vomiting, or hematemesis; No diarrhea or constipation. No melena or hematochezia.  GENITOURINARY: No dysuria, frequency or hematuria  NEUROLOGICAL: No numbness or weakness  SKIN: No itching, burning, rashes, or lesions   All other review of systems is negative unless indicated above.    Vital Signs Last 24 Hrs  T(C): 37.3 (25 Mar 2019 11:37), Max: 38.8 (24 Mar 2019 16:54)  T(F): 99.2 (25 Mar 2019 11:37), Max: 101.9 (24 Mar 2019 16:54)  HR: 98 (25 Mar 2019 11:37) (91 - 115)  BP: 156/87 (25 Mar 2019 11:37) (128/77 - 158/89)  BP(mean): --  RR: 16 (25 Mar 2019 11:37) (16 - 18)  SpO2: 94% (25 Mar 2019 11:37) (94% - 97%)    Wt(kg): --    I and O's:     @ 07:01  -   @ 15:51  --------------------------------------------------------  IN: 240 mL / OUT: 1 mL / NET: 239 mL          PHYSICAL EXAM:    Constitutional: NAD  HEENT: PERRLA, EOMI,  MMM  Neck: No LAD, No JVD  Respiratory: CTAB  Cardiovascular: S1 and S2  Gastrointestinal: BS+, soft, NT/ND  Extremities: No peripheral edema  Neurological: A/O x 3, no focal deficits  Psychiatric: Normal mood, normal affect  : No Peterson  Skin: No rashes  Access: Not applicable    LABS:               138    |  104    |  24     ----------------------------<  113       25 Mar 2019 05:45  3.8     |  23     |  2.15     137    |  102    |  14     ----------------------------<  104       24 Mar 2019 05:29  3.0     |  23     |  1.38     137    |  104    |  13     ----------------------------<  119       23 Mar 2019 06:45  3.8     |  25     |  1.03     Ca    8.1        25 Mar 2019 05:45  Ca    8.1        24 Mar 2019 05:29        TPro  7.8    /  Alb  3.9    /  TBili  0.6    /        23 Mar 2019 06:45  DBili  x      /  AST  19     /  ALT  16     /  AlkPhos  67       45        Urine Studies:  Urinalysis Basic - ( 25 Mar 2019 14:10 )    Color: Yellow / Appearance: Clear / S.010 / pH: x  Gluc: x / Ketone: Negative  / Bili: Negative / Urobili: Negative mg/dL   Blood: x / Protein: Negative mg/dL / Nitrite: Negative   Leuk Esterase: Trace / RBC: 3-5 /HPF / WBC 0-2   Sq Epi: x / Non Sq Epi: Negative / Bacteria: Occasional    Urine Microscopic-Add On (NC) (19 @ 14:10)    Red Blood Cell - Urine: 3-5 /HPF    White Blood Cell - Urine: 0-2    Bacteria: Occasional    Epithelial Cells: Negative    Protein/Creatinine Ratio Calculation: 0.3 Ratio ( @ 14:10)  Creatinine, Random Urine: 36.0 mg/dL ( @ 14:10)        RADIOLOGY & ADDITIONAL STUDIES:    EXAM:  US KIDNEYS AND BLADDER                            PROCEDURE DATE:  2019          INTERPRETATION:  US KIDNEYS AND BLADDER       HISTORY:  Renal Failure, acute kidney injury    COMPARISON: CT abdomen and pelvis 3/23/2019    PROCEDURE/TECHNIQUE: Multiple transverse and longitudinal sonographic   images of the bilateral kidneys, retroperitoneum and urinary bladder were   obtained.       KIDNEYS:  The RIGHT kidney measures 11.7 cm in length.  Mild hydronephrosis, no shadowing stone.  Preserved renal cortical thickness and echogenicity.      The LEFT kidney measures 11.6 cm in length.  Mild hydronephrosis, no shadowing stone.  Preserved renal cortical thickness and echogenicity.      URINARY BLADDER:        Stable small 9 mm calculusadjacent to the right ureterovesical junction.  Both ureteral jets were visualized using color Doppler.     Incomplete bladder emptying.    The aorta and inferior vena cava are normal in caliber.        IMPRESSION:    Stable mild bilateral hydronephrosis.    Stable 9 mm bladder calculus.    Incomplete bladder emptying.

## 2019-03-25 NOTE — PROGRESS NOTE ADULT - ASSESSMENT
69 y o male with hx of HTN, presenting with left flank pains and suprapubic pains with possible retention and urinary   symptomts of UTI, noted to have bladder calcluli and bilat hydro     JULIANN - Cr rising sec to  obstructive uropathy   bilat hydro on Renal USS  Bladder scan tonight 800 ml    straight cath now and check pvr   start flomax     urology reevaluation    urine lytes   urine otherwise bland   DC HCTZ due to JULIANN      HTN - stable    - goal 's  to avoid overcorrection    - avoid ACE/ARB for now      d/w 2N RN    Thank you for the courtesy of this consult. We will follow this patient with you.   Management is subject to change if new information becomes available or patient condition changes.

## 2019-03-26 LAB
ALBUMIN SERPL ELPH-MCNC: 3.1 G/DL — LOW (ref 3.3–5)
ANION GAP SERPL CALC-SCNC: 8 MMOL/L — SIGNIFICANT CHANGE UP (ref 5–17)
BUN SERPL-MCNC: 21 MG/DL — SIGNIFICANT CHANGE UP (ref 7–23)
CALCIUM SERPL-MCNC: 8.9 MG/DL — SIGNIFICANT CHANGE UP (ref 8.5–10.1)
CHLORIDE SERPL-SCNC: 106 MMOL/L — SIGNIFICANT CHANGE UP (ref 96–108)
CO2 SERPL-SCNC: 27 MMOL/L — SIGNIFICANT CHANGE UP (ref 22–31)
CREAT SERPL-MCNC: 1.36 MG/DL — HIGH (ref 0.5–1.3)
CULTURE RESULTS: NO GROWTH — SIGNIFICANT CHANGE UP
EOSINOPHIL NFR URNS MANUAL: NEGATIVE — SIGNIFICANT CHANGE UP
GLUCOSE BLDC GLUCOMTR-MCNC: 133 MG/DL — HIGH (ref 70–99)
GLUCOSE BLDC GLUCOMTR-MCNC: 140 MG/DL — HIGH (ref 70–99)
GLUCOSE BLDC GLUCOMTR-MCNC: 145 MG/DL — HIGH (ref 70–99)
GLUCOSE BLDC GLUCOMTR-MCNC: 151 MG/DL — HIGH (ref 70–99)
GLUCOSE SERPL-MCNC: 128 MG/DL — HIGH (ref 70–99)
HCT VFR BLD CALC: 34.3 % — LOW (ref 39–50)
HGB BLD-MCNC: 11.8 G/DL — LOW (ref 13–17)
MCHC RBC-ENTMCNC: 30.2 PG — SIGNIFICANT CHANGE UP (ref 27–34)
MCHC RBC-ENTMCNC: 34.4 GM/DL — SIGNIFICANT CHANGE UP (ref 32–36)
MCV RBC AUTO: 87.7 FL — SIGNIFICANT CHANGE UP (ref 80–100)
NRBC # BLD: 0 /100 WBCS — SIGNIFICANT CHANGE UP (ref 0–0)
PHOSPHATE SERPL-MCNC: 4.1 MG/DL — SIGNIFICANT CHANGE UP (ref 2.5–4.5)
PLATELET # BLD AUTO: 291 K/UL — SIGNIFICANT CHANGE UP (ref 150–400)
POTASSIUM SERPL-MCNC: 3.8 MMOL/L — SIGNIFICANT CHANGE UP (ref 3.5–5.3)
POTASSIUM SERPL-SCNC: 3.8 MMOL/L — SIGNIFICANT CHANGE UP (ref 3.5–5.3)
RBC # BLD: 3.91 M/UL — LOW (ref 4.2–5.8)
RBC # FLD: 14.6 % — HIGH (ref 10.3–14.5)
SODIUM SERPL-SCNC: 141 MMOL/L — SIGNIFICANT CHANGE UP (ref 135–145)
SPECIMEN SOURCE: SIGNIFICANT CHANGE UP
WBC # BLD: 12.95 K/UL — HIGH (ref 3.8–10.5)
WBC # FLD AUTO: 12.95 K/UL — HIGH (ref 3.8–10.5)

## 2019-03-26 PROCEDURE — 71045 X-RAY EXAM CHEST 1 VIEW: CPT | Mod: 26

## 2019-03-26 RX ORDER — PIPERACILLIN AND TAZOBACTAM 4; .5 G/20ML; G/20ML
3.38 INJECTION, POWDER, LYOPHILIZED, FOR SOLUTION INTRAVENOUS ONCE
Qty: 0 | Refills: 0 | Status: COMPLETED | OUTPATIENT
Start: 2019-03-26 | End: 2019-03-26

## 2019-03-26 RX ORDER — PIPERACILLIN AND TAZOBACTAM 4; .5 G/20ML; G/20ML
3.38 INJECTION, POWDER, LYOPHILIZED, FOR SOLUTION INTRAVENOUS EVERY 8 HOURS
Qty: 0 | Refills: 0 | Status: DISCONTINUED | OUTPATIENT
Start: 2019-03-26 | End: 2019-03-27

## 2019-03-26 RX ADMIN — Medication 650 MILLIGRAM(S): at 20:53

## 2019-03-26 RX ADMIN — Medication 50 MILLIGRAM(S): at 05:45

## 2019-03-26 RX ADMIN — HEPARIN SODIUM 5000 UNIT(S): 5000 INJECTION INTRAVENOUS; SUBCUTANEOUS at 05:45

## 2019-03-26 RX ADMIN — Medication 650 MILLIGRAM(S): at 14:17

## 2019-03-26 RX ADMIN — Medication 650 MILLIGRAM(S): at 21:23

## 2019-03-26 RX ADMIN — AMLODIPINE BESYLATE 5 MILLIGRAM(S): 2.5 TABLET ORAL at 05:45

## 2019-03-26 RX ADMIN — HEPARIN SODIUM 5000 UNIT(S): 5000 INJECTION INTRAVENOUS; SUBCUTANEOUS at 17:37

## 2019-03-26 RX ADMIN — GABAPENTIN 100 MILLIGRAM(S): 400 CAPSULE ORAL at 20:53

## 2019-03-26 RX ADMIN — Medication 650 MILLIGRAM(S): at 11:17

## 2019-03-26 RX ADMIN — PIPERACILLIN AND TAZOBACTAM 200 GRAM(S): 4; .5 INJECTION, POWDER, LYOPHILIZED, FOR SOLUTION INTRAVENOUS at 15:42

## 2019-03-26 RX ADMIN — Medication 1: at 12:40

## 2019-03-26 RX ADMIN — TAMSULOSIN HYDROCHLORIDE 0.4 MILLIGRAM(S): 0.4 CAPSULE ORAL at 20:54

## 2019-03-26 RX ADMIN — CEFTRIAXONE 1000 MILLIGRAM(S): 500 INJECTION, POWDER, FOR SOLUTION INTRAMUSCULAR; INTRAVENOUS at 11:17

## 2019-03-26 RX ADMIN — PIPERACILLIN AND TAZOBACTAM 25 GRAM(S): 4; .5 INJECTION, POWDER, LYOPHILIZED, FOR SOLUTION INTRAVENOUS at 20:53

## 2019-03-26 NOTE — PHYSICAL THERAPY INITIAL EVALUATION ADULT - PERTINENT HX OF CURRENT PROBLEM, REHAB EVAL
70 yo male with Hx. of HTN, DM2, who presented in the ER for  dysuria, L flank pain , suprapubic pain for 3 days.  He was foung with  severe HTN in the ER

## 2019-03-26 NOTE — PHYSICAL THERAPY INITIAL EVALUATION ADULT - GENERAL OBSERVATIONS, REHAB EVAL
Pt rec'd supine in bed, Kazakh speaking, mildly confused, no complaints, however pt's left wrist is TTP with visible bony deformity. Pt cooperative wit PT, CNA present at bedside to assist with translation.

## 2019-03-26 NOTE — CHART NOTE - NSCHARTNOTEFT_GEN_A_CORE
Pt with temp 102.8 F, WS=580    Pt with UTI, and hydronephrosis     Plan  -BC x2 Pt with temp 102.8 F, CR=443.        A/P-sepsis secondary to UTI w/ possible pyelo secondary to obstructive uropathy    -BC x2  -Monitor VS closely  -Tylenol PRN   -Continue Zosyn  -ID following   -Nephrology also following

## 2019-03-26 NOTE — PROGRESS NOTE ADULT - SUBJECTIVE AND OBJECTIVE BOX
CHIEF COMPLAINT/diagnosis: uti/ urinary retention/ urinary stone    SUBJECTIVE: no complaints    REVIEW OF SYSTEMS:    CONSTITUTIONAL: No weakness, fevers or chills  EYES/ENT: No visual changes;  No vertigo or throat pain   NECK: No pain or stiffness  RESPIRATORY: No cough, wheezing, hemoptysis; No shortness of breath  CARDIOVASCULAR: No chest pain or palpitations  GASTROINTESTINAL: No abdominal or epigastric pain. No nausea, vomiting, or hematemesis; No diarrhea or constipation. No melena or hematochezia.  GENITOURINARY: No dysuria, frequency or hematuria  NEUROLOGICAL: No numbness or weakness  SKIN: No itching, burning, rashes, or lesions   All other review of systems is negative unless indicated above    Vital Signs Last 24 Hrs  T(C): 38.9 (26 Mar 2019 14:14), Max: 38.9 (26 Mar 2019 14:14)  T(F): 102.1 (26 Mar 2019 14:14), Max: 102.1 (26 Mar 2019 14:14)  HR: 113 (26 Mar 2019 14:14) (99 - 120)  BP: 153/96 (26 Mar 2019 11:02) (153/96 - 162/97)  BP(mean): --  RR: 20 (26 Mar 2019 14:14) (16 - 20)  SpO2: 95% (26 Mar 2019 14:14) (95% - 97%)    I&O's Summary    25 Mar 2019 07:01  -  26 Mar 2019 07:00  --------------------------------------------------------  IN: 720 mL / OUT: 4851 mL / NET: -4131 mL    26 Mar 2019 07:01  -  26 Mar 2019 14:45  --------------------------------------------------------  IN: 240 mL / OUT: 1550 mL / NET: -1310 mL        CAPILLARY BLOOD GLUCOSE      POCT Blood Glucose.: 151 mg/dL (26 Mar 2019 12:36)  POCT Blood Glucose.: 145 mg/dL (26 Mar 2019 07:27)  POCT Blood Glucose.: 128 mg/dL (25 Mar 2019 21:58)  POCT Blood Glucose.: 165 mg/dL (25 Mar 2019 17:54)      PHYSICAL EXAM:    Constitutional: NAD, awake and alert, well-developed  HEENT: PERR, EOMI, Normal Hearing, MMM  Neck: Soft and supple, No LAD, No JVD  Respiratory: Breath sounds are clear bilaterally, No wheezing, rales or rhonchi  Cardiovascular: S1 and S2, regular rate and rhythm, no Murmurs, gallops or rubs  Gastrointestinal: Bowel Sounds present, soft, nontender, nondistended, no guarding, no rebound  Extremities: No peripheral edema  Vascular: 2+ peripheral pulses  Neurological: A/O x 3, no focal deficits  Musculoskeletal: 5/5 strength b/l upper and lower extremities  Skin: No rashes    MEDICATIONS:  MEDICATIONS  (STANDING):  amLODIPine   Tablet 5 milliGRAM(s) Oral daily  cefTRIAXone Injectable. 1000 milliGRAM(s) IV Push every 24 hours  dextrose 5%. 1000 milliLiter(s) (50 mL/Hr) IV Continuous <Continuous>  dextrose 50% Injectable 12.5 Gram(s) IV Push once  dextrose 50% Injectable 25 Gram(s) IV Push once  dextrose 50% Injectable 25 Gram(s) IV Push once  gabapentin 100 milliGRAM(s) Oral at bedtime  heparin  Injectable 5000 Unit(s) SubCutaneous every 12 hours  influenza   Vaccine 0.5 milliLiter(s) IntraMuscular once  insulin lispro (HumaLOG) corrective regimen sliding scale   SubCutaneous three times a day before meals  metoprolol succinate ER 50 milliGRAM(s) Oral daily  sodium chloride 0.9%. 1000 milliLiter(s) (85 mL/Hr) IV Continuous <Continuous>  tamsulosin 0.4 milliGRAM(s) Oral at bedtime      LABS: All Labs Reviewed:                        11.8   12.95 )-----------( 291      ( 26 Mar 2019 05:33 )             34.3     03-26    141  |  106  |  21  ----------------------------<  128<H>  3.8   |  27  |  1.36<H>    Ca    8.9      26 Mar 2019 05:33  Phos  4.1     03-26    TPro  x   /  Alb  3.1<L>  /  TBili  x   /  DBili  x   /  AST  x   /  ALT  x   /  AlkPhos  x   03-26          Blood Culture: 03-24 @ 18:49  Organism --  Gram Stain Blood -- Gram Stain --  Specimen Source .Blood None  Culture-Blood --    03-24 @ 01:06  Organism --  Gram Stain Blood -- Gram Stain --  Specimen Source .Urine Clean Catch (Midstream)  Culture-Blood --      Assessment:    68 yo male with Hx. of HTN, DM2, who presented in the ER for  dysuria, L flank pain , suprapubic pain for 3 days.  He was foung with  severe HTN in the ER     1-sepsis secondary to UTI w/ possible pyelo secondary to obstructie uropathy  -spiked temp on 101.9 3/24  -no urine culture sent in ER  - infectious disease consult  -stat blood cultures and urine cultures  -iv hydration  -change antibiotics from ceftiraxone  to Zosyn > patient spiking temps on IV cefriaxone; Spiked 102.9    2-unconbtrolled htn  -increase Toprolol to 50mg  -c/w amloidipine and hydrochlorthizide    3-dvt prophy- sc heparin    4-acute renal failure secondary to urinary retention  -hussein BPH  -c/w flomax 0.4mg  -c/w peterson catheter  -Cr now improving  back to baseline with peterson catheter  -explained to patient that he will likely need to be discharged with peterson catheter and outpatient removal of catheter

## 2019-03-27 LAB
ALBUMIN SERPL ELPH-MCNC: 3 G/DL — LOW (ref 3.3–5)
ANION GAP SERPL CALC-SCNC: 7 MMOL/L — SIGNIFICANT CHANGE UP (ref 5–17)
BUN SERPL-MCNC: 24 MG/DL — HIGH (ref 7–23)
CALCIUM SERPL-MCNC: 8.6 MG/DL — SIGNIFICANT CHANGE UP (ref 8.5–10.1)
CHLORIDE SERPL-SCNC: 93 MMOL/L — LOW (ref 96–108)
CO2 SERPL-SCNC: 28 MMOL/L — SIGNIFICANT CHANGE UP (ref 22–31)
CREAT SERPL-MCNC: 1.47 MG/DL — HIGH (ref 0.5–1.3)
GLUCOSE BLDC GLUCOMTR-MCNC: 122 MG/DL — HIGH (ref 70–99)
GLUCOSE BLDC GLUCOMTR-MCNC: 133 MG/DL — HIGH (ref 70–99)
GLUCOSE BLDC GLUCOMTR-MCNC: 142 MG/DL — HIGH (ref 70–99)
GLUCOSE BLDC GLUCOMTR-MCNC: 211 MG/DL — HIGH (ref 70–99)
GLUCOSE SERPL-MCNC: 205 MG/DL — HIGH (ref 70–99)
PHOSPHATE SERPL-MCNC: 3.3 MG/DL — SIGNIFICANT CHANGE UP (ref 2.5–4.5)
POTASSIUM SERPL-MCNC: 3.4 MMOL/L — LOW (ref 3.5–5.3)
POTASSIUM SERPL-SCNC: 3.4 MMOL/L — LOW (ref 3.5–5.3)
SODIUM SERPL-SCNC: 128 MMOL/L — LOW (ref 135–145)

## 2019-03-27 RX ORDER — PIPERACILLIN AND TAZOBACTAM 4; .5 G/20ML; G/20ML
3.38 INJECTION, POWDER, LYOPHILIZED, FOR SOLUTION INTRAVENOUS EVERY 8 HOURS
Qty: 0 | Refills: 0 | Status: DISCONTINUED | OUTPATIENT
Start: 2019-03-27 | End: 2019-04-02

## 2019-03-27 RX ORDER — POTASSIUM CHLORIDE 20 MEQ
40 PACKET (EA) ORAL ONCE
Qty: 0 | Refills: 0 | Status: COMPLETED | OUTPATIENT
Start: 2019-03-27 | End: 2019-03-27

## 2019-03-27 RX ADMIN — HEPARIN SODIUM 5000 UNIT(S): 5000 INJECTION INTRAVENOUS; SUBCUTANEOUS at 05:17

## 2019-03-27 RX ADMIN — HEPARIN SODIUM 5000 UNIT(S): 5000 INJECTION INTRAVENOUS; SUBCUTANEOUS at 17:45

## 2019-03-27 RX ADMIN — Medication 650 MILLIGRAM(S): at 15:12

## 2019-03-27 RX ADMIN — Medication 2: at 12:37

## 2019-03-27 RX ADMIN — PIPERACILLIN AND TAZOBACTAM 25 GRAM(S): 4; .5 INJECTION, POWDER, LYOPHILIZED, FOR SOLUTION INTRAVENOUS at 14:26

## 2019-03-27 RX ADMIN — Medication 650 MILLIGRAM(S): at 21:08

## 2019-03-27 RX ADMIN — Medication 50 MILLIGRAM(S): at 05:17

## 2019-03-27 RX ADMIN — Medication 650 MILLIGRAM(S): at 21:38

## 2019-03-27 RX ADMIN — Medication 650 MILLIGRAM(S): at 15:42

## 2019-03-27 RX ADMIN — GABAPENTIN 100 MILLIGRAM(S): 400 CAPSULE ORAL at 21:08

## 2019-03-27 RX ADMIN — Medication 40 MILLIEQUIVALENT(S): at 17:45

## 2019-03-27 RX ADMIN — PIPERACILLIN AND TAZOBACTAM 25 GRAM(S): 4; .5 INJECTION, POWDER, LYOPHILIZED, FOR SOLUTION INTRAVENOUS at 21:08

## 2019-03-27 RX ADMIN — Medication 650 MILLIGRAM(S): at 05:18

## 2019-03-27 RX ADMIN — PIPERACILLIN AND TAZOBACTAM 25 GRAM(S): 4; .5 INJECTION, POWDER, LYOPHILIZED, FOR SOLUTION INTRAVENOUS at 05:16

## 2019-03-27 RX ADMIN — AMLODIPINE BESYLATE 5 MILLIGRAM(S): 2.5 TABLET ORAL at 05:17

## 2019-03-27 RX ADMIN — TAMSULOSIN HYDROCHLORIDE 0.4 MILLIGRAM(S): 0.4 CAPSULE ORAL at 21:08

## 2019-03-27 NOTE — PROGRESS NOTE ADULT - ASSESSMENT
69 y o male with hx of HTN, presenting with left flank pains and suprapubic pains with possible retention and urinary   symptomts of UTI, noted to have bladder calcluli and bilat hydro     JULINAN - Cr rising sec to  obstructive uropathy   bilat hydro on Renal USS w retention - now has peterson    Urology followup    flomax      HTN - stable    - goal 's  to avoid overcorrection    - avoid ACE/ARB for now     Fevers   - ID workup    - bladder stone as nidus?? -culture neg so far    - Abx IV per ID       Thank you for the courtesy of this consult. We will follow this patient with you.   Management is subject to change if new information becomes available or patient condition changes. 69 y o male with hx of HTN, presenting with left flank pains and suprapubic pains with possible retention and urinary   symptomts of UTI, noted to have bladder calcluli and bilat hydro     JULIANN - Cr rising sec to  obstructive uropathy   bilat hydro on Renal USS w retention - now has peterson    Urology followup    flomax      HTN - stable    - goal 's  to avoid overcorrection    - avoid ACE/ARB for now     Overnight Fevers    - ID workup    - bladder stone as nidus?? -culture neg so far    - Abx IV per ID       Thank you for the courtesy of this consult. We will follow this patient with you.   Management is subject to change if new information becomes available or patient condition changes. 69 y o male with hx of HTN, presenting with left flank pains and suprapubic pains with possible retention and urinary   symptomts of UTI, noted to have bladder calcluli and bilat hydro     JULIANN - Cr rising sec to  obstructive uropathy   labs pending today =- were improving after peterson yest   bilat hydro on Renal USS w retention - now has peterson    Urology followup    flomax      HTN - stable    - goal 's  to avoid overcorrection    - avoid ACE/ARB for now     Overnight Fevers    - ID workup    - bladder stone as nidus?? -culture neg so far    - Abx IV per ID       Thank you for the courtesy of this consult. We will follow this patient with you.   Management is subject to change if new information becomes available or patient condition changes.

## 2019-03-27 NOTE — PROGRESS NOTE ADULT - SUBJECTIVE AND OBJECTIVE BOX
The patient is a 70 yo male with Hx. of HTN, DM2, who presented in the ER for  dysuria, L flank pain , suprapubic pain for 3 days.  He was foung with  severe HTN   and possible urinary retention   Since admission has been urinating well.  Ct abd + mild Bilat hydro noted  no peterson was placed  no IV contrast     Today  pt feels well   no  discomfort    PAST MEDICAL & SURGICAL HISTORY:  Hypertension, unspecified type  No significant past surgical history    Home Medications:    MEDICATIONS  (STANDING):  amLODIPine   Tablet 5 milliGRAM(s) Oral daily  dextrose 5%. 1000 milliLiter(s) (50 mL/Hr) IV Continuous <Continuous>  dextrose 50% Injectable 12.5 Gram(s) IV Push once  dextrose 50% Injectable 25 Gram(s) IV Push once  dextrose 50% Injectable 25 Gram(s) IV Push once  gabapentin 100 milliGRAM(s) Oral at bedtime  heparin  Injectable 5000 Unit(s) SubCutaneous every 12 hours  influenza   Vaccine 0.5 milliLiter(s) IntraMuscular once  insulin lispro (HumaLOG) corrective regimen sliding scale   SubCutaneous three times a day before meals  metoprolol succinate ER 50 milliGRAM(s) Oral daily  piperacillin/tazobactam IVPB. 3.375 Gram(s) IV Intermittent every 8 hours  tamsulosin 0.4 milliGRAM(s) Oral at bedtime    MEDICATIONS  (PRN):  acetaminophen   Tablet .. 650 milliGRAM(s) Oral every 6 hours PRN Temp greater or equal to 38C (100.4F), Mild Pain (1 - 3)  dextrose 40% Gel 15 Gram(s) Oral once PRN Blood Glucose LESS THAN 70 milliGRAM(s)/deciliter  glucagon  Injectable 1 milliGRAM(s) IntraMuscular once PRN Glucose LESS THAN 70 milligrams/deciliter        Allergies    No Known Allergies    Intolerances        SOCIAL HISTORY:  Denies ETOh,Smoking,     FAMILY HISTORY:  No pertinent family history in first degree relatives      REVIEW OF SYSTEMS:    CONSTITUTIONAL: No weakness, fevers or chills  EYES/ENT: No visual changes;  No vertigo or throat pain   NECK: No pain or stiffness  RESPIRATORY: No cough, wheezing, hemoptysis; No shortness of breath  CARDIOVASCULAR: No chest pain or palpitations  GASTROINTESTINAL: No abdominal or epigastric pain. No nausea, vomiting, or hematemesis; No diarrhea or constipation. No melena or hematochezia.  GENITOURINARY: No dysuria, frequency or hematuria  NEUROLOGICAL: No numbness or weakness  SKIN: No itching, burning, rashes, or lesions   All other review of systems is negative unless indicated above.    Vital Signs Last 24 Hrs  T(C): 36.6 (27 Mar 2019 10:45), Max: 39.3 (26 Mar 2019 20:30)  T(F): 97.9 (27 Mar 2019 10:45), Max: 102.8 (26 Mar 2019 20:30)  HR: 50 (27 Mar 2019 10:45) (50 - 124)  BP: 124/78 (27 Mar 2019 10:45) (124/78 - 153/96)  BP(mean): --  RR: 16 (27 Mar 2019 10:45) (16 - 20)  SpO2: 99% (27 Mar 2019 10:45) (95% - 100%)  Wt(kg): --    I&O's Summary    26 Mar 2019 07:  -  27 Mar 2019 07:00  --------------------------------------------------------  IN: 240 mL / OUT: 3250 mL / NET: -3010 mL    27 Mar 2019 07:  -  27 Mar 2019 10:48  --------------------------------------------------------  IN: 240 mL / OUT: 0 mL / NET: 240 mL        PHYSICAL EXAM:    Constitutional: NAD  HEENT: PERRLA, EOMI,  MMM  Neck: No LAD, No JVD  Respiratory: CTAB  Cardiovascular: S1 and S2  Gastrointestinal: BS+, soft, NT/ND  Extremities: No peripheral edema  Neurological: A/O x 3, no focal deficits  Psychiatric: Normal mood, normal affect  : No Peterson  Skin: No rashes  Access: Not applicable    LABS:               141    |  106    |  21     ----------------------------<  128       26 Mar 2019 05:33  3.8     |  27     |  1.36     139    |  106    |  27     ----------------------------<  110       25 Mar 2019 15:57  3.7     |  23     |  2.13     138    |  104    |  24     ----------------------------<  113       25 Mar 2019 05:45  3.8     |  23     |  2.15     Ca    8.9        26 Mar 2019 05:33  Ca    8.2        25 Mar 2019 15:57    Phos  4.1       26 Mar 2019 05:33  Phos  3.8       25 Mar 2019 15:57      Urine Studies:  Urinalysis Basic - ( 25 Mar 2019 14:10 )    Color: Yellow / Appearance: Clear / S.010 / pH: x  Gluc: x / Ketone: Negative  / Bili: Negative / Urobili: Negative mg/dL   Blood: x / Protein: Negative mg/dL / Nitrite: Negative   Leuk Esterase: Trace / RBC: 3-5 /HPF / WBC 0-2   Sq Epi: x / Non Sq Epi: Negative / Bacteria: Occasional    Urine Microscopic-Add On (NC) (19 @ 14:10)    Red Blood Cell - Urine: 3-5 /HPF    White Blood Cell - Urine: 0-2    Bacteria: Occasional    Epithelial Cells: Negative    Protein/Creatinine Ratio Calculation: 0.3 Ratio ( @ 14:10)  Creatinine, Random Urine: 36.0 mg/dL ( @ 14:10)        RADIOLOGY & ADDITIONAL STUDIES:    EXAM:  US KIDNEYS AND BLADDER                            PROCEDURE DATE:  2019          INTERPRETATION:  US KIDNEYS AND BLADDER       HISTORY:  Renal Failure, acute kidney injury    COMPARISON: CT abdomen and pelvis 3/23/2019    PROCEDURE/TECHNIQUE: Multiple transverse and longitudinal sonographic   images of the bilateral kidneys, retroperitoneum and urinary bladder were   obtained.       KIDNEYS:  The RIGHT kidney measures 11.7 cm in length.  Mild hydronephrosis, no shadowing stone.  Preserved renal cortical thickness and echogenicity.      The LEFT kidney measures 11.6 cm in length.  Mild hydronephrosis, no shadowing stone.  Preserved renal cortical thickness and echogenicity.      URINARY BLADDER:        Stable small 9 mm calculusadjacent to the right ureterovesical junction.  Both ureteral jets were visualized using color Doppler.     Incomplete bladder emptying.    The aorta and inferior vena cava are normal in caliber.        IMPRESSION:    Stable mild bilateral hydronephrosis.    Stable 9 mm bladder calculus.    Incomplete bladder emptying.

## 2019-03-27 NOTE — PROGRESS NOTE ADULT - SUBJECTIVE AND OBJECTIVE BOX
Date of service: 19 @ 11:00    pt seen and examined   feels better   fevers down  no dysuria or pain  peterson placed d/t urinary retention    ROS: no fever or chills; denies dizziness, no HA, no SOB or cough, no abdominal pain, no diarrhea or constipation; no legs pain, no rashes    MEDICATIONS  (STANDING):  amLODIPine   Tablet 5 milliGRAM(s) Oral daily  dextrose 5%. 1000 milliLiter(s) (50 mL/Hr) IV Continuous <Continuous>  dextrose 50% Injectable 12.5 Gram(s) IV Push once  dextrose 50% Injectable 25 Gram(s) IV Push once  dextrose 50% Injectable 25 Gram(s) IV Push once  gabapentin 100 milliGRAM(s) Oral at bedtime  heparin  Injectable 5000 Unit(s) SubCutaneous every 12 hours  influenza   Vaccine 0.5 milliLiter(s) IntraMuscular once  insulin lispro (HumaLOG) corrective regimen sliding scale   SubCutaneous three times a day before meals  metoprolol succinate ER 50 milliGRAM(s) Oral daily  tamsulosin 0.4 milliGRAM(s) Oral at bedtime      Vital Signs Last 24 Hrs  T(C): 36.6 (27 Mar 2019 10:45), Max: 39.3 (26 Mar 2019 20:30)  T(F): 97.9 (27 Mar 2019 10:45), Max: 102.8 (26 Mar 2019 20:30)  HR: 50 (27 Mar 2019 10:45) (50 - 124)  BP: 124/78 (27 Mar 2019 10:45) (124/78 - 153/96)  BP(mean): --  RR: 16 (27 Mar 2019 10:45) (16 - 20)  SpO2: 99% (27 Mar 2019 10:45) (95% - 100%)    PE:  Constitutional: frail looking  HEENT: NC/AT, EOMI, PERRLA, conjunctivae clear; ears and nose atraumatic; pharynx benign  Neck: supple; thyroid not palpable  Back: no tenderness  Respiratory: respiratory effort normal; clear to auscultation  Cardiovascular: S1S2 regular, no murmurs  Abdomen: soft, not tender, not distended, positive BS; liver and spleen WNL  Genitourinary:  suprapubic tenderness  Lymphatic: no LN palpable  Musculoskeletal: no muscle tenderness, no joint swelling or tenderness  Extremities: no pedal edema  Neurological/ Psychiatric: moving all extremities  Skin: no rashes; no palpable lesions    Labs: all available labs reviewed                                              11.8   12.95 )-----------( 291      ( 26 Mar 2019 05:33 )             34.3         141  |  106  |  21  ----------------------------<  128<H>  3.8   |  27  |  1.36<H>    Ca    8.9      26 Mar 2019 05:33  Phos  4.1         TPro  x   /  Alb  3.1<L>  /  TBili  x   /  DBili  x   /  AST  x   /  ALT  x   /  AlkPhos  x         Urinalysis Basic - ( 23 Mar 2019 06:45 )    Color: Yellow / Appearance: Clear / S.015 / pH: x  Gluc: x / Ketone: Negative  / Bili: Negative / Urobili: Negative mg/dL   Blood: x / Protein: 30 mg/dL / Nitrite: Negative   Leuk Esterase: Negative / RBC: Negative /HPF / WBC 0-2   Sq Epi: x / Non Sq Epi: Occasional / Bacteria: Occasional        Radiology: all available radiological tests reviewed    EXAM:  CT BRAIN                            PROCEDURE DATE:  2019          INTERPRETATION:      CT head without IV contrast        CLINICAL INFORMATION:        Headache hypertension    TECHNIQUE: Contiguous axial 5 mm sections were obtained through the head.   Sagittal and coronal 2-D reformatted images were also obtained.   This   scan was performed using automatic exposure control (radiation dose   reduction software) to obtain a diagnostic image quality scan with   patient dose as low as reasonably achievable.     FINDINGS:   No previous examinations are available for review.    The brain demonstrates encephalomalacia and gliosis in the LEFT frontal   and temporal lobes, likely sequela of prior trauma. Mild anterior   periventricular and bifrontal some cortical white matter ischemia is   noted.   No acute cerebral cortical infarct is seen.  No intracranial   hemorrhage is found.  No mass effect is found in the brain.      The ventricles, sulci and basal cisterns appear unremarkable.         The orbits are unremarkable.  The paranasal sinuses are significant for   small retention cyst in the RIGHT sphenoid sinus.  The nasal cavity   appears intact.  The nasopharynx is symmetric.  The central skull base,   petrous temporal bones and calvarium remain intact.      IMPRESSION:   Mild encephalomalacia and gliosis in the LEFT frontal and   temporal lobes, likely sequela of prior trauma. Mild anterior   periventricular and bifrontal some cortical white matter ischemia is   noted.   Small retention cyst in RIGHT sphenoid sinus.      < from: CT Abdomen and Pelvis No Cont (19 @ 07:30) >  EXAM:  CT ABDOMEN AND PELVIS                            PROCEDURE DATE:  2019          INTERPRETATION:  HISTORY: Pain and difficulty urinating. Evaluate for   stone.    TECHNIQUE: CT scan of the abdomen and pelvis was performed from the domes  of the diaphragm to the symphysis pubis without oral or intravenous   contrast.  Coronal and sagittal reformatted images are provided.    COMPARISON: There is no prior study for comparison.    FINDINGS:   The lung bases are clear. The heart is not enlarged.    Evaluation of the parenchymal organs and vascular structures is limited   without intravenous contrast.     There is mild right hydroureteronephrosis secondary to a 3 mm stone near   the UVJ within the bladder. There is mild left hydroureteronephrosis   without obstructing ureteral calculus. There is no significant   perinephric stranding. The unenhanced appearance of the liver,   gallbladder, spleen, pancreas and adrenal glands is unremarkable. Small   left renal cyst is present.    Is no bowel obstruction, free air or ascites. The appendix is normal.   There is descending and sigmoid colon diverticulosis without evidence of   diverticulitis.    The abdominal aorta is normal in caliber. There is no retroperitoneal,   pelvic or inguinal adenopathy.     Mild the bladder is not thick-walled there is perivesicular fat stranding   suggesting inflammation. The prostate gland is enlarged.    There are no acute osseous abnormalities. There our degenerative changes   in the lumbar spine.    IMPRESSION:   Mild bilateral hydroureteronephrosis without significant perinephric   stranding. 3 mm bladder stone near the right UVJ,? Recently passed versus   chronic. Perivesicular fat stranding concerning for cystitis. Correlation   with urinalysis is recommended.          Advanced directives addressed: full resuscitation Date of service: 19 @ 11:00    pt seen and examined   have fever o/n 102.8  no dysuria or pain  peterson placed d/t urinary retention    ROS: no fever or chills; denies dizziness, no HA, no SOB or cough, no abdominal pain, no diarrhea or constipation; no legs pain, no rashes    MEDICATIONS  (STANDING):  amLODIPine   Tablet 5 milliGRAM(s) Oral daily  dextrose 5%. 1000 milliLiter(s) (50 mL/Hr) IV Continuous <Continuous>  dextrose 50% Injectable 12.5 Gram(s) IV Push once  dextrose 50% Injectable 25 Gram(s) IV Push once  dextrose 50% Injectable 25 Gram(s) IV Push once  gabapentin 100 milliGRAM(s) Oral at bedtime  heparin  Injectable 5000 Unit(s) SubCutaneous every 12 hours  influenza   Vaccine 0.5 milliLiter(s) IntraMuscular once  insulin lispro (HumaLOG) corrective regimen sliding scale   SubCutaneous three times a day before meals  metoprolol succinate ER 50 milliGRAM(s) Oral daily  tamsulosin 0.4 milliGRAM(s) Oral at bedtime      Vital Signs Last 24 Hrs  T(C): 36.6 (27 Mar 2019 10:45), Max: 39.3 (26 Mar 2019 20:30)  T(F): 97.9 (27 Mar 2019 10:45), Max: 102.8 (26 Mar 2019 20:30)  HR: 50 (27 Mar 2019 10:45) (50 - 124)  BP: 124/78 (27 Mar 2019 10:45) (124/78 - 153/96)  BP(mean): --  RR: 16 (27 Mar 2019 10:45) (16 - 20)  SpO2: 99% (27 Mar 2019 10:45) (95% - 100%)    PE:  Constitutional: frail looking  HEENT: NC/AT, EOMI, PERRLA, conjunctivae clear; ears and nose atraumatic; pharynx benign  Neck: supple; thyroid not palpable  Back: no tenderness  Respiratory: respiratory effort normal; clear to auscultation  Cardiovascular: S1S2 regular, no murmurs  Abdomen: soft, not tender, not distended, positive BS; liver and spleen WNL  Genitourinary:  suprapubic tenderness  Lymphatic: no LN palpable  Musculoskeletal: no muscle tenderness, no joint swelling or tenderness  Extremities: no pedal edema  Neurological/ Psychiatric: moving all extremities  Skin: no rashes; no palpable lesions    Labs: all available labs reviewed                                              11.8   12.95 )-----------( 291      ( 26 Mar 2019 05:33 )             34.3         141  |  106  |  21  ----------------------------<  128<H>  3.8   |  27  |  1.36<H>    Ca    8.9      26 Mar 2019 05:33  Phos  4.1         TPro  x   /  Alb  3.1<L>  /  TBili  x   /  DBili  x   /  AST  x   /  ALT  x   /  AlkPhos  x         Urinalysis Basic - ( 23 Mar 2019 06:45 )    Color: Yellow / Appearance: Clear / S.015 / pH: x  Gluc: x / Ketone: Negative  / Bili: Negative / Urobili: Negative mg/dL   Blood: x / Protein: 30 mg/dL / Nitrite: Negative   Leuk Esterase: Negative / RBC: Negative /HPF / WBC 0-2   Sq Epi: x / Non Sq Epi: Occasional / Bacteria: Occasional        Radiology: all available radiological tests reviewed    EXAM:  CT BRAIN                            PROCEDURE DATE:  2019          INTERPRETATION:      CT head without IV contrast        CLINICAL INFORMATION:        Headache hypertension    TECHNIQUE: Contiguous axial 5 mm sections were obtained through the head.   Sagittal and coronal 2-D reformatted images were also obtained.   This   scan was performed using automatic exposure control (radiation dose   reduction software) to obtain a diagnostic image quality scan with   patient dose as low as reasonably achievable.     FINDINGS:   No previous examinations are available for review.    The brain demonstrates encephalomalacia and gliosis in the LEFT frontal   and temporal lobes, likely sequela of prior trauma. Mild anterior   periventricular and bifrontal some cortical white matter ischemia is   noted.   No acute cerebral cortical infarct is seen.  No intracranial   hemorrhage is found.  No mass effect is found in the brain.      The ventricles, sulci and basal cisterns appear unremarkable.         The orbits are unremarkable.  The paranasal sinuses are significant for   small retention cyst in the RIGHT sphenoid sinus.  The nasal cavity   appears intact.  The nasopharynx is symmetric.  The central skull base,   petrous temporal bones and calvarium remain intact.      IMPRESSION:   Mild encephalomalacia and gliosis in the LEFT frontal and   temporal lobes, likely sequela of prior trauma. Mild anterior   periventricular and bifrontal some cortical white matter ischemia is   noted.   Small retention cyst in RIGHT sphenoid sinus.      < from: CT Abdomen and Pelvis No Cont (19 @ 07:30) >  EXAM:  CT ABDOMEN AND PELVIS                            PROCEDURE DATE:  2019          INTERPRETATION:  HISTORY: Pain and difficulty urinating. Evaluate for   stone.    TECHNIQUE: CT scan of the abdomen and pelvis was performed from the domes  of the diaphragm to the symphysis pubis without oral or intravenous   contrast.  Coronal and sagittal reformatted images are provided.    COMPARISON: There is no prior study for comparison.    FINDINGS:   The lung bases are clear. The heart is not enlarged.    Evaluation of the parenchymal organs and vascular structures is limited   without intravenous contrast.     There is mild right hydroureteronephrosis secondary to a 3 mm stone near   the UVJ within the bladder. There is mild left hydroureteronephrosis   without obstructing ureteral calculus. There is no significant   perinephric stranding. The unenhanced appearance of the liver,   gallbladder, spleen, pancreas and adrenal glands is unremarkable. Small   left renal cyst is present.    Is no bowel obstruction, free air or ascites. The appendix is normal.   There is descending and sigmoid colon diverticulosis without evidence of   diverticulitis.    The abdominal aorta is normal in caliber. There is no retroperitoneal,   pelvic or inguinal adenopathy.     Mild the bladder is not thick-walled there is perivesicular fat stranding   suggesting inflammation. The prostate gland is enlarged.    There are no acute osseous abnormalities. There our degenerative changes   in the lumbar spine.    IMPRESSION:   Mild bilateral hydroureteronephrosis without significant perinephric   stranding. 3 mm bladder stone near the right UVJ,? Recently passed versus   chronic. Perivesicular fat stranding concerning for cystitis. Correlation   with urinalysis is recommended.          Advanced directives addressed: full resuscitation

## 2019-03-27 NOTE — PROGRESS NOTE ADULT - ASSESSMENT
68 yo male with Hx. of HTN, DM2, who presented in the ER for dysuria, L flank pain, suprapubic pain for 3 days.  He was found with  severe HTN in the ER and referred for telemetry admission. also found with wbc ct 11-12, UA unremarkable CT abd/pelvis with perivesicular fat stranding, cystitis, b/l hydroureteronephrosis, 3 mm bladder stone in R UVJ ? passed stone was given IV rocephin for UTI.     1. cystitis. bladder stone. urinary retention. edilma  - peterson placed d/t retention, Cr improving, temps down  - urology eval noted  - s/p rocephin 1gmqdaily #3 s/p zosyn  would stop further zosyn and leave on rocephin  - ? passed stone  - continue with abx coverage   - urine cx/blood cx no growth   - on dc plan for po ceftin complete 7 day course  - monitor temps  - tolerating abx well so far; no side effects noted  - reason for abx use and side effects reviewed with patient  - f/u cbc    2. other issues - HTN/DM - care per medicine 70 yo male with Hx. of HTN, DM2, who presented in the ER for dysuria, L flank pain, suprapubic pain for 3 days.  He was found with  severe HTN in the ER and referred for telemetry admission. also found with wbc ct 11-12, UA unremarkable CT abd/pelvis with perivesicular fat stranding, cystitis, b/l hydroureteronephrosis, 3 mm bladder stone in R UVJ ? passed stone was given IV rocephin for UTI.     1. fever. cystitis. bladder stone. urinary retention. edilma  - peterson placed d/t retention, Cr improving, noted with fever  - urology eval noted  - s/p rocephin 1gmqdaily #3 abx broadened to IV zosyn for further resistant antimicrobial coverage  - ? passed stone  - continue with abx coverage   - urine cx/blood cx no growth   - on dc plan for po ceftin complete 7 day course  - monitor temps  - tolerating abx well so far; no side effects noted  - reason for abx use and side effects reviewed with patient  - f/u cbc    2. other issues - HTN/DM - care per medicine

## 2019-03-27 NOTE — PROGRESS NOTE ADULT - SUBJECTIVE AND OBJECTIVE BOX
CHIEF COMPLAINT/diagnosis: uti/ obstructive uroopathy/ possible pyelo/ ARF    SUBJECTIVE: no complaints    REVIEW OF SYSTEMS:    CONSTITUTIONAL: No weakness, fevers or chills  EYES/ENT: No visual changes;  No vertigo or throat pain   NECK: No pain or stiffness  RESPIRATORY: No cough, wheezing, hemoptysis; No shortness of breath  CARDIOVASCULAR: No chest pain or palpitations  GASTROINTESTINAL: No abdominal or epigastric pain. No nausea, vomiting, or hematemesis; No diarrhea or constipation. No melena or hematochezia.  GENITOURINARY: No dysuria, frequency or hematuria  NEUROLOGICAL: No numbness or weakness  SKIN: No itching, burning, rashes, or lesions   All other review of systems is negative unless indicated above    Vital Signs Last 24 Hrs  T(C): 36.6 (27 Mar 2019 10:45), Max: 39.3 (26 Mar 2019 20:30)  T(F): 97.9 (27 Mar 2019 10:45), Max: 102.8 (26 Mar 2019 20:30)  HR: 50 (27 Mar 2019 10:45) (50 - 124)  BP: 124/78 (27 Mar 2019 10:45) (124/78 - 149/83)  BP(mean): --  RR: 16 (27 Mar 2019 10:45) (16 - 20)  SpO2: 99% (27 Mar 2019 10:45) (96% - 100%)    I&O's Summary    26 Mar 2019 07:01  -  27 Mar 2019 07:00  --------------------------------------------------------  IN: 240 mL / OUT: 3250 mL / NET: -3010 mL    27 Mar 2019 07:01  -  27 Mar 2019 15:44  --------------------------------------------------------  IN: 240 mL / OUT: 350 mL / NET: -110 mL        CAPILLARY BLOOD GLUCOSE      POCT Blood Glucose.: 211 mg/dL (27 Mar 2019 11:43)  POCT Blood Glucose.: 122 mg/dL (27 Mar 2019 08:18)  POCT Blood Glucose.: 140 mg/dL (26 Mar 2019 21:19)  POCT Blood Glucose.: 133 mg/dL (26 Mar 2019 17:28)      PHYSICAL EXAM:    Constitutional: NAD, awake and alert, well-developed  HEENT: PERR, EOMI, Normal Hearing, MMM  Neck: Soft and supple, No LAD, No JVD  Respiratory: Breath sounds are clear bilaterally, No wheezing, rales or rhonchi  Cardiovascular: S1 and S2, regular rate and rhythm, no Murmurs, gallops or rubs  Gastrointestinal: Bowel Sounds present, soft, nontender, nondistended, no guarding, no rebound  Extremities: No peripheral edema  Vascular: 2+ peripheral pulses  Neurological: A/O x 3, no focal deficits  Musculoskeletal: 5/5 strength b/l upper and lower extremities  Skin: No rashes    MEDICATIONS:  MEDICATIONS  (STANDING):  amLODIPine   Tablet 5 milliGRAM(s) Oral daily  dextrose 5%. 1000 milliLiter(s) (50 mL/Hr) IV Continuous <Continuous>  dextrose 50% Injectable 12.5 Gram(s) IV Push once  dextrose 50% Injectable 25 Gram(s) IV Push once  dextrose 50% Injectable 25 Gram(s) IV Push once  gabapentin 100 milliGRAM(s) Oral at bedtime  heparin  Injectable 5000 Unit(s) SubCutaneous every 12 hours  influenza   Vaccine 0.5 milliLiter(s) IntraMuscular once  insulin lispro (HumaLOG) corrective regimen sliding scale   SubCutaneous three times a day before meals  metoprolol succinate ER 50 milliGRAM(s) Oral daily  piperacillin/tazobactam IVPB. 3.375 Gram(s) IV Intermittent every 8 hours  tamsulosin 0.4 milliGRAM(s) Oral at bedtime      LABS: All Labs Reviewed:                        11.8   12.95 )-----------( 291      ( 26 Mar 2019 05:33 )             34.3     03-27    128<L>  |  93<L>  |  24<H>  ----------------------------<  205<H>  3.4<L>   |  28  |  1.47<H>    Ca    8.6      27 Mar 2019 10:10  Phos  3.3     03-27    TPro  x   /  Alb  3.0<L>  /  TBili  x   /  DBili  x   /  AST  x   /  ALT  x   /  AlkPhos  x   03-27          Blood Culture: 03-24 @ 18:49  Organism --  Gram Stain Blood -- Gram Stain --  Specimen Source .Blood None  Culture-Blood --    03-24 @ 01:06  Organism --  Gram Stain Blood -- Gram Stain --  Specimen Source .Urine Clean Catch (Midstream)  Culture-Blood --          Assessment:    70 yo male with Hx. of HTN, DM2, who presented in the ER for  dysuria, L flank pain , suprapubic pain for 3 days.  He was foung with  severe HTN in the ER     1-sepsis secondary to UTI w/ possible pyelo secondary to obstructie uropathy  -continued temp spikes  -no urine culture sent in ER  - infectious disease consult  -urine cultures and blood cultures sent  -change antibiotics from ceftiraxone  to Zosyn > patient spiking temps on IV cefriaxone; Spiked 102.9    2-unconbtrolled htn  -increase Toprolol to 50mg  -c/w amloidipine and hydrochlorthizide  -now significantly improved    3-dvt prophy- sc heparin    4-acute renal failure secondary to urinary retention  -hussein BPH  -c/w flomax 0.4mg  -c/w peterson catheter  -Cr now improving  back to baseline with peterson catheter  -explained to patient that he will likely need to be discharged with peterson catheter and outpatient removal of catheter

## 2019-03-28 LAB
ALBUMIN SERPL ELPH-MCNC: 2.7 G/DL — LOW (ref 3.3–5)
ANION GAP SERPL CALC-SCNC: 11 MMOL/L — SIGNIFICANT CHANGE UP (ref 5–17)
BUN SERPL-MCNC: 16 MG/DL — SIGNIFICANT CHANGE UP (ref 7–23)
CALCIUM SERPL-MCNC: 8.2 MG/DL — LOW (ref 8.5–10.1)
CHLORIDE SERPL-SCNC: 95 MMOL/L — LOW (ref 96–108)
CO2 SERPL-SCNC: 25 MMOL/L — SIGNIFICANT CHANGE UP (ref 22–31)
CREAT SERPL-MCNC: 1.06 MG/DL — SIGNIFICANT CHANGE UP (ref 0.5–1.3)
GLUCOSE BLDC GLUCOMTR-MCNC: 122 MG/DL — HIGH (ref 70–99)
GLUCOSE BLDC GLUCOMTR-MCNC: 125 MG/DL — HIGH (ref 70–99)
GLUCOSE BLDC GLUCOMTR-MCNC: 143 MG/DL — HIGH (ref 70–99)
GLUCOSE BLDC GLUCOMTR-MCNC: 149 MG/DL — HIGH (ref 70–99)
GLUCOSE SERPL-MCNC: 136 MG/DL — HIGH (ref 70–99)
HCT VFR BLD CALC: 32.4 % — LOW (ref 39–50)
HGB BLD-MCNC: 11.2 G/DL — LOW (ref 13–17)
MCHC RBC-ENTMCNC: 29.8 PG — SIGNIFICANT CHANGE UP (ref 27–34)
MCHC RBC-ENTMCNC: 34.6 GM/DL — SIGNIFICANT CHANGE UP (ref 32–36)
MCV RBC AUTO: 86.2 FL — SIGNIFICANT CHANGE UP (ref 80–100)
NRBC # BLD: 0 /100 WBCS — SIGNIFICANT CHANGE UP (ref 0–0)
PHOSPHATE SERPL-MCNC: 2.6 MG/DL — SIGNIFICANT CHANGE UP (ref 2.5–4.5)
PLATELET # BLD AUTO: 317 K/UL — SIGNIFICANT CHANGE UP (ref 150–400)
POTASSIUM SERPL-MCNC: 3.3 MMOL/L — LOW (ref 3.5–5.3)
POTASSIUM SERPL-SCNC: 3.3 MMOL/L — LOW (ref 3.5–5.3)
RBC # BLD: 3.76 M/UL — LOW (ref 4.2–5.8)
RBC # FLD: 13.4 % — SIGNIFICANT CHANGE UP (ref 10.3–14.5)
SODIUM SERPL-SCNC: 131 MMOL/L — LOW (ref 135–145)
WBC # BLD: 14.73 K/UL — HIGH (ref 3.8–10.5)
WBC # FLD AUTO: 14.73 K/UL — HIGH (ref 3.8–10.5)

## 2019-03-28 PROCEDURE — 73200 CT UPPER EXTREMITY W/O DYE: CPT | Mod: 26,LT

## 2019-03-28 PROCEDURE — 74176 CT ABD & PELVIS W/O CONTRAST: CPT | Mod: 26

## 2019-03-28 PROCEDURE — 76376 3D RENDER W/INTRP POSTPROCES: CPT | Mod: 26

## 2019-03-28 RX ORDER — VANCOMYCIN HCL 1 G
750 VIAL (EA) INTRAVENOUS ONCE
Qty: 0 | Refills: 0 | Status: COMPLETED | OUTPATIENT
Start: 2019-03-28 | End: 2019-03-28

## 2019-03-28 RX ORDER — POTASSIUM CHLORIDE 20 MEQ
40 PACKET (EA) ORAL ONCE
Qty: 0 | Refills: 0 | Status: COMPLETED | OUTPATIENT
Start: 2019-03-28 | End: 2019-03-28

## 2019-03-28 RX ORDER — OXYCODONE HYDROCHLORIDE 5 MG/1
5 TABLET ORAL EVERY 6 HOURS
Qty: 0 | Refills: 0 | Status: DISCONTINUED | OUTPATIENT
Start: 2019-03-28 | End: 2019-04-02

## 2019-03-28 RX ORDER — VANCOMYCIN HCL 1 G
VIAL (EA) INTRAVENOUS
Qty: 0 | Refills: 0 | Status: DISCONTINUED | OUTPATIENT
Start: 2019-03-28 | End: 2019-03-29

## 2019-03-28 RX ORDER — IBUPROFEN 200 MG
400 TABLET ORAL EVERY 12 HOURS
Qty: 0 | Refills: 0 | Status: DISCONTINUED | OUTPATIENT
Start: 2019-03-28 | End: 2019-03-30

## 2019-03-28 RX ORDER — VANCOMYCIN HCL 1 G
750 VIAL (EA) INTRAVENOUS EVERY 12 HOURS
Qty: 0 | Refills: 0 | Status: DISCONTINUED | OUTPATIENT
Start: 2019-03-29 | End: 2019-03-29

## 2019-03-28 RX ADMIN — Medication 400 MILLIGRAM(S): at 20:58

## 2019-03-28 RX ADMIN — Medication 50 MILLIGRAM(S): at 05:18

## 2019-03-28 RX ADMIN — Medication 400 MILLIGRAM(S): at 22:35

## 2019-03-28 RX ADMIN — OXYCODONE HYDROCHLORIDE 5 MILLIGRAM(S): 5 TABLET ORAL at 19:27

## 2019-03-28 RX ADMIN — HEPARIN SODIUM 5000 UNIT(S): 5000 INJECTION INTRAVENOUS; SUBCUTANEOUS at 05:18

## 2019-03-28 RX ADMIN — GABAPENTIN 100 MILLIGRAM(S): 400 CAPSULE ORAL at 20:58

## 2019-03-28 RX ADMIN — AMLODIPINE BESYLATE 5 MILLIGRAM(S): 2.5 TABLET ORAL at 05:18

## 2019-03-28 RX ADMIN — Medication 650 MILLIGRAM(S): at 12:07

## 2019-03-28 RX ADMIN — Medication 40 MILLIEQUIVALENT(S): at 14:38

## 2019-03-28 RX ADMIN — PIPERACILLIN AND TAZOBACTAM 25 GRAM(S): 4; .5 INJECTION, POWDER, LYOPHILIZED, FOR SOLUTION INTRAVENOUS at 14:38

## 2019-03-28 RX ADMIN — TAMSULOSIN HYDROCHLORIDE 0.4 MILLIGRAM(S): 0.4 CAPSULE ORAL at 20:57

## 2019-03-28 RX ADMIN — PIPERACILLIN AND TAZOBACTAM 25 GRAM(S): 4; .5 INJECTION, POWDER, LYOPHILIZED, FOR SOLUTION INTRAVENOUS at 21:29

## 2019-03-28 RX ADMIN — HEPARIN SODIUM 5000 UNIT(S): 5000 INJECTION INTRAVENOUS; SUBCUTANEOUS at 18:38

## 2019-03-28 RX ADMIN — Medication 650 MILLIGRAM(S): at 12:37

## 2019-03-28 RX ADMIN — OXYCODONE HYDROCHLORIDE 5 MILLIGRAM(S): 5 TABLET ORAL at 15:13

## 2019-03-28 RX ADMIN — Medication 650 MILLIGRAM(S): at 05:18

## 2019-03-28 RX ADMIN — Medication 250 MILLIGRAM(S): at 16:27

## 2019-03-28 RX ADMIN — OXYCODONE HYDROCHLORIDE 5 MILLIGRAM(S): 5 TABLET ORAL at 20:20

## 2019-03-28 RX ADMIN — OXYCODONE HYDROCHLORIDE 5 MILLIGRAM(S): 5 TABLET ORAL at 14:43

## 2019-03-28 RX ADMIN — PIPERACILLIN AND TAZOBACTAM 25 GRAM(S): 4; .5 INJECTION, POWDER, LYOPHILIZED, FOR SOLUTION INTRAVENOUS at 05:18

## 2019-03-28 NOTE — PROGRESS NOTE ADULT - SUBJECTIVE AND OBJECTIVE BOX
The patient is a 70 yo male with Hx. of HTN, DM2, who presented in the ER for  dysuria, L flank pain , suprapubic pain for 3 days.  He was foung with  severe HTN  and possible urinary retention. Since admission has been urinating well.   Ct abd + mild Bilat hydro noted no peterson was palced     Today  pt feels well   no  discomfort  peterson draining well   still w fevers overnight    PAST MEDICAL & SURGICAL HISTORY:  Hypertension, unspecified type    MEDICATIONS  (STANDING):  amLODIPine   Tablet 5 milliGRAM(s) Oral daily  dextrose 5%. 1000 milliLiter(s) (50 mL/Hr) IV Continuous <Continuous>  dextrose 50% Injectable 12.5 Gram(s) IV Push once  dextrose 50% Injectable 25 Gram(s) IV Push once  dextrose 50% Injectable 25 Gram(s) IV Push once  gabapentin 100 milliGRAM(s) Oral at bedtime  heparin  Injectable 5000 Unit(s) SubCutaneous every 12 hours  influenza   Vaccine 0.5 milliLiter(s) IntraMuscular once  insulin lispro (HumaLOG) corrective regimen sliding scale   SubCutaneous three times a day before meals  metoprolol succinate ER 50 milliGRAM(s) Oral daily  piperacillin/tazobactam IVPB. 3.375 Gram(s) IV Intermittent every 8 hours  tamsulosin 0.4 milliGRAM(s) Oral at bedtime  vancomycin  IVPB        MEDICATIONS  (PRN):  acetaminophen   Tablet .. 650 milliGRAM(s) Oral every 6 hours PRN Temp greater or equal to 38C (100.4F), Mild Pain (1 - 3)  dextrose 40% Gel 15 Gram(s) Oral once PRN Blood Glucose LESS THAN 70 milliGRAM(s)/deciliter  glucagon  Injectable 1 milliGRAM(s) IntraMuscular once PRN Glucose LESS THAN 70 milligrams/deciliter  oxyCODONE    IR 5 milliGRAM(s) Oral every 6 hours PRN Moderate Pain (4 - 6)        Allergies    No Known Allergies    Intolerances        SOCIAL HISTORY:  Denies ETOh,Smoking,     FAMILY HISTORY:  No pertinent family history in first degree relatives      REVIEW OF SYSTEMS:    CONSTITUTIONAL: No weakness, fevers or chills  EYES/ENT: No visual changes;  No vertigo or throat pain   NECK: No pain or stiffness  RESPIRATORY: No cough, wheezing, hemoptysis; No shortness of breath  CARDIOVASCULAR: No chest pain or palpitations  GASTROINTESTINAL: No abdominal or epigastric pain. No nausea, vomiting, or hematemesis; No diarrhea or constipation. No melena or hematochezia.  GENITOURINARY: No dysuria, frequency or hematuria  NEUROLOGICAL: No numbness or weakness  SKIN: No itching, burning, rashes, or lesions   All other review of systems is negative unless indicated above.    Vital Signs Last 24 Hrs  T(C): 37.3 (28 Mar 2019 15:25), Max: 38.5 (28 Mar 2019 05:16)  T(F): 99.1 (28 Mar 2019 15:25), Max: 101.3 (28 Mar 2019 05:16)  HR: 106 (28 Mar 2019 15:25) (105 - 114)  BP: 129/83 (28 Mar 2019 15:25) (119/75 - 147/94)  BP(mean): --  RR: 17 (28 Mar 2019 15:25) (16 - 20)  SpO2: 94% (28 Mar 2019 15:25) (93% - 97%)    Wt(kg): --    I&O's Summary    26 Mar 2019 07:  -  27 Mar 2019 07:00  --------------------------------------------------------  IN: 240 mL / OUT: 3250 mL / NET: -3010 mL    27 Mar 2019 07:  -  27 Mar 2019 10:48  --------------------------------------------------------  IN: 240 mL / OUT: 0 mL / NET: 240 mL        PHYSICAL EXAM:    Constitutional: NAD  HEENT:  EOMI,  MMM  Neck: No LAD, No JVD  Respiratory: CTAB  Cardiovascular: S1 and S2  Gastrointestinal: BS+, soft, NT/ND  Extremities: No peripheral edema, LUE wrist + swelling and tenderness   Neurological: A/O x 3, no focal deficits  Psychiatric: Normal mood, normal affect  : No Peterson  Skin: No rashes  Access: Not applicable    LABS:               131    |  95     |  16     ----------------------------<  136       28 Mar 2019 05:44  3.3     |  25     |  1.06     128    |  93     |  24     ----------------------------<  205       27 Mar 2019 10:10  3.4     |  28     |  1.47     141    |  106    |  21     ----------------------------<  128       26 Mar 2019 05:33  3.8     |  27     |  1.36     Ca    8.2        28 Mar 2019 05:44  Ca    8.6        27 Mar 2019 10:10    Phos  2.6       28 Mar 2019 05:44  Phos  3.3       27 Mar 2019 10:10      TPro  x      /  Alb  2.7    /  TBili  x      /        28 Mar 2019 05:44  DBili  x      /  AST  x      /  ALT  x      /  AlkPhos  x        TPro  x      /  Alb  3.0    /  TBili  x      /        27 Mar 2019 10:10  DBili  x      /  AST  x      /  ALT  x      /  AlkPhos  x          Urine Studies:  Urinalysis Basic - ( 25 Mar 2019 14:10 )    Color: Yellow / Appearance: Clear / S.010 / pH: x  Gluc: x / Ketone: Negative  / Bili: Negative / Urobili: Negative mg/dL   Blood: x / Protein: Negative mg/dL / Nitrite: Negative   Leuk Esterase: Trace / RBC: 3-5 /HPF / WBC 0-2   Sq Epi: x / Non Sq Epi: Negative / Bacteria: Occasional    Urine Microscopic-Add On (NC) (19 @ 14:10)    Red Blood Cell - Urine: 3-5 /HPF    White Blood Cell - Urine: 0-2    Bacteria: Occasional    Epithelial Cells: Negative    Protein/Creatinine Ratio Calculation: 0.3 Ratio ( @ 14:10)  Creatinine, Random Urine: 36.0 mg/dL ( @ 14:10)        RADIOLOGY & ADDITIONAL STUDIES:    EXAM:  US KIDNEYS AND BLADDER                            PROCEDURE DATE:  2019          INTERPRETATION:  US KIDNEYS AND BLADDER       HISTORY:  Renal Failure, acute kidney injury    COMPARISON: CT abdomen and pelvis 3/23/2019    PROCEDURE/TECHNIQUE: Multiple transverse and longitudinal sonographic   images of the bilateral kidneys, retroperitoneum and urinary bladder were   obtained.       KIDNEYS:  The RIGHT kidney measures 11.7 cm in length.  Mild hydronephrosis, no shadowing stone.  Preserved renal cortical thickness and echogenicity.      The LEFT kidney measures 11.6 cm in length.  Mild hydronephrosis, no shadowing stone.  Preserved renal cortical thickness and echogenicity.      URINARY BLADDER:        Stable small 9 mm calculusadjacent to the right ureterovesical junction.  Both ureteral jets were visualized using color Doppler.     Incomplete bladder emptying.    The aorta and inferior vena cava are normal in caliber.        IMPRESSION:    Stable mild bilateral hydronephrosis.    Stable 9 mm bladder calculus.    Incomplete bladder emptying.

## 2019-03-28 NOTE — PROGRESS NOTE ADULT - ASSESSMENT
69 y o male with hx of HTN, presenting with left flank pains and suprapubic pains with possible retention and urinary   symptomts of UTI, noted to have bladder calcluli and bilat hydro     JULIANN - obstructive uropathy  Cr continues to improve  peterson per Urology   bilat hydro on Renal USS w retention - now has peterson - Ct abd hydro resolved   flomax po   monitor for obtructive diuresis   encourage po fluids off IVF  replete K     HTN - stable    - goal 's  to avoid overcorrection    - avoid ACE/ARB for now     Overnight Fevers    - ID workup  - ? cellulitis of wrist _ ID followup    - IV abx - vanco - monitor trough per ID     -culture neg so far      Thank you for the courtesy of this consult. We will follow this patient with you.   Management is subject to change if new information becomes available or patient condition changes.

## 2019-03-28 NOTE — PROGRESS NOTE ADULT - ASSESSMENT
68 yo male with Hx. of HTN, DM2, who presented in the ER for dysuria, L flank pain, suprapubic pain for 3 days.  He was found with  severe HTN in the ER and referred for telemetry admission. also found with wbc ct 11-12, UA unremarkable CT abd/pelvis with perivesicular fat stranding, cystitis, b/l hydroureteronephrosis, 3 mm bladder stone in R UVJ ? passed stone was given IV rocephin for UTI.     1. fever. L wrist-forearm cellulitis ? infected joint ? abscess. cystitis. bladder stone. urinary retention. edilma  - peterson placed d/t retention, Cr improving, repeat imaging resolved hydro  - start vancomycin for skin/soft tissue coverage for L wrist  - continue with IV zosyn #2   - per nursing pt did not IV in this site will confirm  - suggest CT vs US to eval further ? infected joint vs abscess  - consider ortho eval may need further aspiration   - continue with abx coverage   - urine cx/blood cx no growth   - monitor temps  - tolerating abx well so far; no side effects noted  - reason for abx use and side effects reviewed with patient  - f/u cbc    2. other issues - HTN/DM - care per medicine 68 yo male with Hx. of HTN, DM2, who presented in the ER for dysuria, L flank pain, suprapubic pain for 3 days.  He was found with  severe HTN in the ER and referred for telemetry admission. also found with wbc ct 11-12, UA unremarkable CT abd/pelvis with perivesicular fat stranding, cystitis, b/l hydroureteronephrosis, 3 mm bladder stone in R UVJ ? passed stone was given IV rocephin for UTI.     1. fever. L wrist-forearm effusion/cellulitis. cystitis. bladder stone. urinary retention. edilma  - peterson placed d/t retention, Cr improving, repeat imaging resolved hydro  - start vancomycin for skin/soft tissue coverage for L wrist  - continue with IV zosyn #2   - per nursing pt did not IV in this site will confirm  - suggest CT vs US to eval L wrist effusion, may need further aspiration   - continue with abx coverage   - urine cx/blood cx no growth   - monitor temps  - tolerating abx well so far; no side effects noted  - reason for abx use and side effects reviewed with patient  - f/u cbc    2. other issues - HTN/DM - care per medicine 70 yo male with Hx. of HTN, DM2, who presented in the ER for dysuria, L flank pain, suprapubic pain for 3 days.  He was found with  severe HTN in the ER and referred for telemetry admission. also found with wbc ct 11-12, UA unremarkable CT abd/pelvis with perivesicular fat stranding, cystitis, b/l hydroureteronephrosis, 3 mm bladder stone in R UVJ ? passed stone was given IV rocephin for UTI.     1. fever. L wrist-forearm effusion/cellulitis. cystitis. bladder stone. urinary retention. edilma  - peterson placed d/t retention, Cr improving, repeat imaging resolved hydro  - start vancomycin for skin/soft tissue coverage for L wrist  - continue with IV zosyn #2   - per nursing pt did not IV in this site will confirm  - suggest CT vs US to eval L wrist effusion, may need further aspiration/ortho eval  - continue with abx coverage   - urine cx/blood cx no growth   - monitor temps  - tolerating abx well so far; no side effects noted  - reason for abx use and side effects reviewed with patient  - f/u cbc    2. other issues - HTN/DM - care per medicine

## 2019-03-28 NOTE — PROGRESS NOTE ADULT - SUBJECTIVE AND OBJECTIVE BOX
Date of service: 19 @ 12:57    pt seen and examined   noted with persistent fevers  L wrist with increased edema/pain/warmth/erythema/tenderness over past 24 hours  reports previous injury to wrist   peterson placed d/t urinary retention    ROS: denies dizziness, no HA, no SOB or cough, no abdominal pain, no diarrhea or constipation; no legs pain      MEDICATIONS  (STANDING):  amLODIPine   Tablet 5 milliGRAM(s) Oral daily  dextrose 5%. 1000 milliLiter(s) (50 mL/Hr) IV Continuous <Continuous>  dextrose 50% Injectable 12.5 Gram(s) IV Push once  dextrose 50% Injectable 25 Gram(s) IV Push once  dextrose 50% Injectable 25 Gram(s) IV Push once  gabapentin 100 milliGRAM(s) Oral at bedtime  heparin  Injectable 5000 Unit(s) SubCutaneous every 12 hours  influenza   Vaccine 0.5 milliLiter(s) IntraMuscular once  insulin lispro (HumaLOG) corrective regimen sliding scale   SubCutaneous three times a day before meals  metoprolol succinate ER 50 milliGRAM(s) Oral daily  piperacillin/tazobactam IVPB. 3.375 Gram(s) IV Intermittent every 8 hours  potassium chloride    Tablet ER 40 milliEquivalent(s) Oral once  tamsulosin 0.4 milliGRAM(s) Oral at bedtime  vancomycin  IVPB          Vital Signs Last 24 Hrs  T(C): 38.3 (28 Mar 2019 12:04), Max: 38.5 (28 Mar 2019 05:16)  T(F): 101 (28 Mar 2019 12:04), Max: 101.3 (28 Mar 2019 05:16)  HR: 107 (28 Mar 2019 12:04) (105 - 114)  BP: 137/77 (28 Mar 2019 12:04) (119/75 - 147/94)  BP(mean): --  RR: 20 (28 Mar 2019 12:04) (16 - 20)  SpO2: 93% (28 Mar 2019 12:04) (93% - 97%)      PE:  Constitutional: frail looking  HEENT: NC/AT, EOMI, PERRLA, conjunctivae clear; ears and nose atraumatic; pharynx benign  Neck: supple; thyroid not palpable  Back: no tenderness  Respiratory: decreased breath sounds  Cardiovascular: S1S2 regular, no murmurs  Abdomen: soft, not tender, not distended, positive BS; liver and spleen WNL  Genitourinary: suprapubic tenderness  Lymphatic: no LN palpable  Musculoskeletal: no muscle tenderness, no joint swelling or tenderness  Extremities: no pedal edema  Neurological/ Psychiatric: moving all extremities  Skin: L wrist/forearm with swelling/erythema/warmth/effusion no palpable lesions    Labs: all available labs reviewed                                              11.2   14.73 )-----------( 317      ( 28 Mar 2019 05:44 )             32.4         131<L>  |  95<L>  |  16  ----------------------------<  136<H>  3.3<L>   |  25  |  1.06    Ca    8.2<L>      28 Mar 2019 05:44  Phos  2.6         TPro  x   /  Alb  2.7<L>  /  TBili  x   /  DBili  x   /  AST  x   /  ALT  x   /  AlkPhos  x           Urinalysis Basic - ( 23 Mar 2019 06:45 )    Color: Yellow / Appearance: Clear / S.015 / pH: x  Gluc: x / Ketone: Negative  / Bili: Negative / Urobili: Negative mg/dL   Blood: x / Protein: 30 mg/dL / Nitrite: Negative   Leuk Esterase: Negative / RBC: Negative /HPF / WBC 0-2   Sq Epi: x / Non Sq Epi: Occasional / Bacteria: Occasional      Radiology: all available radiological tests reviewed    EXAM:  CT BRAIN                            PROCEDURE DATE:  2019          INTERPRETATION:      CT head without IV contrast        CLINICAL INFORMATION:        Headache hypertension    TECHNIQUE: Contiguous axial 5 mm sections were obtained through the head.   Sagittal and coronal 2-D reformatted images were also obtained.   This   scan was performed using automatic exposure control (radiation dose   reduction software) to obtain a diagnostic image quality scan with   patient dose as low as reasonably achievable.     FINDINGS:   No previous examinations are available for review.    The brain demonstrates encephalomalacia and gliosis in the LEFT frontal   and temporal lobes, likely sequela of prior trauma. Mild anterior   periventricular and bifrontal some cortical white matter ischemia is   noted.   No acute cerebral cortical infarct is seen.  No intracranial   hemorrhage is found.  No mass effect is found in the brain.      The ventricles, sulci and basal cisterns appear unremarkable.         The orbits are unremarkable.  The paranasal sinuses are significant for   small retention cyst in the RIGHT sphenoid sinus.  The nasal cavity   appears intact.  The nasopharynx is symmetric.  The central skull base,   petrous temporal bones and calvarium remain intact.      IMPRESSION:   Mild encephalomalacia and gliosis in the LEFT frontal and   temporal lobes, likely sequela of prior trauma. Mild anterior   periventricular and bifrontal some cortical white matter ischemia is   noted.   Small retention cyst in RIGHT sphenoid sinus.      EXAM:  CT ABDOMEN AND PELVIS                            PROCEDURE DATE:  2019          INTERPRETATION:  HISTORY: Pain and difficulty urinating. Evaluate for   stone.    TECHNIQUE: CT scan of the abdomen and pelvis was performed from the domes  of the diaphragm to the symphysis pubis without oral or intravenous   contrast.  Coronal and sagittal reformatted images are provided.    COMPARISON: There is no prior study for comparison.    FINDINGS:   The lung bases are clear. The heart is not enlarged.    Evaluation of the parenchymal organs and vascular structures is limited   without intravenous contrast.     There is mild right hydroureteronephrosis secondary to a 3 mm stone near   the UVJ within the bladder. There is mild left hydroureteronephrosis   without obstructing ureteral calculus. There is no significant   perinephric stranding. The unenhanced appearance of the liver,   gallbladder, spleen, pancreas and adrenal glands is unremarkable. Small   left renal cyst is present.    Is no bowel obstruction, free air or ascites. The appendix is normal.   There is descending and sigmoid colon diverticulosis without evidence of   diverticulitis.    The abdominal aorta is normal in caliber. There is no retroperitoneal,   pelvic or inguinal adenopathy.     Mild the bladder is not thick-walled there is perivesicular fat stranding   suggesting inflammation. The prostate gland is enlarged.    There are no acute osseous abnormalities. There our degenerative changes   in the lumbar spine.    IMPRESSION:   Mild bilateral hydroureteronephrosis without significant perinephric   stranding. 3 mm bladder stone near the right UVJ,? Recently passed versus   chronic. Perivesicular fat stranding concerning for cystitis. Correlation   with urinalysis is recommended.          Advanced directives addressed: full resuscitation

## 2019-03-28 NOTE — PROGRESS NOTE ADULT - SUBJECTIVE AND OBJECTIVE BOX
CHIEF COMPLAINT/Diagnosis: UTI/ possible pyelo/ sepsis    SUBJECTIVE: no complaints    REVIEW OF SYSTEMS:    CONSTITUTIONAL: No weakness, fevers or chills  EYES/ENT: No visual changes;  No vertigo or throat pain   NECK: No pain or stiffness  RESPIRATORY: No cough, wheezing, hemoptysis; No shortness of breath  CARDIOVASCULAR: No chest pain or palpitations  GASTROINTESTINAL: No abdominal or epigastric pain. No nausea, vomiting, or hematemesis; No diarrhea or constipation. No melena or hematochezia.  GENITOURINARY: No dysuria, frequency or hematuria  NEUROLOGICAL: No numbness or weakness  SKIN: No itching, burning, rashes, or lesions   All other review of systems is negative unless indicated above    Vital Signs Last 24 Hrs  T(C): 37.1 (28 Mar 2019 06:20), Max: 38.5 (28 Mar 2019 05:16)  T(F): 98.8 (28 Mar 2019 06:20), Max: 101.3 (28 Mar 2019 05:16)  HR: 105 (28 Mar 2019 05:45) (50 - 114)  BP: 139/84 (28 Mar 2019 05:16) (119/75 - 147/94)  BP(mean): --  RR: 16 (28 Mar 2019 05:16) (16 - 16)  SpO2: 97% (28 Mar 2019 05:16) (94% - 99%)    I&O's Summary    27 Mar 2019 07:01  -  28 Mar 2019 07:00  --------------------------------------------------------  IN: 240 mL / OUT: 3850 mL / NET: -3610 mL        CAPILLARY BLOOD GLUCOSE      POCT Blood Glucose.: 149 mg/dL (28 Mar 2019 08:20)  POCT Blood Glucose.: 142 mg/dL (27 Mar 2019 21:15)  POCT Blood Glucose.: 133 mg/dL (27 Mar 2019 16:46)  POCT Blood Glucose.: 211 mg/dL (27 Mar 2019 11:43)      PHYSICAL EXAM:    Constitutional: NAD, awake and alert, well-developed  HEENT: PERR, EOMI, Normal Hearing, MMM  Neck: Soft and supple, No LAD, No JVD  Respiratory: Breath sounds are clear bilaterally, No wheezing, rales or rhonchi  Cardiovascular: S1 and S2, regular rate and rhythm, no Murmurs, gallops or rubs  Gastrointestinal: Bowel Sounds present, soft, nontender, nondistended, no guarding, no rebound  Extremities: No peripheral edema  Vascular: 2+ peripheral pulses  Neurological: A/O x 3, no focal deficits  Musculoskeletal: 5/5 strength b/l upper and lower extremities  Skin: No rashes    MEDICATIONS:  MEDICATIONS  (STANDING):  amLODIPine   Tablet 5 milliGRAM(s) Oral daily  dextrose 5%. 1000 milliLiter(s) (50 mL/Hr) IV Continuous <Continuous>  dextrose 50% Injectable 12.5 Gram(s) IV Push once  dextrose 50% Injectable 25 Gram(s) IV Push once  dextrose 50% Injectable 25 Gram(s) IV Push once  gabapentin 100 milliGRAM(s) Oral at bedtime  heparin  Injectable 5000 Unit(s) SubCutaneous every 12 hours  influenza   Vaccine 0.5 milliLiter(s) IntraMuscular once  insulin lispro (HumaLOG) corrective regimen sliding scale   SubCutaneous three times a day before meals  metoprolol succinate ER 50 milliGRAM(s) Oral daily  piperacillin/tazobactam IVPB. 3.375 Gram(s) IV Intermittent every 8 hours  tamsulosin 0.4 milliGRAM(s) Oral at bedtime      LABS: All Labs Reviewed:                        11.2   14.73 )-----------( 317      ( 28 Mar 2019 05:44 )             32.4     03-28    131<L>  |  95<L>  |  16  ----------------------------<  136<H>  3.3<L>   |  25  |  1.06    Ca    8.2<L>      28 Mar 2019 05:44  Phos  2.6     03-28    TPro  x   /  Alb  2.7<L>  /  TBili  x   /  DBili  x   /  AST  x   /  ALT  x   /  AlkPhos  x   03-28          Blood Culture: 03-24 @ 18:49  Organism --  Gram Stain Blood -- Gram Stain --  Specimen Source .Blood None  Culture-Blood --    03-24 @ 01:06  Organism --  Gram Stain Blood -- Gram Stain --  Specimen Source .Urine Clean Catch (Midstream)  Culture-Blood --            Assessment:    70 yo male with Hx. of HTN, DM2, who presented in the ER for  dysuria, L flank pain , suprapubic pain for 3 days.  He was foung with  severe HTN in the ER     1-sepsis secondary to UTI w/ possible pyelo secondary to obstructive uropathy  -continued temp spikes  -no urine culture sent in ER  - infectious disease consult  -urine cultures and blood cultures sent  -change antibiotics from ceftiraxone  to Zosyn >   -ongoin temp spikes even on Zosyn >> will repeat CT abd/pelvis today rule out develping abcess or pyelo    2-unconbtrolled htn  -increase Toprolol to 50mg  -c/w amloidipine and hydrochlorthizide  -now significantly improved    3-dvt prophy- sc heparin    4-acute renal failure secondary to urinary retention  -hussein BPH  -c/w flomax 0.4mg  -c/w peterson catheter  -Cr now improving  back to baseline with peterson catheter  -explained to patient that he will likely need to be discharged with peterson catheter and outpatient removal of catheter

## 2019-03-29 LAB
ADD ON TEST-SPECIMEN IN LAB: SIGNIFICANT CHANGE UP
ALBUMIN SERPL ELPH-MCNC: 2.4 G/DL — LOW (ref 3.3–5)
ANION GAP SERPL CALC-SCNC: 8 MMOL/L — SIGNIFICANT CHANGE UP (ref 5–17)
BUN SERPL-MCNC: 21 MG/DL — SIGNIFICANT CHANGE UP (ref 7–23)
CALCIUM SERPL-MCNC: 8.4 MG/DL — LOW (ref 8.5–10.1)
CHLORIDE SERPL-SCNC: 95 MMOL/L — LOW (ref 96–108)
CO2 SERPL-SCNC: 25 MMOL/L — SIGNIFICANT CHANGE UP (ref 22–31)
CREAT SERPL-MCNC: 0.96 MG/DL — SIGNIFICANT CHANGE UP (ref 0.5–1.3)
ERYTHROCYTE [SEDIMENTATION RATE] IN BLOOD: 108 MM/HR — HIGH (ref 0–20)
GLUCOSE BLDC GLUCOMTR-MCNC: 114 MG/DL — HIGH (ref 70–99)
GLUCOSE BLDC GLUCOMTR-MCNC: 120 MG/DL — HIGH (ref 70–99)
GLUCOSE BLDC GLUCOMTR-MCNC: 127 MG/DL — HIGH (ref 70–99)
GLUCOSE BLDC GLUCOMTR-MCNC: 130 MG/DL — HIGH (ref 70–99)
GLUCOSE SERPL-MCNC: 129 MG/DL — HIGH (ref 70–99)
OSMOLALITY SERPL: 279 MOS/KG — LOW (ref 289–308)
OSMOLALITY UR: 473 MOS/KG — SIGNIFICANT CHANGE UP (ref 50–1200)
PHOSPHATE SERPL-MCNC: 2.7 MG/DL — SIGNIFICANT CHANGE UP (ref 2.5–4.5)
POTASSIUM SERPL-MCNC: 3.3 MMOL/L — LOW (ref 3.5–5.3)
POTASSIUM SERPL-SCNC: 3.3 MMOL/L — LOW (ref 3.5–5.3)
SODIUM SERPL-SCNC: 128 MMOL/L — LOW (ref 135–145)
URATE SERPL-MCNC: 5.3 MG/DL — SIGNIFICANT CHANGE UP (ref 3.4–8.8)

## 2019-03-29 PROCEDURE — 73110 X-RAY EXAM OF WRIST: CPT | Mod: 26,LT

## 2019-03-29 RX ORDER — POTASSIUM CHLORIDE 20 MEQ
40 PACKET (EA) ORAL ONCE
Qty: 0 | Refills: 0 | Status: COMPLETED | OUTPATIENT
Start: 2019-03-29 | End: 2019-03-29

## 2019-03-29 RX ORDER — SODIUM CHLORIDE 9 MG/ML
1000 INJECTION INTRAMUSCULAR; INTRAVENOUS; SUBCUTANEOUS
Qty: 0 | Refills: 0 | Status: DISCONTINUED | OUTPATIENT
Start: 2019-03-29 | End: 2019-03-31

## 2019-03-29 RX ORDER — VANCOMYCIN HCL 1 G
1000 VIAL (EA) INTRAVENOUS EVERY 12 HOURS
Qty: 0 | Refills: 0 | Status: DISCONTINUED | OUTPATIENT
Start: 2019-03-29 | End: 2019-04-02

## 2019-03-29 RX ADMIN — Medication 250 MILLIGRAM(S): at 17:06

## 2019-03-29 RX ADMIN — PIPERACILLIN AND TAZOBACTAM 25 GRAM(S): 4; .5 INJECTION, POWDER, LYOPHILIZED, FOR SOLUTION INTRAVENOUS at 13:02

## 2019-03-29 RX ADMIN — PIPERACILLIN AND TAZOBACTAM 25 GRAM(S): 4; .5 INJECTION, POWDER, LYOPHILIZED, FOR SOLUTION INTRAVENOUS at 21:24

## 2019-03-29 RX ADMIN — OXYCODONE HYDROCHLORIDE 5 MILLIGRAM(S): 5 TABLET ORAL at 20:52

## 2019-03-29 RX ADMIN — Medication 50 MILLIGRAM(S): at 05:45

## 2019-03-29 RX ADMIN — SODIUM CHLORIDE 75 MILLILITER(S): 9 INJECTION INTRAMUSCULAR; INTRAVENOUS; SUBCUTANEOUS at 21:38

## 2019-03-29 RX ADMIN — Medication 250 MILLIGRAM(S): at 05:23

## 2019-03-29 RX ADMIN — GABAPENTIN 100 MILLIGRAM(S): 400 CAPSULE ORAL at 20:52

## 2019-03-29 RX ADMIN — SODIUM CHLORIDE 75 MILLILITER(S): 9 INJECTION INTRAMUSCULAR; INTRAVENOUS; SUBCUTANEOUS at 12:03

## 2019-03-29 RX ADMIN — OXYCODONE HYDROCHLORIDE 5 MILLIGRAM(S): 5 TABLET ORAL at 02:31

## 2019-03-29 RX ADMIN — Medication 40 MILLIEQUIVALENT(S): at 12:09

## 2019-03-29 RX ADMIN — TAMSULOSIN HYDROCHLORIDE 0.4 MILLIGRAM(S): 0.4 CAPSULE ORAL at 20:52

## 2019-03-29 RX ADMIN — HEPARIN SODIUM 5000 UNIT(S): 5000 INJECTION INTRAVENOUS; SUBCUTANEOUS at 05:27

## 2019-03-29 RX ADMIN — OXYCODONE HYDROCHLORIDE 5 MILLIGRAM(S): 5 TABLET ORAL at 22:40

## 2019-03-29 RX ADMIN — OXYCODONE HYDROCHLORIDE 5 MILLIGRAM(S): 5 TABLET ORAL at 13:15

## 2019-03-29 RX ADMIN — OXYCODONE HYDROCHLORIDE 5 MILLIGRAM(S): 5 TABLET ORAL at 14:15

## 2019-03-29 RX ADMIN — HEPARIN SODIUM 5000 UNIT(S): 5000 INJECTION INTRAVENOUS; SUBCUTANEOUS at 17:06

## 2019-03-29 RX ADMIN — AMLODIPINE BESYLATE 5 MILLIGRAM(S): 2.5 TABLET ORAL at 05:45

## 2019-03-29 RX ADMIN — PIPERACILLIN AND TAZOBACTAM 25 GRAM(S): 4; .5 INJECTION, POWDER, LYOPHILIZED, FOR SOLUTION INTRAVENOUS at 06:57

## 2019-03-29 RX ADMIN — OXYCODONE HYDROCHLORIDE 5 MILLIGRAM(S): 5 TABLET ORAL at 01:10

## 2019-03-29 NOTE — CONSULT NOTE ADULT - REASON FOR ADMISSION
HTN, urine retention, hydronephrosis, bladder stone,
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
## 2019-03-29 NOTE — PROGRESS NOTE ADULT - ASSESSMENT
70 yo male with Hx. of HTN, DM2, who presented in the ER for dysuria, L flank pain, suprapubic pain for 3 days.  He was found with  severe HTN in the ER and referred for telemetry admission. also found with wbc ct 11-12, UA unremarkable CT abd/pelvis with perivesicular fat stranding, cystitis, b/l hydroureteronephrosis, 3 mm bladder stone in R UVJ ? passed stone was given IV rocephin for UTI.     1. fever. L wrist-forearm effusion/cellulitis. cystitis. bladder stone. urinary retention  - still w/ fever, CT L ext with L wrist edema/joint effusions/chronic deformity ?tenosynovitis ?septic arthritis (previous injury 20 yrs ago)  - ortho to eval, may need aspiration   - on vancomycin for skin/soft tissue coverage for L wrist increase to [id number] check trough prior to 4th dose #2  - continue with IV zosyn #3  - urine cx/blood cx no growth   - peterson placed d/t retention, Cr wnl repeat imaging resolved hydro and cystitis   - monitor temps  - tolerating abx well so far; no side effects noted  - reason for abx use and side effects reviewed with patient  - f/u cbc    2. other issues - HTN/DM - care per medicine 68 yo male with Hx. of HTN, DM2, who presented in the ER for dysuria, L flank pain, suprapubic pain for 3 days.  He was found with  severe HTN in the ER and referred for telemetry admission. also found with wbc ct 11-12, UA unremarkable CT abd/pelvis with perivesicular fat stranding, cystitis, b/l hydroureteronephrosis, 3 mm bladder stone in R UVJ ? passed stone was given IV rocephin for UTI.     1. fever. L wrist-forearm effusion/cellulitis. cystitis. bladder stone. urinary retention  - still w/ fever, CT L ext with L wrist edema/joint effusions/chronic deformity ?tenosynovitis ?septic arthritis (previous injury 20 yrs ago)  - ortho to eval, may need aspiration   - on vancomycin for skin/soft tissue coverage for L wrist increase to [id number] check trough prior to 4th dose #2  - check ESR/CRP  - continue with IV zosyn #3  - urine cx/blood cx no growth   - peterson placed d/t retention, Cr wnl repeat imaging resolved hydro and cystitis   - monitor temps  - tolerating abx well so far; no side effects noted  - reason for abx use and side effects reviewed with patient  - f/u cbc    2. other issues - HTN/DM - care per medicine

## 2019-03-29 NOTE — PROVIDER CONTACT NOTE (OTHER) - SITUATION
spoke with Geeta in [place] office, she will give consult to [place] office where MD is today. ([phone]) office aware of consult
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
## 2019-03-29 NOTE — PROGRESS NOTE ADULT - SUBJECTIVE AND OBJECTIVE BOX
CHIEF COMPLAINT/Diagnosis: UTI/ post obsstructive uropathy/ tendinsynovitis/ joint effusion of left wrist    SUBJECTIVE: no complaints, but still spiking daily temps    REVIEW OF SYSTEMS:    CONSTITUTIONAL: No weakness, fevers or chills  EYES/ENT: No visual changes;  No vertigo or throat pain   NECK: No pain or stiffness  RESPIRATORY: No cough, wheezing, hemoptysis; No shortness of breath  CARDIOVASCULAR: No chest pain or palpitations  GASTROINTESTINAL: No abdominal or epigastric pain. No nausea, vomiting, or hematemesis; No diarrhea or constipation. No melena or hematochezia.  GENITOURINARY: No dysuria, frequency or hematuria  NEUROLOGICAL: No numbness or weakness  SKIN: No itching, burning, rashes, or lesions   All other review of systems is negative unless indicated above    Vital Signs Last 24 Hrs  T(C): 37.4 (29 Mar 2019 11:19), Max: 39.1 (28 Mar 2019 20:24)  T(F): 99.3 (29 Mar 2019 11:19), Max: 102.4 (28 Mar 2019 20:24)  HR: 97 (29 Mar 2019 11:19) (93 - 110)  BP: 122/73 (29 Mar 2019 11:19) (120/81 - 127/82)  BP(mean): --  RR: 19 (29 Mar 2019 11:19) (19 - 20)  SpO2: 94% (29 Mar 2019 11:19) (94% - 97%)    I&O's Summary    28 Mar 2019 07:01  -  29 Mar 2019 07:00  --------------------------------------------------------  IN: 0 mL / OUT: 3000 mL / NET: -3000 mL    29 Mar 2019 07:01  -  29 Mar 2019 17:18  --------------------------------------------------------  IN: 1250 mL / OUT: 1650 mL / NET: -400 mL        CAPILLARY BLOOD GLUCOSE      POCT Blood Glucose.: 114 mg/dL (29 Mar 2019 16:46)  POCT Blood Glucose.: 120 mg/dL (29 Mar 2019 11:22)  POCT Blood Glucose.: 127 mg/dL (29 Mar 2019 07:18)  POCT Blood Glucose.: 125 mg/dL (28 Mar 2019 21:32)      PHYSICAL EXAM:    Constitutional: NAD, awake and alert, well-developed  HEENT: PERR, EOMI, Normal Hearing, MMM  Neck: Soft and supple, No LAD, No JVD  Respiratory: Breath sounds are clear bilaterally, No wheezing, rales or rhonchi  Cardiovascular: S1 and S2, regular rate and rhythm, no Murmurs, gallops or rubs  Gastrointestinal: Bowel Sounds present, soft, nontender, nondistended, no guarding, no rebound  Extremities: No peripheral edema  Vascular: 2+ peripheral pulses  Neurological: A/O x 3, no focal deficits  Musculoskeletal: 5/5 strength b/l upper and lower extremities  Skin: No rashes    MEDICATIONS:  MEDICATIONS  (STANDING):  amLODIPine   Tablet 5 milliGRAM(s) Oral daily  dextrose 5%. 1000 milliLiter(s) (50 mL/Hr) IV Continuous <Continuous>  dextrose 50% Injectable 12.5 Gram(s) IV Push once  dextrose 50% Injectable 25 Gram(s) IV Push once  dextrose 50% Injectable 25 Gram(s) IV Push once  gabapentin 100 milliGRAM(s) Oral at bedtime  heparin  Injectable 5000 Unit(s) SubCutaneous every 12 hours  influenza   Vaccine 0.5 milliLiter(s) IntraMuscular once  insulin lispro (HumaLOG) corrective regimen sliding scale   SubCutaneous three times a day before meals  metoprolol succinate ER 50 milliGRAM(s) Oral daily  piperacillin/tazobactam IVPB. 3.375 Gram(s) IV Intermittent every 8 hours  sodium chloride 0.9%. 1000 milliLiter(s) (75 mL/Hr) IV Continuous <Continuous>  tamsulosin 0.4 milliGRAM(s) Oral at bedtime  vancomycin  IVPB 1000 milliGRAM(s) IV Intermittent every 12 hours      LABS: All Labs Reviewed:                        11.2   14.73 )-----------( 317      ( 28 Mar 2019 05:44 )             32.4     03-29    128<L>  |  95<L>  |  21  ----------------------------<  129<H>  3.3<L>   |  25  |  0.96    Ca    8.4<L>      29 Mar 2019 07:10  Phos  2.7     03-29    TPro  x   /  Alb  2.4<L>  /  TBili  x   /  DBili  x   /  AST  x   /  ALT  x   /  AlkPhos  x   03-29          Blood Culture: 03-26 @ 22:51  Organism --  Gram Stain Blood -- Gram Stain --  Specimen Source .Blood None  Culture-Blood --    03-24 @ 18:49  Organism --  Gram Stain Blood -- Gram Stain --  Specimen Source .Blood None  Culture-Blood --          Assessment:    68 yo male with Hx. of HTN, DM2, who presented in the ER for  dysuria, L flank pain , suprapubic pain for 3 days.  He was foung with  severe HTN in the ER     1-sepsis secondary to UTI w/ possible pyelo secondary to obstructive uropathy  -continued temp spikes >> possibly related to left wrist joint > read below  -no urine culture sent in ER  - infectious disease consult  -urine cultures and blood cultures sent  -change antibiotics from ceftiraxone  to Zosyn >   -ongoin temp spikes even on Zosyn >> will repeat CT abd/pelvis today rule out develping abcess or pyelo    2-Left wrist joint effusion   -continued temp spikes despite being on abx for several days , suspicous for joint involvment. Left wrist  inflammed, hot, erthymatous  -orthopedic consult to drain joint and send cultures would be ideal  -c/w abx as per infectious disease      2-unconbtrolled htn  -increase Toprolol to 50mg  -c/w amloidipine and hydrochlorthizide  -now significantly improved    3-dvt prophy- sc heparin    4-acute renal failure secondary to urinary retention  -hussein BPH  -c/w flomax 0.4mg  -c/w peterson catheter  -Cr now improving  back to baseline with peterson catheter  -explained to patient that he will likely need to be discharged with peterson catheter and outpatient removal of catheter

## 2019-03-29 NOTE — PHARMACOTHERAPY INTERVENTION NOTE - COMMENTS
attempted to complete medication history via  #[id number]. Patient unable to confirm medications taken at home. Family member to bring in medication bottles from home in order to identify current meds.  will follow up attempted to complete medication history via  #[id number]. Patient unable to confirm medications taken at home. Will update medication history based on Dr. First

## 2019-03-29 NOTE — CONSULT NOTE ADULT - ASSESSMENT
A/P: A/P:  70 yo M w/ L wrist pain, tenosynovitis of extensor compartments per CT, r/o septic wrist:  -FU ESR CRP  -CT demonstrating tenosynovitis, no osteomyelitis or active infection noted  -pt able to range wrist with mild tenderness, chronic swelling and deformity of wrist, currently no clinical suspicion of septic wrist, pt currently on vanc and zosyn, negative blood/urine cultures  -will hold off on wrist aspiration for now, avoid bacterial seeding of joint if overlying cellulitis  -continue abx per ID  -medical management  -consider therapeutic left wrist joint aspiration if no improvement in symptoms/clinical picture  -will continue to follow  -Dr. Altamirano aware and in agreement with above plan A/P:  68 yo M w/ L wrist pain, tenosynovitis of extensor compartments per CT, clinical evidence of L hand cellulitis:  -FU ESR CRP  -CT demonstrating tenosynovitis, no osteomyelitis or active infection noted  -pt able to range wrist with mild tenderness, chronic swelling and deformity of wrist, currently no clinical suspicion of septic wrist, current clinical picture most suggestive of a left hand cellulitis  -pt currently on vanc and zosyn, negative blood/urine cultures  -will hold off on wrist aspiration, avoid bacterial seeding of joint of overlying cellulitis  -continue abx per ID  -medical management  -no acute orthopedic surgical intervention indicated at this time  -ortho stable  -Dr. Altamirano aware and in agreement with above plan

## 2019-03-30 LAB
ALBUMIN SERPL ELPH-MCNC: 2.5 G/DL — LOW (ref 3.3–5)
ANION GAP SERPL CALC-SCNC: 8 MMOL/L — SIGNIFICANT CHANGE UP (ref 5–17)
BUN SERPL-MCNC: 16 MG/DL — SIGNIFICANT CHANGE UP (ref 7–23)
CALCIUM SERPL-MCNC: 8.6 MG/DL — SIGNIFICANT CHANGE UP (ref 8.5–10.1)
CHLORIDE SERPL-SCNC: 101 MMOL/L — SIGNIFICANT CHANGE UP (ref 96–108)
CO2 SERPL-SCNC: 24 MMOL/L — SIGNIFICANT CHANGE UP (ref 22–31)
CREAT SERPL-MCNC: 0.92 MG/DL — SIGNIFICANT CHANGE UP (ref 0.5–1.3)
CRP SERPL-MCNC: 25.19 MG/DL — HIGH (ref 0–0.4)
CULTURE RESULTS: SIGNIFICANT CHANGE UP
CULTURE RESULTS: SIGNIFICANT CHANGE UP
GLUCOSE BLDC GLUCOMTR-MCNC: 101 MG/DL — HIGH (ref 70–99)
GLUCOSE BLDC GLUCOMTR-MCNC: 116 MG/DL — HIGH (ref 70–99)
GLUCOSE BLDC GLUCOMTR-MCNC: 118 MG/DL — HIGH (ref 70–99)
GLUCOSE BLDC GLUCOMTR-MCNC: 140 MG/DL — HIGH (ref 70–99)
GLUCOSE SERPL-MCNC: 127 MG/DL — HIGH (ref 70–99)
PHOSPHATE SERPL-MCNC: 2.6 MG/DL — SIGNIFICANT CHANGE UP (ref 2.5–4.5)
POTASSIUM SERPL-MCNC: 3.7 MMOL/L — SIGNIFICANT CHANGE UP (ref 3.5–5.3)
POTASSIUM SERPL-SCNC: 3.7 MMOL/L — SIGNIFICANT CHANGE UP (ref 3.5–5.3)
SODIUM SERPL-SCNC: 133 MMOL/L — LOW (ref 135–145)
SPECIMEN SOURCE: SIGNIFICANT CHANGE UP
SPECIMEN SOURCE: SIGNIFICANT CHANGE UP

## 2019-03-30 RX ADMIN — PIPERACILLIN AND TAZOBACTAM 25 GRAM(S): 4; .5 INJECTION, POWDER, LYOPHILIZED, FOR SOLUTION INTRAVENOUS at 07:22

## 2019-03-30 RX ADMIN — AMLODIPINE BESYLATE 5 MILLIGRAM(S): 2.5 TABLET ORAL at 05:47

## 2019-03-30 RX ADMIN — HEPARIN SODIUM 5000 UNIT(S): 5000 INJECTION INTRAVENOUS; SUBCUTANEOUS at 17:23

## 2019-03-30 RX ADMIN — GABAPENTIN 100 MILLIGRAM(S): 400 CAPSULE ORAL at 21:48

## 2019-03-30 RX ADMIN — Medication 250 MILLIGRAM(S): at 05:26

## 2019-03-30 RX ADMIN — HEPARIN SODIUM 5000 UNIT(S): 5000 INJECTION INTRAVENOUS; SUBCUTANEOUS at 05:47

## 2019-03-30 RX ADMIN — Medication 50 MILLIGRAM(S): at 05:47

## 2019-03-30 RX ADMIN — OXYCODONE HYDROCHLORIDE 5 MILLIGRAM(S): 5 TABLET ORAL at 07:01

## 2019-03-30 RX ADMIN — Medication 250 MILLIGRAM(S): at 17:23

## 2019-03-30 RX ADMIN — PIPERACILLIN AND TAZOBACTAM 25 GRAM(S): 4; .5 INJECTION, POWDER, LYOPHILIZED, FOR SOLUTION INTRAVENOUS at 14:14

## 2019-03-30 RX ADMIN — TAMSULOSIN HYDROCHLORIDE 0.4 MILLIGRAM(S): 0.4 CAPSULE ORAL at 21:48

## 2019-03-30 RX ADMIN — OXYCODONE HYDROCHLORIDE 5 MILLIGRAM(S): 5 TABLET ORAL at 05:19

## 2019-03-30 RX ADMIN — PIPERACILLIN AND TAZOBACTAM 25 GRAM(S): 4; .5 INJECTION, POWDER, LYOPHILIZED, FOR SOLUTION INTRAVENOUS at 21:48

## 2019-03-30 NOTE — PROGRESS NOTE ADULT - ASSESSMENT
68 yo male with Hx. of HTN, DM2, who presented in the ER for dysuria, L flank pain, suprapubic pain for 3 days.  He was found with  severe HTN in the ER and referred for telemetry admission. also found with wbc ct 11-12, UA unremarkable CT abd/pelvis with perivesicular fat stranding, cystitis, b/l hydroureteronephrosis, 3 mm bladder stone in R UVJ ? passed stone was given IV rocephin for UTI.     1. Febrile syndrome resolving. L wrist-forearm probable cellulitis. cystitis. bladder stone. urinary retention  - fever is down  - ortho evaluation appreciated: no clinical suspicion of septic wrist, current clinical picture most suggestive of a left hand cellulitis  - on vancomycin IV # 3 and zosyn IV # 4  -tolerating abx well so far; no side effects noted  - urine cx/blood cx no growth   -continue abx coverage  -obtain vancomycin trough level  - monitor temps  - f/u cbc    2. other issues - HTN/DM - care per medicine

## 2019-03-30 NOTE — PROGRESS NOTE ADULT - SUBJECTIVE AND OBJECTIVE BOX
CHIEF COMPLAINT/Diagnosis:  left wrist edema/ joint effusion/ cellulitis/ UTI/ post obstrutive uropathy    SUBJECTIVE: no complaints    REVIEW OF SYSTEMS:    CONSTITUTIONAL: No weakness, fevers or chills  EYES/ENT: No visual changes;  No vertigo or throat pain   NECK: No pain or stiffness  RESPIRATORY: No cough, wheezing, hemoptysis; No shortness of breath  CARDIOVASCULAR: No chest pain or palpitations  GASTROINTESTINAL: No abdominal or epigastric pain. No nausea, vomiting, or hematemesis; No diarrhea or constipation. No melena or hematochezia.  GENITOURINARY: No dysuria, frequency or hematuria  NEUROLOGICAL: No numbness or weakness  SKIN: No itching, burning, rashes, or lesions   All other review of systems is negative unless indicated above    Vital Signs Last 24 Hrs  T(C): 36.9 (30 Mar 2019 12:03), Max: 36.9 (29 Mar 2019 20:26)  T(F): 98.4 (30 Mar 2019 12:03), Max: 98.5 (29 Mar 2019 20:26)  HR: 100 (30 Mar 2019 12:03) (100 - 101)  BP: 121/74 (30 Mar 2019 12:03) (121/74 - 147/90)  BP(mean): --  RR: 17 (30 Mar 2019 12:03) (17 - 17)  SpO2: 97% (30 Mar 2019 12:03) (95% - 97%)    I&O's Summary    29 Mar 2019 07:01  -  30 Mar 2019 07:00  --------------------------------------------------------  IN: 1454 mL / OUT: 3450 mL / NET: -1996 mL    30 Mar 2019 07:01  -  30 Mar 2019 14:07  --------------------------------------------------------  IN: 731 mL / OUT: 0 mL / NET: 731 mL        CAPILLARY BLOOD GLUCOSE      POCT Blood Glucose.: 116 mg/dL (30 Mar 2019 11:58)  POCT Blood Glucose.: 118 mg/dL (30 Mar 2019 08:02)  POCT Blood Glucose.: 130 mg/dL (29 Mar 2019 22:37)  POCT Blood Glucose.: 114 mg/dL (29 Mar 2019 16:46)      PHYSICAL EXAM:    Constitutional: NAD, awake and alert, well-developed  HEENT: PERR, EOMI, Normal Hearing, MMM  Neck: Soft and supple, No LAD, No JVD  Respiratory: Breath sounds are clear bilaterally, No wheezing, rales or rhonchi  Cardiovascular: S1 and S2, regular rate and rhythm, no Murmurs, gallops or rubs  Gastrointestinal: Bowel Sounds present, soft, nontender, nondistended, no guarding, no rebound  Extremities: No peripheral edema  Vascular: 2+ peripheral pulses  Neurological: A/O x 3, no focal deficits  Musculoskeletal: 5/5 strength b/l upper and lower extremities  Skin: No rashes    MEDICATIONS:  MEDICATIONS  (STANDING):  amLODIPine   Tablet 5 milliGRAM(s) Oral daily  dextrose 5%. 1000 milliLiter(s) (50 mL/Hr) IV Continuous <Continuous>  dextrose 50% Injectable 12.5 Gram(s) IV Push once  dextrose 50% Injectable 25 Gram(s) IV Push once  dextrose 50% Injectable 25 Gram(s) IV Push once  gabapentin 100 milliGRAM(s) Oral at bedtime  heparin  Injectable 5000 Unit(s) SubCutaneous every 12 hours  influenza   Vaccine 0.5 milliLiter(s) IntraMuscular once  insulin lispro (HumaLOG) corrective regimen sliding scale   SubCutaneous three times a day before meals  metoprolol succinate ER 50 milliGRAM(s) Oral daily  piperacillin/tazobactam IVPB. 3.375 Gram(s) IV Intermittent every 8 hours  sodium chloride 0.9%. 1000 milliLiter(s) (75 mL/Hr) IV Continuous <Continuous>  tamsulosin 0.4 milliGRAM(s) Oral at bedtime  vancomycin  IVPB 1000 milliGRAM(s) IV Intermittent every 12 hours      LABS: All Labs Reviewed:    03-30    133<L>  |  101  |  16  ----------------------------<  127<H>  3.7   |  24  |  0.92    Ca    8.6      30 Mar 2019 06:34  Phos  2.6     03-30    TPro  x   /  Alb  2.5<L>  /  TBili  x   /  DBili  x   /  AST  x   /  ALT  x   /  AlkPhos  x   03-30          Blood Culture: 03-26 @ 22:51  Organism --  Gram Stain Blood -- Gram Stain --  Specimen Source .Blood None  Culture-Blood --        Assessment:    68 yo male with Hx. of HTN, DM2, who presented in the ER for  dysuria, L flank pain , suprapubic pain for 3 days.  He was foung with  severe HTN in the ER     1-sepsis secondary to UTI w/ possible pyelo secondary to obstructive uropathy  -continued temp spikes >> possibly related to left wrist joint > read below  -no urine culture sent in ER  - infectious disease consult  -urine cultures and blood cultures sent  -change antibiotics from ceftiraxone  to Zosyn >   -ongoin temp spikes even on Zosyn >> will repeat CT abd/pelvis today rule out develping abcess or pyelo    2-Left wrist joint effusion and possible co-existing wrist cellulitis  -continued temp spikes despite being on abx for several days , suspicous for joint involvment. Left wrist  inflammed, hot, erthymatous  -orthopedic consult apprecaited; recco no draining of the fluid  -c/w abx as per infectious disease      2-unconbtrolled htn  -increase Toprolol to 50mg  -c/w amloidipine and hydrochlorthizide  -now significantly improved    3-dvt prophy- sc heparin    4-acute renal failure secondary to urinary retention  -hussein BPH  -c/w flomax 0.4mg  -c/w peterson catheter  -Cr now improving  back to baseline with peterson catheter  -explained to patient that he will likely need to be discharged with peterson catheter and outpatient removal of catheter

## 2019-03-30 NOTE — PROGRESS NOTE ADULT - SUBJECTIVE AND OBJECTIVE BOX
Date of service: 19 @ 12:45    Alert; no complaints  Denies left wrist pain  Fever is down    ROS: no HA, no SOB or cough, no abdominal pain, no diarrhea or constipation; no dysuria, no legs pain, no new rashes    MEDICATIONS  (STANDING):  amLODIPine   Tablet 5 milliGRAM(s) Oral daily  dextrose 5%. 1000 milliLiter(s) (50 mL/Hr) IV Continuous <Continuous>  dextrose 50% Injectable 12.5 Gram(s) IV Push once  dextrose 50% Injectable 25 Gram(s) IV Push once  dextrose 50% Injectable 25 Gram(s) IV Push once  gabapentin 100 milliGRAM(s) Oral at bedtime  heparin  Injectable 5000 Unit(s) SubCutaneous every 12 hours  influenza   Vaccine 0.5 milliLiter(s) IntraMuscular once  insulin lispro (HumaLOG) corrective regimen sliding scale   SubCutaneous three times a day before meals  metoprolol succinate ER 50 milliGRAM(s) Oral daily  piperacillin/tazobactam IVPB. 3.375 Gram(s) IV Intermittent every 8 hours  sodium chloride 0.9%. 1000 milliLiter(s) (75 mL/Hr) IV Continuous <Continuous>  tamsulosin 0.4 milliGRAM(s) Oral at bedtime  vancomycin  IVPB 1000 milliGRAM(s) IV Intermittent every 12 hours      Vital Signs Last 24 Hrs  T(C): 36.9 (30 Mar 2019 12:03), Max: 36.9 (29 Mar 2019 20:26)  T(F): 98.4 (30 Mar 2019 12:03), Max: 98.5 (29 Mar 2019 20:26)  HR: 100 (30 Mar 2019 12:03) (100 - 101)  BP: 121/74 (30 Mar 2019 12:03) (121/74 - 147/90)  BP(mean): --  RR: 17 (30 Mar 2019 12:03) (17 - 17)  SpO2: 97% (30 Mar 2019 12:03) (95% - 97%)    Physical Exam:      Constitutional: frail looking  HEENT: NC/AT, EOMI, PERRLA, conjunctivae clear  Neck: supple; thyroid not palpable  Back: no tenderness  Respiratory: decreased breath sounds  Cardiovascular: S1S2 regular, no murmurs  Abdomen: soft, not tender, not distended, positive BS; liver and spleen WNL  Genitourinary: suprapubic tenderness  Lymphatic: no LN palpable  Musculoskeletal: no muscle tenderness, no joint swelling or tenderness  Extremities: no pedal edema  Left wrist - no further erythema, edema or tenderness  Neurological/ Psychiatric: moving all extremities  Skin: no rash; no palpable lesions    Labs: reviewed        133<L>  |  101  |  16  ----------------------------<  127<H>  3.7   |  24  |  0.92    Ca    8.6      30 Mar 2019 06:34  Phos  2.6         TPro  x   /  Alb  2.5<L>  /  TBili  x   /  DBili  x   /  AST  x   /  ALT  x   /  AlkPhos  x                           11.2   14.73 )-----------( 317      ( 28 Mar 2019 05:44 )             32.4         128<L>  |  95<L>  |  21  ----------------------------<  129<H>  3.3<L>   |  25  |  0.96    Ca    8.4<L>      29 Mar 2019 07:10  Phos  2.7         TPro  x   /  Alb  2.4<L>  /  TBili  x   /  DBili  x   /  AST  x   /  ALT  x   /  AlkPhos  x             Urinalysis Basic - ( 23 Mar 2019 06:45 )    Color: Yellow / Appearance: Clear / S.015 / pH: x  Gluc: x / Ketone: Negative  / Bili: Negative / Urobili: Negative mg/dL   Blood: x / Protein: 30 mg/dL / Nitrite: Negative   Leuk Esterase: Negative / RBC: Negative /HPF / WBC 0-2   Sq Epi: x / Non Sq Epi: Occasional / Bacteria: Occasional  Culture - Blood (19 @ 22:51)    Specimen Source: .Blood None    Culture Results:   No growth to date.    Culture - Blood (19 @ 18:49)    Specimen Source: .Blood None    Culture Results:   No growth to date.      Culture - Urine (19 @ 01:06)    Specimen Source: .Urine Clean Catch (Midstream)    Culture Results:   No growth    Radiology: all available radiological tests reviewed    EXAM:  CT BRAIN                            PROCEDURE DATE:  2019          INTERPRETATION:      CT head without IV contrast        CLINICAL INFORMATION:        Headache hypertension    TECHNIQUE: Contiguous axial 5 mm sections were obtained through the head.   Sagittal and coronal 2-D reformatted images were also obtained.   This   scan was performed using automatic exposure control (radiation dose   reduction software) to obtain a diagnostic image quality scan with   patient dose as low as reasonably achievable.     FINDINGS:   No previous examinations are available for review.    The brain demonstrates encephalomalacia and gliosis in the LEFT frontal   and temporal lobes, likely sequela of prior trauma. Mild anterior   periventricular and bifrontal some cortical white matter ischemia is   noted.   No acute cerebral cortical infarct is seen.  No intracranial   hemorrhage is found.  No mass effect is found in the brain.      The ventricles, sulci and basal cisterns appear unremarkable.         The orbits are unremarkable.  The paranasal sinuses are significant for   small retention cyst in the RIGHT sphenoid sinus.  The nasal cavity   appears intact.  The nasopharynx is symmetric.  The central skull base,   petrous temporal bones and calvarium remain intact.      IMPRESSION:   Mild encephalomalacia and gliosis in the LEFT frontal and   temporal lobes, likely sequela of prior trauma. Mild anterior   periventricular and bifrontal some cortical white matter ischemia is   noted.   Small retention cyst in RIGHT sphenoid sinus.      EXAM:  CT ABDOMEN AND PELVIS                            PROCEDURE DATE:  2019          INTERPRETATION:  HISTORY: Pain and difficulty urinating. Evaluate for   stone.    TECHNIQUE: CT scan of the abdomen and pelvis was performed from the domes  of the diaphragm to the symphysis pubis without oral or intravenous   contrast.  Coronal and sagittal reformatted images are provided.    COMPARISON: There is no prior study for comparison.    FINDINGS:   The lung bases are clear. The heart is not enlarged.    Evaluation of the parenchymal organs and vascular structures is limited   without intravenous contrast.     There is mild right hydroureteronephrosis secondary to a 3 mm stone near   the UVJ within the bladder. There is mild left hydroureteronephrosis   without obstructing ureteral calculus. There is no significant   perinephric stranding. The unenhanced appearance of the liver,   gallbladder, spleen, pancreas and adrenal glands is unremarkable. Small   left renal cyst is present.    Is no bowel obstruction, free air or ascites. The appendix is normal.   There is descending and sigmoid colon diverticulosis without evidence of   diverticulitis.    The abdominal aorta is normal in caliber. There is no retroperitoneal,   pelvic or inguinal adenopathy.     Mild the bladder is not thick-walled there is perivesicular fat stranding   suggesting inflammation. The prostate gland is enlarged.    There are no acute osseous abnormalities. There our degenerative changes   in the lumbar spine.    IMPRESSION:   Mild bilateral hydroureteronephrosis without significant perinephric   stranding. 3 mm bladder stone near the right UVJ,? Recently passed versus   chronic. Perivesicular fat stranding concerning for cystitis. Correlation   with urinalysis is recommended.    < from: CT Wrist No Cont, Left (19 @ 14:20) >    EXAM:  CT WRIST ONLY LT                          EXAM:  CT 3D RECONSTRUCT WO DIPIKA                            PROCEDURE DATE:  2019          INTERPRETATION:  HISTORY: Edematous wrist and forearm. History of injury   20 years ago.    Helical CT imaging of the left wrist was performed without intravenous   contrast. Sagittal and coronal reformats were provided. 3-D reformats   were performed on a separate workstation.    Findings:    There is a chronic posttraumatic deformity of the distal radius with   dorsal downsloping of the distal radial articular surface and osteophytic   ridging along the volar lip. There is pseudoarticulation between the   hypertrophic changes along the volar lip of the radius and the volar   proximal aspectof the lunate and scaphoid with associated subchondral   cystic change. There is osseous productive change noted along the dorsal   aspect of the scaphoid at the proximal pole likely related to   posttraumatic changes. Carpus appears slightly dorsally subluxed.    There is slight volar subluxation of the ulna relative to the radius with   moderate to severe distal radial ulnar joint arthrosis. There is mild   scaphotrapezial trapezoidal joint arthrosis.    There is prominent soft tissue edema noted about the dorsum of the   carpus. There is fullness about the second and sixth extensor   compartments at the level of the distal radius and ulnar styloid,   respectively. Findings are suspicious for underlying tenosynovitis. There   is joint fluidabout the dorsal aspect of the radiocarpal joint and the   region of the distal radioulnar joint.    Impression:    Chronic appearing deformity of the distal radius with dorsal downsloping   and degenerative pseudoarthrosis between the volar lip of the distal   radius and the volar aspect of the scaphoid and lunate. 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    Nonspecific soft tissue edema along the dorsum of the carpus. Findings   suspicious for tenosynovitis but the second and sixth extensor   compartment tendons. Evidence of radiocarpal and distal radioulnar joint   effusions.          Advanced directives addressed: full resuscitation

## 2019-03-30 NOTE — PROGRESS NOTE ADULT - ASSESSMENT
69 y o male with hx of HTN, presenting with left flank pains and suprapubic pains with possible retention and urinary   symptomts of UTI, noted to have bladder calcluli and bilat hydro     JULIANN - obstructive uropathy  resolved   peterson per Urology   obstructive diuresis - monitor   bilat hydro on Renal USS w retention - now has peterson - Ct abd hydro resolved   flomax po   monitor for obtructive diuresis   encourage po fluids     Hyoponatremia   - improved with IV saline    - monitor Na off IVF today     HTN - stable    - goal 's  to avoid overcorrection    - avoid ACE/ARB for now     Fevers /cellulitis of wrist    - abx per ID    - IV abx - vanco - monitor trough per ID    - avoid D5W in abx solutions        Thank you for the courtesy of this consult. We will follow this patient with you.   Management is subject to change if new information becomes available or patient condition changes.

## 2019-03-30 NOTE — PROGRESS NOTE ADULT - SUBJECTIVE AND OBJECTIVE BOX
The patient is a 70 yo male with Hx. of HTN, DM2, who presented in the ER for  dysuria, L flank pain , suprapubic pain for 3 days.  He was foung with  severe HTN  and possible urinary retention. Since admission has been urinating well.   Ct abd + mild Bilat hydro noted no peterson was palced     Today  pt feels well   no  discomfort  peterson draining well       PAST MEDICAL & SURGICAL HISTORY:  Hypertension, unspecified type    MEDICATIONS  (STANDING):  amLODIPine   Tablet 5 milliGRAM(s) Oral daily  dextrose 5%. 1000 milliLiter(s) (50 mL/Hr) IV Continuous <Continuous>  dextrose 50% Injectable 12.5 Gram(s) IV Push once  dextrose 50% Injectable 25 Gram(s) IV Push once  dextrose 50% Injectable 25 Gram(s) IV Push once  gabapentin 100 milliGRAM(s) Oral at bedtime  heparin  Injectable 5000 Unit(s) SubCutaneous every 12 hours  influenza   Vaccine 0.5 milliLiter(s) IntraMuscular once  insulin lispro (HumaLOG) corrective regimen sliding scale   SubCutaneous three times a day before meals  metoprolol succinate ER 50 milliGRAM(s) Oral daily  piperacillin/tazobactam IVPB. 3.375 Gram(s) IV Intermittent every 8 hours  sodium chloride 0.9%. 1000 milliLiter(s) (75 mL/Hr) IV Continuous <Continuous>  tamsulosin 0.4 milliGRAM(s) Oral at bedtime  vancomycin  IVPB 1000 milliGRAM(s) IV Intermittent every 12 hours    MEDICATIONS  (PRN):  acetaminophen   Tablet .. 650 milliGRAM(s) Oral every 6 hours PRN Temp greater or equal to 38C (100.4F), Mild Pain (1 - 3)  dextrose 40% Gel 15 Gram(s) Oral once PRN Blood Glucose LESS THAN 70 milliGRAM(s)/deciliter  glucagon  Injectable 1 milliGRAM(s) IntraMuscular once PRN Glucose LESS THAN 70 milligrams/deciliter  ibuprofen  Tablet. 400 milliGRAM(s) Oral every 12 hours PRN Temp greater or equal to 38.5C (101.3F)  oxyCODONE    IR 5 milliGRAM(s) Oral every 6 hours PRN Moderate Pain (4 - 6)        Allergies    No Known Allergies    Intolerances        SOCIAL HISTORY:  Denies ETOh,Smoking,     FAMILY HISTORY:  No pertinent family history in first degree relatives      REVIEW OF SYSTEMS:    CONSTITUTIONAL: No weakness, fevers or chills  EYES/ENT: No visual changes;  No vertigo or throat pain   NECK: No pain or stiffness  RESPIRATORY: No cough, wheezing, hemoptysis; No shortness of breath  CARDIOVASCULAR: No chest pain or palpitations  GASTROINTESTINAL: No abdominal or epigastric pain. No nausea, vomiting, or hematemesis; No diarrhea or constipation. No melena or hematochezia.  GENITOURINARY: No dysuria, frequency or hematuria  NEUROLOGICAL: No numbness or weakness  SKIN: No itching, burning, rashes, or lesions   All other review of systems is negative unless indicated above.    Vital Signs Last 24 Hrs  T(C): 36.9 (30 Mar 2019 12:03), Max: 36.9 (29 Mar 2019 20:26)  T(F): 98.4 (30 Mar 2019 12:03), Max: 98.5 (29 Mar 2019 20:26)  HR: 100 (30 Mar 2019 12:03) (100 - 101)  BP: 121/74 (30 Mar 2019 12:03) (121/74 - 147/90)  BP(mean): --  RR: 17 (30 Mar 2019 12:03) (17 - 17)  SpO2: 97% (30 Mar 2019 12:03) (95% - 97%)      I&O's Detail    29 Mar 2019 07:01  -  30 Mar 2019 07:00  --------------------------------------------------------  IN:    IV PiggyBack: 350 mL    sodium chloride 0.9%.: 1104 mL  Total IN: 1454 mL    OUT:    Indwelling Catheter - Urethral: 3000 mL    Voided: 450 mL  Total OUT: 3450 mL    Total NET: -1996 mL      30 Mar 2019 07:01  -  30 Mar 2019 16:20  --------------------------------------------------------  IN:    sodium chloride 0.9%.: 731 mL  Total IN: 731 mL    OUT:  Total OUT: 0 mL    Total NET: 731 mL        PHYSICAL EXAM:    Constitutional: NAD  HEENT:  EOMI,  MMM  Neck: No LAD, No JVD  Respiratory: CTAB  Cardiovascular: S1 and S2  Gastrointestinal: BS+, soft, NT/ND  Extremities: No peripheral edema, LUE wrist + swelling and tenderness   Neurological: A/O x 3, no focal deficits  Psychiatric: Normal mood, normal affect  : No Peterson  Skin: No rashes  Access: Not applicable    LABS:               133    |  101    |  16     ----------------------------<  127       30 Mar 2019 06:34  3.7     |  24     |  0.92     128    |  95     |  21     ----------------------------<  129       29 Mar 2019 07:10  3.3     |  25     |  0.96     131    |  95     |  16     ----------------------------<  136       28 Mar 2019 05:44  3.3     |  25     |  1.06     Ca    8.6        30 Mar 2019 06:34  Ca    8.4        29 Mar 2019 07:10    Phos  2.6       30 Mar 2019 06:34  Phos  2.7       29 Mar 2019 07:10    Osmolality, Serum: 279 mos/kg (19 @ 12:19)    Osmolality, Random Urine: 473 mos/kg (19 @ 12:00)    Uric Acid, Serum: 5.3 mg/dL (19 @ 07:10)      TPro  x      /  Alb  2.5    /  TBili  x      /        30 Mar 2019 06:34  DBili  x      /  AST  x      /  ALT  x      /  AlkPhos  x        TPro  x      /  Alb  2.4    /  TBili  x      /        29 Mar 2019 07:10  DBili  x      /  AST  x      /  ALT  x      /  AlkPhos  x          Phos  2.6       28 Mar 2019 05:44  Phos  3.3       27 Mar 2019 10:10      TPro  x      /  Alb  2.7    /  TBili  x      /        28 Mar 2019 05:44  DBili  x      /  AST  x      /  ALT  x      /  AlkPhos  x        TPro  x      /  Alb  3.0    /  TBili  x      /        27 Mar 2019 10:10  DBili  x      /  AST  x      /  ALT  x      /  AlkPhos  x          Urine Studies:  Urinalysis Basic - ( 25 Mar 2019 14:10 )    Color: Yellow / Appearance: Clear / S.010 / pH: x  Gluc: x / Ketone: Negative  / Bili: Negative / Urobili: Negative mg/dL   Blood: x / Protein: Negative mg/dL / Nitrite: Negative   Leuk Esterase: Trace / RBC: 3-5 /HPF / WBC 0-2   Sq Epi: x / Non Sq Epi: Negative / Bacteria: Occasional    Urine Microscopic-Add On (NC) (19 @ 14:10)    Red Blood Cell - Urine: 3-5 /HPF    White Blood Cell - Urine: 0-2    Bacteria: Occasional    Epithelial Cells: Negative    Protein/Creatinine Ratio Calculation: 0.3 Ratio ( @ 14:10)  Creatinine, Random Urine: 36.0 mg/dL ( @ 14:10)        RADIOLOGY & ADDITIONAL STUDIES:    EXAM:  US KIDNEYS AND BLADDER                            PROCEDURE DATE:  2019          INTERPRETATION:  US KIDNEYS AND BLADDER       HISTORY:  Renal Failure, acute kidney injury    COMPARISON: CT abdomen and pelvis 3/23/2019    PROCEDURE/TECHNIQUE: Multiple transverse and longitudinal sonographic   images of the bilateral kidneys, retroperitoneum and urinary bladder were   obtained.       KIDNEYS:  The RIGHT kidney measures 11.7 cm in length.  Mild hydronephrosis, no shadowing stone.  Preserved renal cortical thickness and echogenicity.      The LEFT kidney measures 11.6 cm in length.  Mild hydronephrosis, no shadowing stone.  Preserved renal cortical thickness and echogenicity.      URINARY BLADDER:        Stable small 9 mm calculusadjacent to the right ureterovesical junction.  Both ureteral jets were visualized using color Doppler.     Incomplete bladder emptying.    The aorta and inferior vena cava are normal in caliber.        IMPRESSION:    Stable mild bilateral hydronephrosis.    Stable 9 mm bladder calculus.    Incomplete bladder emptying.

## 2019-03-31 LAB
ALBUMIN SERPL ELPH-MCNC: 2.7 G/DL — LOW (ref 3.3–5)
ANION GAP SERPL CALC-SCNC: 8 MMOL/L — SIGNIFICANT CHANGE UP (ref 5–17)
BUN SERPL-MCNC: 17 MG/DL — SIGNIFICANT CHANGE UP (ref 7–23)
CALCIUM SERPL-MCNC: 9 MG/DL — SIGNIFICANT CHANGE UP (ref 8.5–10.1)
CHLORIDE SERPL-SCNC: 102 MMOL/L — SIGNIFICANT CHANGE UP (ref 96–108)
CO2 SERPL-SCNC: 24 MMOL/L — SIGNIFICANT CHANGE UP (ref 22–31)
CREAT SERPL-MCNC: 0.96 MG/DL — SIGNIFICANT CHANGE UP (ref 0.5–1.3)
GLUCOSE BLDC GLUCOMTR-MCNC: 107 MG/DL — HIGH (ref 70–99)
GLUCOSE BLDC GLUCOMTR-MCNC: 108 MG/DL — HIGH (ref 70–99)
GLUCOSE BLDC GLUCOMTR-MCNC: 118 MG/DL — HIGH (ref 70–99)
GLUCOSE BLDC GLUCOMTR-MCNC: 134 MG/DL — HIGH (ref 70–99)
GLUCOSE SERPL-MCNC: 103 MG/DL — HIGH (ref 70–99)
PHOSPHATE SERPL-MCNC: 2.9 MG/DL — SIGNIFICANT CHANGE UP (ref 2.5–4.5)
POTASSIUM SERPL-MCNC: 3.8 MMOL/L — SIGNIFICANT CHANGE UP (ref 3.5–5.3)
POTASSIUM SERPL-SCNC: 3.8 MMOL/L — SIGNIFICANT CHANGE UP (ref 3.5–5.3)
SODIUM SERPL-SCNC: 134 MMOL/L — LOW (ref 135–145)
VANCOMYCIN TROUGH SERPL-MCNC: 12.7 UG/ML — SIGNIFICANT CHANGE UP (ref 10–20)

## 2019-03-31 RX ADMIN — Medication 50 MILLIGRAM(S): at 05:21

## 2019-03-31 RX ADMIN — PIPERACILLIN AND TAZOBACTAM 25 GRAM(S): 4; .5 INJECTION, POWDER, LYOPHILIZED, FOR SOLUTION INTRAVENOUS at 06:29

## 2019-03-31 RX ADMIN — OXYCODONE HYDROCHLORIDE 5 MILLIGRAM(S): 5 TABLET ORAL at 20:18

## 2019-03-31 RX ADMIN — Medication 250 MILLIGRAM(S): at 17:05

## 2019-03-31 RX ADMIN — HEPARIN SODIUM 5000 UNIT(S): 5000 INJECTION INTRAVENOUS; SUBCUTANEOUS at 17:05

## 2019-03-31 RX ADMIN — OXYCODONE HYDROCHLORIDE 5 MILLIGRAM(S): 5 TABLET ORAL at 21:15

## 2019-03-31 RX ADMIN — AMLODIPINE BESYLATE 5 MILLIGRAM(S): 2.5 TABLET ORAL at 05:21

## 2019-03-31 RX ADMIN — GABAPENTIN 100 MILLIGRAM(S): 400 CAPSULE ORAL at 22:22

## 2019-03-31 RX ADMIN — PIPERACILLIN AND TAZOBACTAM 25 GRAM(S): 4; .5 INJECTION, POWDER, LYOPHILIZED, FOR SOLUTION INTRAVENOUS at 22:22

## 2019-03-31 RX ADMIN — TAMSULOSIN HYDROCHLORIDE 0.4 MILLIGRAM(S): 0.4 CAPSULE ORAL at 22:22

## 2019-03-31 RX ADMIN — PIPERACILLIN AND TAZOBACTAM 25 GRAM(S): 4; .5 INJECTION, POWDER, LYOPHILIZED, FOR SOLUTION INTRAVENOUS at 15:21

## 2019-03-31 RX ADMIN — Medication 250 MILLIGRAM(S): at 05:21

## 2019-03-31 RX ADMIN — HEPARIN SODIUM 5000 UNIT(S): 5000 INJECTION INTRAVENOUS; SUBCUTANEOUS at 05:21

## 2019-03-31 NOTE — PROGRESS NOTE ADULT - SUBJECTIVE AND OBJECTIVE BOX
CHIEF COMPLAINT/Diagnosis: left wrist cellulitis/ uti/ post obstructive uropathy    SUBJECTIVE: no complaints    REVIEW OF SYSTEMS:    CONSTITUTIONAL: No weakness, fevers or chills  EYES/ENT: No visual changes;  No vertigo or throat pain   NECK: No pain or stiffness  RESPIRATORY: No cough, wheezing, hemoptysis; No shortness of breath  CARDIOVASCULAR: No chest pain or palpitations  GASTROINTESTINAL: No abdominal or epigastric pain. No nausea, vomiting, or hematemesis; No diarrhea or constipation. No melena or hematochezia.  GENITOURINARY: No dysuria, frequency or hematuria  NEUROLOGICAL: No numbness or weakness  SKIN: No itching, burning, rashes, or lesions   All other review of systems is negative unless indicated above    Vital Signs Last 24 Hrs  T(C): 36.6 (31 Mar 2019 12:26), Max: 37.4 (30 Mar 2019 21:59)  T(F): 97.8 (31 Mar 2019 12:26), Max: 99.4 (30 Mar 2019 21:59)  HR: 89 (31 Mar 2019 12:26) (89 - 108)  BP: 139/91 (31 Mar 2019 12:26) (130/79 - 151/94)  BP(mean): --  RR: 17 (31 Mar 2019 12:26) (17 - 18)  SpO2: 97% (31 Mar 2019 12:26) (92% - 97%)    I&O's Summary    30 Mar 2019 07:01  -  31 Mar 2019 07:00  --------------------------------------------------------  IN: 731 mL / OUT: 3600 mL / NET: -2869 mL        CAPILLARY BLOOD GLUCOSE      POCT Blood Glucose.: 134 mg/dL (31 Mar 2019 11:26)  POCT Blood Glucose.: 107 mg/dL (31 Mar 2019 07:53)  POCT Blood Glucose.: 101 mg/dL (30 Mar 2019 21:51)  POCT Blood Glucose.: 140 mg/dL (30 Mar 2019 17:21)      PHYSICAL EXAM:    Constitutional: NAD, awake and alert, well-developed  HEENT: PERR, EOMI, Normal Hearing, MMM  Neck: Soft and supple, No LAD, No JVD  Respiratory: Breath sounds are clear bilaterally, No wheezing, rales or rhonchi  Cardiovascular: S1 and S2, regular rate and rhythm, no Murmurs, gallops or rubs  Gastrointestinal: Bowel Sounds present, soft, nontender, nondistended, no guarding, no rebound  Extremities: No peripheral edema  Vascular: 2+ peripheral pulses  Neurological: A/O x 3, no focal deficits  Musculoskeletal: 5/5 strength b/l upper and lower extremities  Skin: No rashes    MEDICATIONS:  MEDICATIONS  (STANDING):  amLODIPine   Tablet 5 milliGRAM(s) Oral daily  dextrose 5%. 1000 milliLiter(s) (50 mL/Hr) IV Continuous <Continuous>  dextrose 50% Injectable 12.5 Gram(s) IV Push once  dextrose 50% Injectable 25 Gram(s) IV Push once  dextrose 50% Injectable 25 Gram(s) IV Push once  gabapentin 100 milliGRAM(s) Oral at bedtime  heparin  Injectable 5000 Unit(s) SubCutaneous every 12 hours  influenza   Vaccine 0.5 milliLiter(s) IntraMuscular once  insulin lispro (HumaLOG) corrective regimen sliding scale   SubCutaneous three times a day before meals  metoprolol succinate ER 50 milliGRAM(s) Oral daily  piperacillin/tazobactam IVPB. 3.375 Gram(s) IV Intermittent every 8 hours  tamsulosin 0.4 milliGRAM(s) Oral at bedtime  vancomycin  IVPB 1000 milliGRAM(s) IV Intermittent every 12 hours      LABS: All Labs Reviewed:    03-31    134<L>  |  102  |  17  ----------------------------<  103<H>  3.8   |  24  |  0.96    Ca    9.0      31 Mar 2019 05:10  Phos  2.9     03-31    TPro  x   /  Alb  2.7<L>  /  TBili  x   /  DBili  x   /  AST  x   /  ALT  x   /  AlkPhos  x   03-31          Blood Culture: 03-26 @ 22:51  Organism --  Gram Stain Blood -- Gram Stain --  Specimen Source .Blood None  Culture-Blood --        Assessment:    70 yo male with Hx. of HTN, DM2, who presented in the ER for  dysuria, L flank pain , suprapubic pain for 3 days.  He was foung with  severe HTN in the ER     1-sepsis secondary to UTI w/ possible pyelo secondary to obstructive uropathy  -continued temp spikes >> possibly related to left wrist joint > read below  -no urine culture sent in ER  - infectious disease consult  -urine cultures and blood cultures sent  -change antibiotics from ceftiraxone  to Zosyn >   -ongoin temp spikes even on Zosyn >> will repeat CT abd/pelvis today rule out develping abcess or pyelo    2-Left wrist joint effusion and possible co-existing wrist cellulitis  -continued temp spikes despite being on abx for several days , suspicous for joint involvment. Left wrist  inflammed, hot, erthymatous  -orthopedic consult apprecaited; recco no draining of the fluid  -c/w abx as per infectious disease  -c/w zosyn and Vanco  -no temp spikes since initiating vanco      2-unconbtrolled htn  -increase Toprolol to 50mg  -c/w amloidipine and hydrochlorthizide  -now significantly improved    3-dvt prophy- sc heparin    4-acute renal failure secondary to urinary retention  -hussein BPH  -c/w flomax 0.4mg  -c/w peterson catheter  -Cr now improving  back to baseline with peterson catheter  -now s/p 6 days of oral flomax .> will do a trial of ray glaser with d/c of peterson cathter

## 2019-04-01 LAB
ALBUMIN SERPL ELPH-MCNC: 2.7 G/DL — LOW (ref 3.3–5)
ANION GAP SERPL CALC-SCNC: 11 MMOL/L — SIGNIFICANT CHANGE UP (ref 5–17)
BUN SERPL-MCNC: 15 MG/DL — SIGNIFICANT CHANGE UP (ref 7–23)
CALCIUM SERPL-MCNC: 8.9 MG/DL — SIGNIFICANT CHANGE UP (ref 8.5–10.1)
CHLORIDE SERPL-SCNC: 100 MMOL/L — SIGNIFICANT CHANGE UP (ref 96–108)
CO2 SERPL-SCNC: 24 MMOL/L — SIGNIFICANT CHANGE UP (ref 22–31)
CREAT SERPL-MCNC: 0.98 MG/DL — SIGNIFICANT CHANGE UP (ref 0.5–1.3)
CULTURE RESULTS: SIGNIFICANT CHANGE UP
CULTURE RESULTS: SIGNIFICANT CHANGE UP
GLUCOSE BLDC GLUCOMTR-MCNC: 113 MG/DL — HIGH (ref 70–99)
GLUCOSE BLDC GLUCOMTR-MCNC: 118 MG/DL — HIGH (ref 70–99)
GLUCOSE BLDC GLUCOMTR-MCNC: 121 MG/DL — HIGH (ref 70–99)
GLUCOSE BLDC GLUCOMTR-MCNC: 129 MG/DL — HIGH (ref 70–99)
GLUCOSE SERPL-MCNC: 113 MG/DL — HIGH (ref 70–99)
PHOSPHATE SERPL-MCNC: 3 MG/DL — SIGNIFICANT CHANGE UP (ref 2.5–4.5)
POTASSIUM SERPL-MCNC: 3.7 MMOL/L — SIGNIFICANT CHANGE UP (ref 3.5–5.3)
POTASSIUM SERPL-SCNC: 3.7 MMOL/L — SIGNIFICANT CHANGE UP (ref 3.5–5.3)
SODIUM SERPL-SCNC: 135 MMOL/L — SIGNIFICANT CHANGE UP (ref 135–145)
SPECIMEN SOURCE: SIGNIFICANT CHANGE UP
SPECIMEN SOURCE: SIGNIFICANT CHANGE UP

## 2019-04-01 RX ADMIN — Medication 250 MILLIGRAM(S): at 17:17

## 2019-04-01 RX ADMIN — INFLUENZA VIRUS VACCINE 0.5 MILLILITER(S): 15; 15; 15; 15 SUSPENSION INTRAMUSCULAR at 17:17

## 2019-04-01 RX ADMIN — PIPERACILLIN AND TAZOBACTAM 25 GRAM(S): 4; .5 INJECTION, POWDER, LYOPHILIZED, FOR SOLUTION INTRAVENOUS at 13:14

## 2019-04-01 RX ADMIN — PIPERACILLIN AND TAZOBACTAM 25 GRAM(S): 4; .5 INJECTION, POWDER, LYOPHILIZED, FOR SOLUTION INTRAVENOUS at 06:54

## 2019-04-01 RX ADMIN — OXYCODONE HYDROCHLORIDE 5 MILLIGRAM(S): 5 TABLET ORAL at 06:30

## 2019-04-01 RX ADMIN — HEPARIN SODIUM 5000 UNIT(S): 5000 INJECTION INTRAVENOUS; SUBCUTANEOUS at 05:54

## 2019-04-01 RX ADMIN — HEPARIN SODIUM 5000 UNIT(S): 5000 INJECTION INTRAVENOUS; SUBCUTANEOUS at 17:19

## 2019-04-01 RX ADMIN — GABAPENTIN 100 MILLIGRAM(S): 400 CAPSULE ORAL at 20:58

## 2019-04-01 RX ADMIN — Medication 50 MILLIGRAM(S): at 06:55

## 2019-04-01 RX ADMIN — TAMSULOSIN HYDROCHLORIDE 0.4 MILLIGRAM(S): 0.4 CAPSULE ORAL at 20:58

## 2019-04-01 RX ADMIN — AMLODIPINE BESYLATE 5 MILLIGRAM(S): 2.5 TABLET ORAL at 06:54

## 2019-04-01 RX ADMIN — OXYCODONE HYDROCHLORIDE 5 MILLIGRAM(S): 5 TABLET ORAL at 05:54

## 2019-04-01 RX ADMIN — PIPERACILLIN AND TAZOBACTAM 25 GRAM(S): 4; .5 INJECTION, POWDER, LYOPHILIZED, FOR SOLUTION INTRAVENOUS at 20:58

## 2019-04-01 RX ADMIN — Medication 250 MILLIGRAM(S): at 05:34

## 2019-04-01 RX ADMIN — Medication 650 MILLIGRAM(S): at 18:31

## 2019-04-01 NOTE — PROGRESS NOTE ADULT - ASSESSMENT
A/P:  68 yo M w/ L hand cellulitis:  -Pt on IV Abx, ID following, L hand cellulitis improving with abx  -will hold off on wrist aspiration, avoid bacterial seeding of joint of overlying cellulitis  -continue abx per ID  -medical management  -no acute orthopedic surgical intervention indicated at this time  -ortho stable  -consider L wrist aspiration if worsening swelling and worsening pain  -Dr. Altamirano aware and in agreement with above plan

## 2019-04-01 NOTE — PROGRESS NOTE ADULT - SUBJECTIVE AND OBJECTIVE BOX
Patient is a 69y Male with  who reports   no complaints overnight.    REVIEW OF SYSTEMS:    CONSTITUTIONAL: No weakness, fevers or chills  RESPIRATORY: No cough, wheezing, hemoptysis; No shortness of breath  CARDIOVASCULAR: No chest pain or palpitations  ABDOMEN: no abd pains, nausea or vomiting  GENITOURINARY: No dysuria, frequency or hematuria  All other review of systems is negative unless indicated above.    Allergies    No Known Allergies    Intolerances        MEDICATIONS  (STANDING):  amLODIPine   Tablet 5 milliGRAM(s) Oral daily  dextrose 5%. 1000 milliLiter(s) (50 mL/Hr) IV Continuous <Continuous>  dextrose 50% Injectable 12.5 Gram(s) IV Push once  dextrose 50% Injectable 25 Gram(s) IV Push once  dextrose 50% Injectable 25 Gram(s) IV Push once  gabapentin 100 milliGRAM(s) Oral at bedtime  heparin  Injectable 5000 Unit(s) SubCutaneous every 12 hours  influenza   Vaccine 0.5 milliLiter(s) IntraMuscular once  insulin lispro (HumaLOG) corrective regimen sliding scale   SubCutaneous three times a day before meals  metoprolol succinate ER 50 milliGRAM(s) Oral daily  piperacillin/tazobactam IVPB. 3.375 Gram(s) IV Intermittent every 8 hours  tamsulosin 0.4 milliGRAM(s) Oral at bedtime  vancomycin  IVPB 1000 milliGRAM(s) IV Intermittent every 12 hours    MEDICATIONS  (PRN):  acetaminophen   Tablet .. 650 milliGRAM(s) Oral every 6 hours PRN Temp greater or equal to 38C (100.4F), Mild Pain (1 - 3)  dextrose 40% Gel 15 Gram(s) Oral once PRN Blood Glucose LESS THAN 70 milliGRAM(s)/deciliter  glucagon  Injectable 1 milliGRAM(s) IntraMuscular once PRN Glucose LESS THAN 70 milligrams/deciliter  oxyCODONE    IR 5 milliGRAM(s) Oral every 6 hours PRN Moderate Pain (4 - 6)        Vital Signs Last 24 Hrs  T(C): 36.8 (01 Apr 2019 11:07), Max: 37.6 (31 Mar 2019 20:24)  T(F): 98.2 (01 Apr 2019 11:07), Max: 99.7 (31 Mar 2019 20:24)  HR: 92 (01 Apr 2019 11:07) (89 - 108)  BP: 112/73 (01 Apr 2019 11:07) (112/73 - 154/91)  BP(mean): --  RR: 17 (01 Apr 2019 11:07) (17 - 18)  SpO2: 99% (01 Apr 2019 11:07) (96% - 99%)    I&O's Summary    31 Mar 2019 07:01  -  01 Apr 2019 07:00  --------------------------------------------------------  IN: 0 mL / OUT: 2000 mL / NET: -2000 mL      PHYSICAL EXAM:    Constitutional: NAD,   HEENT:  EOMI,  MMM  Neck: No JVD  Respiratory: CTAB  Cardiovascular: S1 and S2  Gastrointestinal: BS+, soft, NT/ND  Extremities: LEFt wrist less swollen   Neurological: A/O x 3, no focal deficits  Psychiatric: Normal mood, normal affect  : No Garcia  Skin: No rashes  Dialysis Access: Not applicable    LABS:      135    |  100    |  15     ----------------------------<  113       01 Apr 2019 07:42  3.7     |  24     |  0.98     134    |  102    |  17     ----------------------------<  103       31 Mar 2019 05:10  3.8     |  24     |  0.96     133    |  101    |  16     ----------------------------<  127       30 Mar 2019 06:34  3.7     |  24     |  0.92     Ca    8.9        01 Apr 2019 07:42  Ca    9.0        31 Mar 2019 05:10    Phos  3.0       01 Apr 2019 07:42  Phos  2.9       31 Mar 2019 05:10      TPro  x      /  Alb  2.7    /  TBili  x      /        01 Apr 2019 07:42  DBili  x      /  AST  x      /  ALT  x      /  AlkPhos  x        TPro  x      /  Alb  2.7    /  TBili  x      /        31 Mar 2019 05:10  DBili  x      /  AST  x      /  ALT  x      /  AlkPhos  x            Serum Osmo: Osmolality, Serum: 279 mos/kg <L> [289 - 308] (03-29 @ 12:19)    Serum Uric Acid: Uric Acid, Serum: 5.3 mg/dL (03-29 @ 07:10)    Urine chemistry:   Urine Na: Sodium, Random Urine: 86 mmol/L (03-25 @ 14:10)  Urine Creatinine: Creatinine, Random Urine: 36.0 mg/dL (03-25 @ 14:10)    Urine Osm: Osmolality, Random Urine: 473 mos/kg (03-29 @ 12:00)      RADIOLOGY & ADDITIONAL STUDIES:

## 2019-04-01 NOTE — PROGRESS NOTE ADULT - SUBJECTIVE AND OBJECTIVE BOX
Date of service: 04-01-19 @ 11:21    Pt seen and examined  L wrist swelling decreasing slightly improved rom  Fevers down, feels better      ROS: no HA, no SOB or cough, no abdominal pain, no diarrhea or constipation; no dysuria, no legs pain, no new rashes      MEDICATIONS  (STANDING):  amLODIPine   Tablet 5 milliGRAM(s) Oral daily  dextrose 5%. 1000 milliLiter(s) (50 mL/Hr) IV Continuous <Continuous>  dextrose 50% Injectable 12.5 Gram(s) IV Push once  dextrose 50% Injectable 25 Gram(s) IV Push once  dextrose 50% Injectable 25 Gram(s) IV Push once  gabapentin 100 milliGRAM(s) Oral at bedtime  heparin  Injectable 5000 Unit(s) SubCutaneous every 12 hours  influenza   Vaccine 0.5 milliLiter(s) IntraMuscular once  insulin lispro (HumaLOG) corrective regimen sliding scale   SubCutaneous three times a day before meals  metoprolol succinate ER 50 milliGRAM(s) Oral daily  piperacillin/tazobactam IVPB. 3.375 Gram(s) IV Intermittent every 8 hours  tamsulosin 0.4 milliGRAM(s) Oral at bedtime  vancomycin  IVPB 1000 milliGRAM(s) IV Intermittent every 12 hours    Vital Signs Last 24 Hrs  T(C): 36.8 (01 Apr 2019 11:07), Max: 37.6 (31 Mar 2019 20:24)  T(F): 98.2 (01 Apr 2019 11:07), Max: 99.7 (31 Mar 2019 20:24)  HR: 92 (01 Apr 2019 11:07) (89 - 108)  BP: 112/73 (01 Apr 2019 11:07) (112/73 - 154/91)  BP(mean): --  RR: 17 (01 Apr 2019 11:07) (17 - 18)  SpO2: 99% (01 Apr 2019 11:07) (96% - 99%)      Physical Exam:  Constitutional: frail looking  HEENT: NC/AT, EOMI, PERRLA, conjunctivae clear  Neck: supple; thyroid not palpable  Back: no tenderness  Respiratory: decreased breath sounds  Cardiovascular: S1S2 regular, no murmurs  Abdomen: soft, not tender, not distended, positive BS; liver and spleen WNL  Genitourinary: suprapubic tenderness  Lymphatic: no LN palpable  Musculoskeletal: no muscle tenderness, no joint swelling or tenderness  Extremities: no pedal edema  Left wrist - no further erythema, edema or tenderness  Neurological/ Psychiatric: moving all extremities  Skin: no rash; no palpable lesions    Labs: reviewed      04-01    135  |  100  |  15  ----------------------------<  113<H>  3.7   |  24  |  0.98    Ca    8.9      01 Apr 2019 07:42  Phos  3.0     04-01    TPro  x   /  Alb  2.7<L>  /  TBili  x   /  DBili  x   /  AST  x   /  ALT  x   /  AlkPhos  x   04-01       Vancomycin Level, Trough: 12.7 ug/mL (03-31 @ 05:10)      Cultures:   Culture - Blood (03.26.19 @ 22:51)    Specimen Source: .Blood None    Culture Results:   No growth at 5 days.    Culture - Blood (03.26.19 @ 22:51)    Specimen Source: .Blood None    Culture Results:   No growth at 5 days.        Culture - Blood (03.24.19 @ 18:49)    Specimen Source: .Blood None    Culture Results:   No growth to date.      Culture - Urine (03.24.19 @ 01:06)    Specimen Source: .Urine Clean Catch (Midstream)    Culture Results:   No growth    Radiology: all available radiological tests reviewed    EXAM:  CT BRAIN                            PROCEDURE DATE:  03/23/2019          INTERPRETATION:      CT head without IV contrast        CLINICAL INFORMATION:        Headache hypertension    TECHNIQUE: Contiguous axial 5 mm sections were obtained through the head.   Sagittal and coronal 2-D reformatted images were also obtained.   This   scan was performed using automatic exposure control (radiation dose   reduction software) to obtain a diagnostic image quality scan with   patient dose as low as reasonably achievable.     FINDINGS:   No previous examinations are available for review.    The brain demonstrates encephalomalacia and gliosis in the LEFT frontal   and temporal lobes, likely sequela of prior trauma. Mild anterior   periventricular and bifrontal some cortical white matter ischemia is   noted.   No acute cerebral cortical infarct is seen.  No intracranial   hemorrhage is found.  No mass effect is found in the brain.      The ventricles, sulci and basal cisterns appear unremarkable.         The orbits are unremarkable.  The paranasal sinuses are significant for   small retention cyst in the RIGHT sphenoid sinus.  The nasal cavity   appears intact.  The nasopharynx is symmetric.  The central skull base,   petrous temporal bones and calvarium remain intact.      IMPRESSION:   Mild encephalomalacia and gliosis in the LEFT frontal and   temporal lobes, likely sequela of prior trauma. Mild anterior   periventricular and bifrontal some cortical white matter ischemia is   noted.   Small retention cyst in RIGHT sphenoid sinus.      EXAM:  CT ABDOMEN AND PELVIS                            PROCEDURE DATE:  03/23/2019          INTERPRETATION:  HISTORY: Pain and difficulty urinating. Evaluate for   stone.    TECHNIQUE: CT scan of the abdomen and pelvis was performed from the domes  of the diaphragm to the symphysis pubis without oral or intravenous   contrast.  Coronal and sagittal reformatted images are provided.    COMPARISON: There is no prior study for comparison.    FINDINGS:   The lung bases are clear. The heart is not enlarged.    Evaluation of the parenchymal organs and vascular structures is limited   without intravenous contrast.     There is mild right hydroureteronephrosis secondary to a 3 mm stone near   the UVJ within the bladder. There is mild left hydroureteronephrosis   without obstructing ureteral calculus. There is no significant   perinephric stranding. The unenhanced appearance of the liver,   gallbladder, spleen, pancreas and adrenal glands is unremarkable. Small   left renal cyst is present.    Is no bowel obstruction, free air or ascites. The appendix is normal.   There is descending and sigmoid colon diverticulosis without evidence of   diverticulitis.    The abdominal aorta is normal in caliber. There is no retroperitoneal,   pelvic or inguinal adenopathy.     Mild the bladder is not thick-walled there is perivesicular fat stranding   suggesting inflammation. The prostate gland is enlarged.    There are no acute osseous abnormalities. There our degenerative changes   in the lumbar spine.    IMPRESSION:   Mild bilateral hydroureteronephrosis without significant perinephric   stranding. 3 mm bladder stone near the right UVJ,? Recently passed versus   chronic. Perivesicular fat stranding concerning for cystitis. Correlation   with urinalysis is recommended.    < from: CT Wrist No Cont, Left (03.28.19 @ 14:20) >    EXAM:  CT WRIST ONLY LT                          EXAM:  CT 3D RECONSTRUCT NHAN BAR                            PROCEDURE DATE:  03/28/2019          INTERPRETATION:  HISTORY: Edematous wrist and forearm. History of injury   20 years ago.    Helical CT imaging of the left wrist was performed without intravenous   contrast. Sagittal and coronal reformats were provided. 3-D reformats   were performed on a separate workstation.    Findings:    There is a chronic posttraumatic deformity of the distal radius with   dorsal downsloping of the distal radial articular surface and osteophytic   ridging along the volar lip. There is pseudoarticulation between the   hypertrophic changes along the volar lip of the radius and the volar   proximal aspectof the lunate and scaphoid with associated subchondral   cystic change. There is osseous productive change noted along the dorsal   aspect of the scaphoid at the proximal pole likely related to   posttraumatic changes. Carpus appears slightly dorsally subluxed.    There is slight volar subluxation of the ulna relative to the radius with   moderate to severe distal radial ulnar joint arthrosis. There is mild   scaphotrapezial trapezoidal joint arthrosis.    There is prominent soft tissue edema noted about the dorsum of the   carpus. There is fullness about the second and sixth extensor   compartments at the level of the distal radius and ulnar styloid,   respectively. Findings are suspicious for underlying tenosynovitis. There   is joint fluidabout the dorsal aspect of the radiocarpal joint and the   region of the distal radioulnar joint.    Impression:    Chronic appearing deformity of the distal radius with dorsal downsloping   and degenerative pseudoarthrosis between the volar lip of the distal   radius and the volar aspect of the scaphoid and lunate. 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    Nonspecific soft tissue edema along the dorsum of the carpus. Findings   suspicious for tenosynovitis but the second and sixth extensor   compartment tendons. Evidence of radiocarpal and distal radioulnar joint   effusions.          Advanced directives addressed: full resuscitation

## 2019-04-01 NOTE — PROGRESS NOTE ADULT - ASSESSMENT
Chronic Lt wrist DJD and deformity with resolving cellulitis.    Paln:  Continue IV ABX as per ID  No ortho intervention at this time, will continue observation  Will consider aspiration Lt wrist joint if symptom will continue or worsening and after resolved cellulitis.

## 2019-04-01 NOTE — PROGRESS NOTE ADULT - SUBJECTIVE AND OBJECTIVE BOX
CHIEF COMPLAINT/Diagnosis: BPH/ urinary retention/ UTI/ left wrist cellulitis    SUBJECTIVE: no complaints    REVIEW OF SYSTEMS:    CONSTITUTIONAL: No weakness, fevers or chills  EYES/ENT: No visual changes;  No vertigo or throat pain   NECK: No pain or stiffness  RESPIRATORY: No cough, wheezing, hemoptysis; No shortness of breath  CARDIOVASCULAR: No chest pain or palpitations  GASTROINTESTINAL: No abdominal or epigastric pain. No nausea, vomiting, or hematemesis; No diarrhea or constipation. No melena or hematochezia.  GENITOURINARY: No dysuria, frequency or hematuria  NEUROLOGICAL: No numbness or weakness  SKIN: No itching, burning, rashes, or lesions   All other review of systems is negative unless indicated above    Vital Signs Last 24 Hrs  T(C): 36.8 (01 Apr 2019 11:07), Max: 37.6 (31 Mar 2019 20:24)  T(F): 98.2 (01 Apr 2019 11:07), Max: 99.7 (31 Mar 2019 20:24)  HR: 92 (01 Apr 2019 11:07) (92 - 108)  BP: 112/73 (01 Apr 2019 11:07) (112/73 - 154/91)  BP(mean): --  RR: 17 (01 Apr 2019 11:07) (17 - 18)  SpO2: 99% (01 Apr 2019 11:07) (96% - 99%)    I&O's Summary    31 Mar 2019 07:01  -  01 Apr 2019 07:00  --------------------------------------------------------  IN: 0 mL / OUT: 2000 mL / NET: -2000 mL    01 Apr 2019 07:01  -  01 Apr 2019 17:08  --------------------------------------------------------  IN: 100 mL / OUT: 125 mL / NET: -25 mL        CAPILLARY BLOOD GLUCOSE      POCT Blood Glucose.: 118 mg/dL (01 Apr 2019 16:48)  POCT Blood Glucose.: 113 mg/dL (01 Apr 2019 11:54)  POCT Blood Glucose.: 129 mg/dL (01 Apr 2019 07:14)  POCT Blood Glucose.: 108 mg/dL (31 Mar 2019 22:03)      PHYSICAL EXAM:    Constitutional: NAD, awake and alert, well-developed  HEENT: PERR, EOMI, Normal Hearing, MMM  Neck: Soft and supple, No LAD, No JVD  Respiratory: Breath sounds are clear bilaterally, No wheezing, rales or rhonchi  Cardiovascular: S1 and S2, regular rate and rhythm, no Murmurs, gallops or rubs  Gastrointestinal: Bowel Sounds present, soft, nontender, nondistended, no guarding, no rebound  Extremities: No peripheral edema  Vascular: 2+ peripheral pulses  Neurological: A/O x 3, no focal deficits  Musculoskeletal: 5/5 strength b/l upper and lower extremities  Skin: No rashes    MEDICATIONS:  MEDICATIONS  (STANDING):  amLODIPine   Tablet 5 milliGRAM(s) Oral daily  dextrose 5%. 1000 milliLiter(s) (50 mL/Hr) IV Continuous <Continuous>  dextrose 50% Injectable 12.5 Gram(s) IV Push once  dextrose 50% Injectable 25 Gram(s) IV Push once  dextrose 50% Injectable 25 Gram(s) IV Push once  gabapentin 100 milliGRAM(s) Oral at bedtime  heparin  Injectable 5000 Unit(s) SubCutaneous every 12 hours  influenza   Vaccine 0.5 milliLiter(s) IntraMuscular once  insulin lispro (HumaLOG) corrective regimen sliding scale   SubCutaneous three times a day before meals  metoprolol succinate ER 50 milliGRAM(s) Oral daily  piperacillin/tazobactam IVPB. 3.375 Gram(s) IV Intermittent every 8 hours  tamsulosin 0.4 milliGRAM(s) Oral at bedtime  vancomycin  IVPB 1000 milliGRAM(s) IV Intermittent every 12 hours      LABS: All Labs Reviewed:    04-01    135  |  100  |  15  ----------------------------<  113<H>  3.7   |  24  |  0.98    Ca    8.9      01 Apr 2019 07:42  Phos  3.0     04-01    TPro  x   /  Alb  2.7<L>  /  TBili  x   /  DBili  x   /  AST  x   /  ALT  x   /  AlkPhos  x   04-01          Assessment:    68 yo male with Hx. of HTN, DM2, who presented in the ER for  dysuria, L flank pain , suprapubic pain for 3 days.  He was foung with  severe HTN in the ER     1-sepsis secondary to UTI w/ possible pyelo secondary to obstructive uropathy  -continued temp spikes >> possibly related to left wrist joint > read below  -no urine culture sent in ER  - infectious disease consult  -urine cultures and blood cultures sent  -change antibiotics from ceftiraxone  to Zosyn  secondary to ongoing spikes >> which have now resolved after change of abx to zosyn/vanco    2-Left wrist joint effusion and possible co-existing wrist cellulitis  -continued temp spikes despite being on abx for several days , suspicous for joint involvment. Left wrist  inflammed, hot, erthymatous  -orthopedic consult apprecaited; recco no draining of the fluid  -c/w abx as per infectious disease  -c/w zosyn and Vanco  -no temp spikes since initiating vanco      2-unconbtrolled htn  -increase Toprolol to 50mg  -c/w amloidipine and hydrochlorthizide  -now significantly improved    3-dvt prophy- sc heparin    4-acute renal failure secondary to urinary retention  -hussein BPH  -c/w flomax 0.4mg  -c/w peterson catheter  -Cr now improving  back to baseline with peterson catheter  -now s/p 6 days of oral flomax  >> trial of void today . Peterson catheter removed, patient voiding a bit, but not fully. Nursing to insert peterson catheter again if retaining greater then 600.       Plan: can hussein be dishcarged tommarrow. If unable to void on his own, hussein will need addition of finesteride and discharge with peterson catheter. If able to void overnight, dc on flomax without catheter. will need oral abx for uti/ hand cellulitis

## 2019-04-01 NOTE — PROGRESS NOTE ADULT - ASSESSMENT
69 y o male with hx of HTN, presenting with left flank pains and suprapubic pains with possible retention and urinary   symptomts of UTI, noted to have bladder calcluli and bilat hydro     JULIANN - obstructive uropathy  resolved   peterson per Urology   obstructive diuresis - monitor   flomax po     Hyoponatremia  - improved with IV saline    - monitor Na     HTN - stable     Fevers /cellulitis of wrist    - abx per ID    - IV abx - vanco - monitor trough per ID    - avoid D5W in abx solutions     no further renal intervention - will sign off , recall as needed     Thank you for the courtesy of this consult. We will follow this patient with you.   Management is subject to change if new information becomes available or patient condition changes.

## 2019-04-01 NOTE — PROGRESS NOTE ADULT - ASSESSMENT
70 yo male with Hx. of HTN, DM2, who presented in the ER for dysuria, L flank pain, suprapubic pain for 3 days.  He was found with  severe HTN in the ER and referred for telemetry admission. also found with wbc ct 11-12, UA unremarkable CT abd/pelvis with perivesicular fat stranding, cystitis, b/l hydroureteronephrosis, 3 mm bladder stone in R UVJ ? passed stone was given IV rocephin for UTI.     1. Febrile syndrome resolving. L wrist-forearm probable cellulitis. cystitis. bladder stone. urinary retention  - fever is down, slowly improving   - ortho evaluation appreciated: no clinical suspicion of septic wrist, current clinical picture most suggestive of a left hand cellulitis  - on vancomycin IV # 5 and zosyn IV # 6 vancomycin cough wnl  - tolerating abx well so far; no side effects noted  - urine cx/blood cx no growth   - continue abx coverage  - monitor temps  - f/u cbc    2. other issues - HTN/DM - care per medicine

## 2019-04-01 NOTE — PROGRESS NOTE ADULT - SUBJECTIVE AND OBJECTIVE BOX
Pt seen and examined. Pt states his pain is improving and he is doing much better.     Vital Signs Last 24 Hrs  T(C): 37.3 (01 Apr 2019 05:49), Max: 37.6 (31 Mar 2019 20:24)  T(F): 99.2 (01 Apr 2019 05:49), Max: 99.7 (31 Mar 2019 20:24)  HR: 108 (01 Apr 2019 05:49) (89 - 108)  BP: 136/86 (01 Apr 2019 05:49) (136/86 - 154/91)  BP(mean): --  RR: 18 (01 Apr 2019 05:49) (17 - 18)  SpO2: 98% (01 Apr 2019 05:49) (96% - 98%)    PE:  Gen: NAD  LUE:  Skin intact, mild to moderate swelling dorsum of hand  Erythema improved  Compartments soft and compressible  Chronic deformity of left wrist, protuberance noted dorsally  Can passively range wrist without pain  Pt has limited AROM  +AIN/PIN/M/U/R/Ax  SILT C5-T2  2+ DP

## 2019-04-01 NOTE — PROGRESS NOTE ADULT - NSHPATTENDINGPLANDISCUSS_GEN_ALL_CORE
patient , nursing, staff
patient, nursing, staff
hiral, nursing, staff

## 2019-04-01 NOTE — PROGRESS NOTE ADULT - SUBJECTIVE AND OBJECTIVE BOX
Patient seen and examined.  Reported decrease pain since yesterday.    PE:  + chronic deformity and decraese ROM Lt wrist  + mild TTP LT wrist  + edema Lt wrist  + mild erythema Lt wrist  NVI LUE  Compartments soft LUE

## 2019-04-02 ENCOUNTER — TRANSCRIPTION ENCOUNTER (OUTPATIENT)
Age: 69
End: 2019-04-02

## 2019-04-02 VITALS
OXYGEN SATURATION: 100 % | TEMPERATURE: 98 F | RESPIRATION RATE: 17 BRPM | HEART RATE: 92 BPM | SYSTOLIC BLOOD PRESSURE: 135 MMHG | DIASTOLIC BLOOD PRESSURE: 73 MMHG

## 2019-04-02 LAB
ALBUMIN SERPL ELPH-MCNC: 2.8 G/DL — LOW (ref 3.3–5)
ALP SERPL-CCNC: 53 U/L — SIGNIFICANT CHANGE UP (ref 40–120)
ALT FLD-CCNC: 25 U/L — SIGNIFICANT CHANGE UP (ref 12–78)
ANION GAP SERPL CALC-SCNC: 10 MMOL/L — SIGNIFICANT CHANGE UP (ref 5–17)
AST SERPL-CCNC: 16 U/L — SIGNIFICANT CHANGE UP (ref 15–37)
BILIRUB SERPL-MCNC: 0.5 MG/DL — SIGNIFICANT CHANGE UP (ref 0.2–1.2)
BUN SERPL-MCNC: 21 MG/DL — SIGNIFICANT CHANGE UP (ref 7–23)
CALCIUM SERPL-MCNC: 8.8 MG/DL — SIGNIFICANT CHANGE UP (ref 8.5–10.1)
CHLORIDE SERPL-SCNC: 101 MMOL/L — SIGNIFICANT CHANGE UP (ref 96–108)
CO2 SERPL-SCNC: 25 MMOL/L — SIGNIFICANT CHANGE UP (ref 22–31)
CREAT SERPL-MCNC: 1.08 MG/DL — SIGNIFICANT CHANGE UP (ref 0.5–1.3)
GLUCOSE BLDC GLUCOMTR-MCNC: 105 MG/DL — HIGH (ref 70–99)
GLUCOSE BLDC GLUCOMTR-MCNC: 110 MG/DL — HIGH (ref 70–99)
GLUCOSE SERPL-MCNC: 124 MG/DL — HIGH (ref 70–99)
HCT VFR BLD CALC: 33.2 % — LOW (ref 39–50)
HGB BLD-MCNC: 11.3 G/DL — LOW (ref 13–17)
MCHC RBC-ENTMCNC: 29.7 PG — SIGNIFICANT CHANGE UP (ref 27–34)
MCHC RBC-ENTMCNC: 34 GM/DL — SIGNIFICANT CHANGE UP (ref 32–36)
MCV RBC AUTO: 87.4 FL — SIGNIFICANT CHANGE UP (ref 80–100)
NRBC # BLD: 0 /100 WBCS — SIGNIFICANT CHANGE UP (ref 0–0)
PLATELET # BLD AUTO: 524 K/UL — HIGH (ref 150–400)
POTASSIUM SERPL-MCNC: 3.7 MMOL/L — SIGNIFICANT CHANGE UP (ref 3.5–5.3)
POTASSIUM SERPL-SCNC: 3.7 MMOL/L — SIGNIFICANT CHANGE UP (ref 3.5–5.3)
PROT SERPL-MCNC: 7.5 GM/DL — SIGNIFICANT CHANGE UP (ref 6–8.3)
RBC # BLD: 3.8 M/UL — LOW (ref 4.2–5.8)
RBC # FLD: 13.2 % — SIGNIFICANT CHANGE UP (ref 10.3–14.5)
SODIUM SERPL-SCNC: 136 MMOL/L — SIGNIFICANT CHANGE UP (ref 135–145)
WBC # BLD: 11.67 K/UL — HIGH (ref 3.8–10.5)
WBC # FLD AUTO: 11.67 K/UL — HIGH (ref 3.8–10.5)

## 2019-04-02 RX ORDER — LISINOPRIL 2.5 MG/1
1 TABLET ORAL
Qty: 0 | Refills: 0 | COMMUNITY

## 2019-04-02 RX ORDER — METOPROLOL TARTRATE 50 MG
1 TABLET ORAL
Qty: 60 | Refills: 0 | OUTPATIENT
Start: 2019-04-02 | End: 2019-05-01

## 2019-04-02 RX ORDER — TAMSULOSIN HYDROCHLORIDE 0.4 MG/1
1 CAPSULE ORAL
Qty: 14 | Refills: 0 | OUTPATIENT
Start: 2019-04-02 | End: 2019-04-15

## 2019-04-02 RX ORDER — ATORVASTATIN CALCIUM 80 MG/1
1 TABLET, FILM COATED ORAL
Qty: 0 | Refills: 0 | COMMUNITY

## 2019-04-02 RX ORDER — AMLODIPINE BESYLATE 2.5 MG/1
1 TABLET ORAL
Qty: 30 | Refills: 0 | OUTPATIENT
Start: 2019-04-02 | End: 2019-05-01

## 2019-04-02 RX ORDER — ATORVASTATIN CALCIUM 80 MG/1
1 TABLET, FILM COATED ORAL
Qty: 30 | Refills: 0 | OUTPATIENT
Start: 2019-04-02 | End: 2019-05-01

## 2019-04-02 RX ORDER — METOPROLOL TARTRATE 50 MG
1 TABLET ORAL
Qty: 60 | Refills: 0 | OUTPATIENT
Start: 2019-04-02

## 2019-04-02 RX ORDER — TAMSULOSIN HYDROCHLORIDE 0.4 MG/1
1 CAPSULE ORAL
Qty: 14 | Refills: 0 | OUTPATIENT
Start: 2019-04-02

## 2019-04-02 RX ORDER — AMLODIPINE BESYLATE 2.5 MG/1
1 TABLET ORAL
Qty: 0 | Refills: 0 | COMMUNITY

## 2019-04-02 RX ORDER — FINASTERIDE 5 MG/1
1 TABLET, FILM COATED ORAL
Qty: 14 | Refills: 0 | OUTPATIENT
Start: 2019-04-02 | End: 2019-04-15

## 2019-04-02 RX ADMIN — Medication 250 MILLIGRAM(S): at 05:08

## 2019-04-02 RX ADMIN — AMLODIPINE BESYLATE 5 MILLIGRAM(S): 2.5 TABLET ORAL at 05:08

## 2019-04-02 RX ADMIN — PIPERACILLIN AND TAZOBACTAM 25 GRAM(S): 4; .5 INJECTION, POWDER, LYOPHILIZED, FOR SOLUTION INTRAVENOUS at 07:17

## 2019-04-02 RX ADMIN — Medication 1 TABLET(S): at 17:45

## 2019-04-02 RX ADMIN — Medication 50 MILLIGRAM(S): at 05:08

## 2019-04-02 RX ADMIN — HEPARIN SODIUM 5000 UNIT(S): 5000 INJECTION INTRAVENOUS; SUBCUTANEOUS at 05:09

## 2019-04-02 NOTE — DISCHARGE NOTE PROVIDER - CARE PROVIDERS DIRECT ADDRESSES
[email or web address]

## 2019-04-02 NOTE — PHARMACOTHERAPY INTERVENTION NOTE - INTERVENTION CATEGORIES
ASP Post Operative Visit Note       Active Problems  1. Pilonidal cyst         Chief Complaint   Patient presents with:  Pilonidal Cyst: 1st po pilonidal cystectomy 6/2/2017 with PBP. Pt presents with bleeding, denies any discharge. No NV fever or chills.  C/O hours as needed for Pain. Disp: 475 mL Rfl: 0   acetaminophen 500 MG Oral Tab Take 500 mg by mouth every 6 (six) hours as needed for Pain.  Disp:  Rfl:    Soft Lens Products (SALINE) 0.9 % Does not apply Solution Moisten gauze and place into wound two times Neck: Normal range of motion. Pulmonary/Chest: Effort normal and breath sounds normal. No respiratory distress. Abdominal: Soft. Bowel sounds are normal. He exhibits no distension. There is no tenderness.    Neurological: He is alert and oriented to p 6/15/2017).     Frances Bishop

## 2019-04-02 NOTE — PROGRESS NOTE ADULT - ASSESSMENT
68 yo male with Hx. of HTN, DM2, who presented in the ER for dysuria, L flank pain, suprapubic pain for 3 days.  He was found with  severe HTN in the ER and referred for telemetry admission. also found with wbc ct 11-12, UA unremarkable CT abd/pelvis with perivesicular fat stranding, cystitis, b/l hydroureteronephrosis, 3 mm bladder stone in R UVJ ? passed stone was given IV rocephin for UTI.     1. Febrile syndrome resolving. L wrist-forearm probable cellulitis. cystitis. bladder stone. urinary retention  - fever is down, slowly improving  - ortho evaluation appreciated: no clinical suspicion of septic wrist, current clinical picture most suggestive of a left hand cellulitis  - s/p vancomycin IV # 5 and zosyn IV # 6 vancomycin cough wnl  - tolerating abx well so far; no side effects noted  - urine cx/blood cx no growth   - continue abx coverage  - dc with oral bactrim 1 DS BID 5-7 days   - monitor temps  - f/u cbc    2. other issues - HTN/DM - care per medicine

## 2019-04-02 NOTE — DISCHARGE NOTE PROVIDER - NSDCCPCAREPLAN_GEN_ALL_CORE_FT
PRINCIPAL DISCHARGE DIAGNOSIS  Diagnosis: Hypertension, unspecified type  Assessment and Plan of Treatment:       SECONDARY DISCHARGE DIAGNOSES  Diagnosis: Renal colic  Assessment and Plan of Treatment:

## 2019-04-02 NOTE — PROGRESS NOTE ADULT - SUBJECTIVE AND OBJECTIVE BOX
Pt S/E at bedside, no acute events overnight, pain controlled. No complaints this morning.     Vital Signs Last 24 Hrs  T(C): 36.9 (02 Apr 2019 04:49), Max: 36.9 (02 Apr 2019 04:49)  T(F): 98.4 (02 Apr 2019 04:49), Max: 98.4 (02 Apr 2019 04:49)  HR: 105 (02 Apr 2019 04:49) (90 - 105)  BP: 123/78 (02 Apr 2019 04:49) (112/73 - 140/88)  BP(mean): --  RR: 17 (02 Apr 2019 04:49) (17 - 18)  SpO2: 100% (02 Apr 2019 04:49) (98% - 100%)    Gen: NAD, AAOx3    Left Upper Extremity:  Skin intact, erythema improving around wrist  Chronic L wrist deformity  +edema over wrist  mild ttp over wrist  +AIN/PIN/M/R/U/Msc/Ax  SILT C5-T1  +Radial Pulse  Compartments soft  No calf TTP B/L

## 2019-04-02 NOTE — DISCHARGE NOTE PROVIDER - CARE PROVIDER_API CALL
Co, Nikolay LUCIO)  Internal Medicine  [address] [place] [address]  Phone: [phone]  Fax: [phone]  Follow Up Time:     Chris Pemberton)  Urology  [address] [facility], [address] [place] [address]  Phone: [phone]  Fax: [phone]  Follow Up Time: 1 week

## 2019-04-02 NOTE — PROGRESS NOTE ADULT - REASON FOR ADMISSION
HTN, urine retention, hydronephrosis, bladder stone,

## 2019-04-02 NOTE — PROGRESS NOTE ADULT - ASSESSMENT
69M with chronic L wrist deformity and improving cellulitis  Pain control  WBAT LUE  Elevate LUE  Cont IV abx per ID  No planned orthopedic intervention at this time  Cont care per primary team  Will discuss with attending

## 2019-04-02 NOTE — DISCHARGE NOTE NURSING/CASE MANAGEMENT/SOCIAL WORK - NSDCPNINST_GEN_ALL_CORE
patient to follow up at [facility] regarding Peterson Catheter./voiding trial. Educational material given to patient and family regarding peterson care, printed in Chinese. Homecare to follow up outpt.

## 2019-04-02 NOTE — DISCHARGE NOTE PROVIDER - HOSPITAL COURSE
70 yo male with Hx. of HTN, DM2, who presented in the ER for  dysuria, L flank pain , suprapubic pain for 3 days.  He was found with  severe HTN in the ER         #sepsis secondary to UTI w/ possible pyelo secondary to obstructive uropathy    -continued temp spikes >> possibly related to left wrist joint > read below    -no urine culture sent in ER    - infectious disease consult    -urine cultures and blood cultures sent    - plan to dc on bactrim for 7 more days        #Left wrist joint effusion and possible co-existing wrist cellulitis    -continued temp spikes despite being on abx for several days , suspicious for joint involvement. Left wrist  inflammation improving    -orthopedic and ID eval noted will dc on bactrim for 7 more days        #unconbtrolled htn    - will change to metoprolol tartrate on dc so patient can use [employer] formulary     -c/w amloidipine     -now significantly improved        # acute renal failure secondary to urinary retention    -hussein BPH    -c/w flomax 0.4mg and add finesteride     -c/w peterson catheter with void trial next week at dr mota office and [facility] follow up next week     -Cr now improving  back to baseline with peterson catheter    -now s/p 6 days of oral flomax  -> failed void trial 4/1.          total time spent on dc 40 mins 68 yo male with Hx. of HTN, DM2, who presented in the ER for  dysuria, L flank pain , suprapubic pain for 3 days.  He was found with  severe HTN in the ER         #sepsis secondary to UTI w/ possible pyelo secondary to obstructive uropathy    -continued temp spikes >> possibly related to left wrist joint > read below    -no urine culture sent in ER    - infectious disease consult    -urine cultures and blood cultures sent    - plan to dc on bactrim for 7 more days        #Left wrist joint effusion and possible co-existing wrist cellulitis    -continued temp spikes despite being on abx for several days , suspicious for joint involvement. Left wrist  inflammation improving    -orthopedic and ID eval noted will dc on bactrim for 7 more days        #unconbtrolled htn    - will change to metoprolol tartrate on dc so patient can use [employer] formulary     -c/w amloidipine     -now significantly improved        # acute renal failure secondary to urinary retention    -hussein BPH    -c/w flomax 0.4mg and add finesteride     -c/w peterson catheter with void trial next week at dr mota office and [facility] follow up next week     -Cr now improving  back to baseline with peterson catheter    -now s/p 6 days of oral flomax  -> failed void trial 4/1.      - [facility] to assist with peterson as per CM    - all of this d/w pt via  with family at bedside and need to clsoe f/u stressed with isntruction printed in Belarusian         total time spent on dc 40 mins

## 2019-04-02 NOTE — DISCHARGE NOTE NURSING/CASE MANAGEMENT/SOCIAL WORK - NSDCDPATPORTLINK_GEN_ALL_CORE
You can access the [facility] Patient Portal, offered by [facility], by registering with the following website: [email or web address]

## 2019-04-02 NOTE — PROGRESS NOTE ADULT - PROVIDER SPECIALTY LIST ADULT
Hospitalist
Infectious Disease
Nephrology
Orthopedics
Infectious Disease

## 2019-04-02 NOTE — PROGRESS NOTE ADULT - SUBJECTIVE AND OBJECTIVE BOX
Date of service: 04-02-19 @ 10:10    Pt seen and examined  L wrist improving, swelling has decreased significantly  Fevers down, feels better    ROS: no HA, no SOB or cough, no abdominal pain, no diarrhea or constipation; no dysuria, no legs pain, no new rashes      MEDICATIONS  (STANDING):  amLODIPine   Tablet 5 milliGRAM(s) Oral daily  dextrose 5%. 1000 milliLiter(s) (50 mL/Hr) IV Continuous <Continuous>  dextrose 50% Injectable 12.5 Gram(s) IV Push once  dextrose 50% Injectable 25 Gram(s) IV Push once  dextrose 50% Injectable 25 Gram(s) IV Push once  doxycycline hyclate Capsule 100 milliGRAM(s) Oral every 12 hours  gabapentin 100 milliGRAM(s) Oral at bedtime  heparin  Injectable 5000 Unit(s) SubCutaneous every 12 hours  insulin lispro (HumaLOG) corrective regimen sliding scale   SubCutaneous three times a day before meals  metoprolol succinate ER 50 milliGRAM(s) Oral daily  tamsulosin 0.4 milliGRAM(s) Oral at bedtime      Vital Signs Last 24 Hrs  T(C): 36.9 (02 Apr 2019 04:49), Max: 36.9 (02 Apr 2019 04:49)  T(F): 98.4 (02 Apr 2019 04:49), Max: 98.4 (02 Apr 2019 04:49)  HR: 105 (02 Apr 2019 04:49) (90 - 105)  BP: 123/78 (02 Apr 2019 04:49) (112/73 - 140/88)  BP(mean): --  RR: 17 (02 Apr 2019 04:49) (17 - 18)  SpO2: 100% (02 Apr 2019 04:49) (98% - 100%)      Physical Exam:  Constitutional: frail looking  HEENT: NC/AT, EOMI, PERRLA, conjunctivae clear  Neck: supple; thyroid not palpable  Back: no tenderness  Respiratory: decreased breath sounds  Cardiovascular: S1S2 regular, no murmurs  Abdomen: soft, not tender, not distended, positive BS; liver and spleen WNL  Genitourinary: suprapubic tenderness  Lymphatic: no LN palpable  Musculoskeletal: no muscle tenderness, no joint swelling or tenderness  Extremities: no pedal edema  Left wrist - no further erythema, edema or tenderness  Neurological/ Psychiatric: moving all extremities  Skin: no rash; no palpable lesions    Labs: reviewed                            11.3   11.67 )-----------( 524      ( 02 Apr 2019 06:54 )             33.2     04-02    136  |  101  |  21  ----------------------------<  124<H>  3.7   |  25  |  1.08    Ca    8.8      02 Apr 2019 06:54  Phos  3.0     04-01    TPro  7.5  /  Alb  2.8<L>  /  TBili  0.5  /  DBili  x   /  AST  16  /  ALT  25  /  AlkPhos  53  04-02        Vancomycin Level, Trough: 12.7 ug/mL (03-31 @ 05:10)      Cultures:   Culture - Blood (03.26.19 @ 22:51)    Specimen Source: .Blood None    Culture Results:   No growth at 5 days.    Culture - Blood (03.26.19 @ 22:51)    Specimen Source: .Blood None    Culture Results:   No growth at 5 days.      Culture - Blood (03.24.19 @ 18:49)    Specimen Source: .Blood None    Culture Results:   No growth to date.      Culture - Urine (03.24.19 @ 01:06)    Specimen Source: .Urine Clean Catch (Midstream)    Culture Results:   No growth    Radiology: all available radiological tests reviewed    EXAM:  CT BRAIN                            PROCEDURE DATE:  03/23/2019          INTERPRETATION:      CT head without IV contrast        CLINICAL INFORMATION:        Headache hypertension    TECHNIQUE: Contiguous axial 5 mm sections were obtained through the head.   Sagittal and coronal 2-D reformatted images were also obtained.   This   scan was performed using automatic exposure control (radiation dose   reduction software) to obtain a diagnostic image quality scan with   patient dose as low as reasonably achievable.     FINDINGS:   No previous examinations are available for review.    The brain demonstrates encephalomalacia and gliosis in the LEFT frontal   and temporal lobes, likely sequela of prior trauma. Mild anterior   periventricular and bifrontal some cortical white matter ischemia is   noted.   No acute cerebral cortical infarct is seen.  No intracranial   hemorrhage is found.  No mass effect is found in the brain.      The ventricles, sulci and basal cisterns appear unremarkable.         The orbits are unremarkable.  The paranasal sinuses are significant for   small retention cyst in the RIGHT sphenoid sinus.  The nasal cavity   appears intact.  The nasopharynx is symmetric.  The central skull base,   petrous temporal bones and calvarium remain intact.      IMPRESSION:   Mild encephalomalacia and gliosis in the LEFT frontal and   temporal lobes, likely sequela of prior trauma. Mild anterior   periventricular and bifrontal some cortical white matter ischemia is   noted.   Small retention cyst in RIGHT sphenoid sinus.      EXAM:  CT ABDOMEN AND PELVIS                            PROCEDURE DATE:  03/23/2019          INTERPRETATION:  HISTORY: Pain and difficulty urinating. Evaluate for   stone.    TECHNIQUE: CT scan of the abdomen and pelvis was performed from the domes  of the diaphragm to the symphysis pubis without oral or intravenous   contrast.  Coronal and sagittal reformatted images are provided.    COMPARISON: There is no prior study for comparison.    FINDINGS:   The lung bases are clear. The heart is not enlarged.    Evaluation of the parenchymal organs and vascular structures is limited   without intravenous contrast.     There is mild right hydroureteronephrosis secondary to a 3 mm stone near   the UVJ within the bladder. There is mild left hydroureteronephrosis   without obstructing ureteral calculus. There is no significant   perinephric stranding. The unenhanced appearance of the liver,   gallbladder, spleen, pancreas and adrenal glands is unremarkable. Small   left renal cyst is present.    Is no bowel obstruction, free air or ascites. The appendix is normal.   There is descending and sigmoid colon diverticulosis without evidence of   diverticulitis.    The abdominal aorta is normal in caliber. There is no retroperitoneal,   pelvic or inguinal adenopathy.     Mild the bladder is not thick-walled there is perivesicular fat stranding   suggesting inflammation. The prostate gland is enlarged.    There are no acute osseous abnormalities. There our degenerative changes   in the lumbar spine.    IMPRESSION:   Mild bilateral hydroureteronephrosis without significant perinephric   stranding. 3 mm bladder stone near the right UVJ,? Recently passed versus   chronic. Perivesicular fat stranding concerning for cystitis. Correlation   with urinalysis is recommended.    < from: CT Wrist No Cont, Left (03.28.19 @ 14:20) >    EXAM:  CT WRIST ONLY LT                          EXAM:  CT 3D RECONSTRUCT WO DIPIKA                            PROCEDURE DATE:  03/28/2019          INTERPRETATION:  HISTORY: Edematous wrist and forearm. History of injury   20 years ago.    Helical CT imaging of the left wrist was performed without intravenous   contrast. Sagittal and coronal reformats were provided. 3-D reformats   were performed on a separate workstation.    Findings:    There is a chronic posttraumatic deformity of the distal radius with   dorsal downsloping of the distal radial articular surface and osteophytic   ridging along the volar lip. There is pseudoarticulation between the   hypertrophic changes along the volar lip of the radius and the volar   proximal aspectof the lunate and scaphoid with associated subchondral   cystic change. There is osseous productive change noted along the dorsal   aspect of the scaphoid at the proximal pole likely related to   posttraumatic changes. Carpus appears slightly dorsally subluxed.    There is slight volar subluxation of the ulna relative to the radius with   moderate to severe distal radial ulnar joint arthrosis. There is mild   scaphotrapezial trapezoidal joint arthrosis.    There is prominent soft tissue edema noted about the dorsum of the   carpus. There is fullness about the second and sixth extensor   compartments at the level of the distal radius and ulnar styloid,   respectively. Findings are suspicious for underlying tenosynovitis. There   is joint fluidabout the dorsal aspect of the radiocarpal joint and the   region of the distal radioulnar joint.    Impression:    Chronic appearing deformity of the distal radius with dorsal downsloping   and degenerative pseudoarthrosis between the volar lip of the distal   radius and the volar aspect of the scaphoid and lunate. 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    Nonspecific soft tissue edema along the dorsum of the carpus. Findings   suspicious for tenosynovitis but the second and sixth extensor   compartment tendons. Evidence of radiocarpal and distal radioulnar joint   effusions.          Advanced directives addressed: full resuscitation

## 2019-04-04 DIAGNOSIS — Z71.89 OTHER SPECIFIED COUNSELING: ICD-10-CM

## 2019-04-07 DIAGNOSIS — L03.114 CELLULITIS OF LEFT UPPER LIMB: ICD-10-CM

## 2019-04-07 DIAGNOSIS — N17.9 ACUTE KIDNEY FAILURE, UNSPECIFIED: ICD-10-CM

## 2019-04-07 DIAGNOSIS — E87.1 HYPO-OSMOLALITY AND HYPONATREMIA: ICD-10-CM

## 2019-04-07 DIAGNOSIS — M19.032 PRIMARY OSTEOARTHRITIS, LEFT WRIST: ICD-10-CM

## 2019-04-07 DIAGNOSIS — I10 ESSENTIAL (PRIMARY) HYPERTENSION: ICD-10-CM

## 2019-04-07 DIAGNOSIS — A41.9 SEPSIS, UNSPECIFIED ORGANISM: ICD-10-CM

## 2019-04-07 DIAGNOSIS — N20.0 CALCULUS OF KIDNEY: ICD-10-CM

## 2019-04-07 DIAGNOSIS — E11.9 TYPE 2 DIABETES MELLITUS WITHOUT COMPLICATIONS: ICD-10-CM

## 2019-04-07 DIAGNOSIS — N40.0 BENIGN PROSTATIC HYPERPLASIA WITHOUT LOWER URINARY TRACT SYMPTOMS: ICD-10-CM

## 2019-04-07 DIAGNOSIS — N13.6 PYONEPHROSIS: ICD-10-CM

## 2019-04-09 PROBLEM — I10 ESSENTIAL (PRIMARY) HYPERTENSION: Chronic | Status: ACTIVE | Noted: 2019-03-23

## 2019-04-11 ENCOUNTER — APPOINTMENT (OUTPATIENT)
Age: 69
End: 2019-04-11
Payer: MEDICAID

## 2019-04-11 VITALS
WEIGHT: 165 LBS | OXYGEN SATURATION: 80 % | SYSTOLIC BLOOD PRESSURE: 136 MMHG | BODY MASS INDEX: 29.23 KG/M2 | DIASTOLIC BLOOD PRESSURE: 84 MMHG | HEIGHT: 63 IN | TEMPERATURE: 98.2 F | HEART RATE: 97 BPM

## 2019-04-11 PROCEDURE — 51705 CHANGE OF BLADDER TUBE: CPT

## 2019-05-02 ENCOUNTER — APPOINTMENT (OUTPATIENT)
Dept: UROLOGY | Facility: CLINIC | Age: 69
End: 2019-05-02
Payer: MEDICAID

## 2019-05-02 PROCEDURE — 51702 INSERT TEMP BLADDER CATH: CPT

## 2019-05-02 PROCEDURE — 99213 OFFICE O/P EST LOW 20 MIN: CPT | Mod: 25

## 2019-05-02 NOTE — END OF VISIT
[FreeTextEntry3] : He will start bethanechol, tamsulosin, and we'll continue with finasteride. He'll follow the urodynamic study in 2

## 2019-05-02 NOTE — PHYSICAL EXAM
[General Appearance - Well Developed] : well developed [General Appearance - Well Nourished] : well nourished [Normal Appearance] : normal appearance [Well Groomed] : well groomed [Edema] : no peripheral edema [General Appearance - In No Acute Distress] : no acute distress [Respiration, Rhythm And Depth] : normal respiratory rhythm and effort [Abdomen Soft] : soft [Exaggerated Use Of Accessory Muscles For Inspiration] : no accessory muscle use [Costovertebral Angle Tenderness] : no ~M costovertebral angle tenderness [Abdomen Tenderness] : non-tender [Urinary Bladder Findings] : the bladder was normal on palpation [Scrotum] : the scrotum was normal [Urethral Meatus] : meatus normal [No Prostate Nodules] : no prostate nodules [Testes Mass (___cm)] : there were no testicular masses [Normal Station and Gait] : the gait and station were normal for the patient's age [] : no rash [No Focal Deficits] : no focal deficits [Oriented To Time, Place, And Person] : oriented to person, place, and time [Affect] : the affect was normal [Mood] : the mood was normal [Not Anxious] : not anxious [No Palpable Adenopathy] : no palpable adenopathy

## 2019-05-02 NOTE — HISTORY OF PRESENT ILLNESS
[FreeTextEntry1] : This patient is here for a catheter change. I would like to move ahead and start making plans for catheter removal.

## 2019-05-02 NOTE — PHYSICAL EXAM
[General Appearance - Well Developed] : well developed [Well Groomed] : well groomed [Normal Appearance] : normal appearance [General Appearance - Well Nourished] : well nourished [Abdomen Tenderness] : non-tender [Abdomen Soft] : soft [General Appearance - In No Acute Distress] : no acute distress [Costovertebral Angle Tenderness] : no ~M costovertebral angle tenderness [Urethral Meatus] : meatus normal [Urinary Bladder Findings] : the bladder was normal on palpation [Testes Mass (___cm)] : there were no testicular masses [Scrotum] : the scrotum was normal [No Prostate Nodules] : no prostate nodules [Edema] : no peripheral edema [] : no respiratory distress [Respiration, Rhythm And Depth] : normal respiratory rhythm and effort [Exaggerated Use Of Accessory Muscles For Inspiration] : no accessory muscle use [Oriented To Time, Place, And Person] : oriented to person, place, and time [Mood] : the mood was normal [Affect] : the affect was normal [Not Anxious] : not anxious [Normal Station and Gait] : the gait and station were normal for the patient's age [No Focal Deficits] : no focal deficits [No Palpable Adenopathy] : no palpable adenopathy

## 2019-05-16 ENCOUNTER — MEDICATION RENEWAL (OUTPATIENT)
Age: 69
End: 2019-05-16

## 2019-05-23 ENCOUNTER — APPOINTMENT (OUTPATIENT)
Dept: UROLOGY | Facility: CLINIC | Age: 69
End: 2019-05-23
Payer: MEDICAID

## 2019-05-23 VITALS — DIASTOLIC BLOOD PRESSURE: 81 MMHG | HEART RATE: 92 BPM | OXYGEN SATURATION: 96 % | SYSTOLIC BLOOD PRESSURE: 149 MMHG

## 2019-05-23 PROCEDURE — 51797 INTRAABDOMINAL PRESSURE TEST: CPT

## 2019-05-23 PROCEDURE — 51784 ANAL/URINARY MUSCLE STUDY: CPT

## 2019-05-23 PROCEDURE — 51741 ELECTRO-UROFLOWMETRY FIRST: CPT

## 2019-05-23 PROCEDURE — 51728 CYSTOMETROGRAM W/VP: CPT

## 2019-05-24 ENCOUNTER — EMERGENCY (EMERGENCY)
Facility: HOSPITAL | Age: 69
LOS: 0 days | Discharge: HOME | End: 2019-05-24
Attending: EMERGENCY MEDICINE | Admitting: EMERGENCY MEDICINE
Payer: MEDICAID

## 2019-05-24 VITALS
TEMPERATURE: 98 F | HEIGHT: 65 IN | WEIGHT: 169.98 LBS | HEART RATE: 90 BPM | SYSTOLIC BLOOD PRESSURE: 144 MMHG | OXYGEN SATURATION: 99 % | DIASTOLIC BLOOD PRESSURE: 95 MMHG | RESPIRATION RATE: 20 BRPM

## 2019-05-24 VITALS
OXYGEN SATURATION: 100 % | RESPIRATION RATE: 18 BRPM | SYSTOLIC BLOOD PRESSURE: 138 MMHG | HEART RATE: 88 BPM | DIASTOLIC BLOOD PRESSURE: 85 MMHG | TEMPERATURE: 98 F

## 2019-05-24 DIAGNOSIS — E78.5 HYPERLIPIDEMIA, UNSPECIFIED: ICD-10-CM

## 2019-05-24 DIAGNOSIS — N40.1 BENIGN PROSTATIC HYPERPLASIA WITH LOWER URINARY TRACT SYMPTOMS: ICD-10-CM

## 2019-05-24 DIAGNOSIS — R33.8 OTHER RETENTION OF URINE: ICD-10-CM

## 2019-05-24 DIAGNOSIS — I10 ESSENTIAL (PRIMARY) HYPERTENSION: ICD-10-CM

## 2019-05-24 DIAGNOSIS — R31.9 HEMATURIA, UNSPECIFIED: ICD-10-CM

## 2019-05-24 LAB
APPEARANCE UR: CLEAR — SIGNIFICANT CHANGE UP
BACTERIA # UR AUTO: ABNORMAL
BILIRUB UR-MCNC: NEGATIVE — SIGNIFICANT CHANGE UP
COLOR SPEC: YELLOW — SIGNIFICANT CHANGE UP
DIFF PNL FLD: ABNORMAL
EPI CELLS # UR: NEGATIVE — SIGNIFICANT CHANGE UP
GLUCOSE UR QL: NEGATIVE MG/DL — SIGNIFICANT CHANGE UP
KETONES UR-MCNC: NEGATIVE — SIGNIFICANT CHANGE UP
LEUKOCYTE ESTERASE UR-ACNC: ABNORMAL
NITRITE UR-MCNC: NEGATIVE — SIGNIFICANT CHANGE UP
PH UR: 7 — SIGNIFICANT CHANGE UP (ref 5–8)
PROT UR-MCNC: NEGATIVE MG/DL — SIGNIFICANT CHANGE UP
RBC CASTS # UR COMP ASSIST: SIGNIFICANT CHANGE UP /HPF (ref 0–4)
SP GR SPEC: 1.01 — SIGNIFICANT CHANGE UP (ref 1.01–1.02)
UROBILINOGEN FLD QL: NEGATIVE MG/DL — SIGNIFICANT CHANGE UP
WBC UR QL: ABNORMAL

## 2019-05-24 PROCEDURE — 99284 EMERGENCY DEPT VISIT MOD MDM: CPT | Mod: 25

## 2019-05-24 RX ORDER — MOXIFLOXACIN HYDROCHLORIDE TABLETS, 400 MG 400 MG/1
1 TABLET, FILM COATED ORAL
Qty: 14 | Refills: 0
Start: 2019-05-24 | End: 2019-05-30

## 2019-05-24 RX ORDER — OXYCODONE HYDROCHLORIDE 5 MG/1
5 TABLET ORAL ONCE
Refills: 0 | Status: DISCONTINUED | OUTPATIENT
Start: 2019-05-24 | End: 2019-05-24

## 2019-05-24 RX ORDER — CIPROFLOXACIN LACTATE 400MG/40ML
500 VIAL (ML) INTRAVENOUS ONCE
Refills: 0 | Status: COMPLETED | OUTPATIENT
Start: 2019-05-24 | End: 2019-05-24

## 2019-05-24 RX ADMIN — OXYCODONE HYDROCHLORIDE 5 MILLIGRAM(S): 5 TABLET ORAL at 02:15

## 2019-05-24 RX ADMIN — Medication 500 MILLIGRAM(S): at 02:15

## 2019-05-24 RX ADMIN — OXYCODONE HYDROCHLORIDE 5 MILLIGRAM(S): 5 TABLET ORAL at 02:30

## 2019-05-24 NOTE — ED ADULT NURSE NOTE - NSIMPLEMENTINTERV_GEN_ALL_ED
Implemented All Universal Safety Interventions:  [place] to call system. Call bell, personal items and telephone within reach. Instruct patient to call for assistance. Room bathroom lighting operational. Non-slip footwear when patient is off stretcher. Physically safe environment: no spills, clutter or unnecessary equipment. Stretcher in lowest position, wheels locked, appropriate side rails in place.

## 2019-05-24 NOTE — ED PROVIDER NOTE - CARE PROVIDER_API CALL
Chris Pemberton)  Urology  [address] [facility], [address]nd [place] [address]  Phone: [phone]  Fax: [phone]  Follow Up Time:

## 2019-05-24 NOTE — ED ADULT NURSE NOTE - OBJECTIVE STATEMENT
pt presents to ED with complaints of urinary retention, straining to pee, and burning with urination. pt states he had peterson x 2 months that was removed by MD Pemberton today. pt has had difficulty urinating since. 16 FR peterson placed at this time. pt put out 700 cc of yello urine. UA and UC sent. pt to be D/C with leg bag and abx.

## 2019-05-24 NOTE — ED PROVIDER NOTE - NSFOLLOWUPINSTRUCTIONS_ED_ALL_ED_FT
Follow up with Dr Pemberton today to re-evaluate the peterson catheter  Return to the ER for any further concerning symptoms   Cipro 500mg twice daily     Urinary Retention    Urinary retention is the inability to completely empty your bladder. This is a common problem in older men, especially with enlarged prostates. If you are sent home with a peterson catheter and a drainage system make sure to keep the drainage bag emptied and lower than your catheter. Keep the peterson catheter in until you follow up with a urologist.    SEEK IMMEDIATE MEDICAL CARE IF YOU DEVELOP THE FOLLOWING SYMPTOMS: the catheter stops draining urine, the catheter falls out, abdominal pain, nausea/vomiting, or chills/fever.

## 2019-05-26 NOTE — ED POST DISCHARGE NOTE - RESULT SUMMARY
+UC Contacted patient wife by phone (used Armenian translater ID # [id number] and instructed to have patient return call to ED for results of UC. Yuridia NP

## 2019-05-27 ENCOUNTER — EMERGENCY (EMERGENCY)
Facility: HOSPITAL | Age: 69
LOS: 0 days | Discharge: HOME | End: 2019-05-27
Attending: EMERGENCY MEDICINE | Admitting: EMERGENCY MEDICINE
Payer: MEDICAID

## 2019-05-27 VITALS
SYSTOLIC BLOOD PRESSURE: 113 MMHG | HEART RATE: 89 BPM | OXYGEN SATURATION: 100 % | RESPIRATION RATE: 18 BRPM | DIASTOLIC BLOOD PRESSURE: 71 MMHG | TEMPERATURE: 98 F

## 2019-05-27 VITALS — WEIGHT: 149.91 LBS | HEIGHT: 65 IN

## 2019-05-27 DIAGNOSIS — I10 ESSENTIAL (PRIMARY) HYPERTENSION: ICD-10-CM

## 2019-05-27 DIAGNOSIS — T83.031A LEAKAGE OF INDWELLING URETHRAL CATHETER, INITIAL ENCOUNTER: ICD-10-CM
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
## 2019-05-27 DIAGNOSIS — N40.0 BENIGN PROSTATIC HYPERPLASIA WITHOUT LOWER URINARY TRACT SYMPTOMS: ICD-10-CM

## 2019-05-27 PROBLEM — E78.5 HYPERLIPIDEMIA, UNSPECIFIED: Chronic | Status: ACTIVE | Noted: 2019-05-24

## 2019-05-27 LAB
APPEARANCE UR: ABNORMAL
BACTERIA # UR AUTO: ABNORMAL
BILIRUB UR-MCNC: NEGATIVE — SIGNIFICANT CHANGE UP
COLOR SPEC: YELLOW — SIGNIFICANT CHANGE UP
COMMENT - URINE: SIGNIFICANT CHANGE UP
DIFF PNL FLD: ABNORMAL
EPI CELLS # UR: NEGATIVE — SIGNIFICANT CHANGE UP
GLUCOSE UR QL: NEGATIVE MG/DL — SIGNIFICANT CHANGE UP
KETONES UR-MCNC: NEGATIVE — SIGNIFICANT CHANGE UP
LEUKOCYTE ESTERASE UR-ACNC: ABNORMAL
NITRITE UR-MCNC: NEGATIVE — SIGNIFICANT CHANGE UP
PH UR: 5 — SIGNIFICANT CHANGE UP (ref 5–8)
PROT UR-MCNC: 30 MG/DL
RBC CASTS # UR COMP ASSIST: ABNORMAL /HPF (ref 0–4)
SP GR SPEC: 1.01 — SIGNIFICANT CHANGE UP (ref 1.01–1.02)
UROBILINOGEN FLD QL: NEGATIVE MG/DL — SIGNIFICANT CHANGE UP
WBC UR QL: ABNORMAL

## 2019-05-27 PROCEDURE — 99283 EMERGENCY DEPT VISIT LOW MDM: CPT | Mod: 25

## 2019-05-27 RX ORDER — NITROFURANTOIN MACROCRYSTAL 50 MG
1 CAPSULE ORAL
Qty: 14 | Refills: 0
Start: 2019-05-27 | End: 2019-06-02

## 2019-05-27 RX ORDER — NITROFURANTOIN MACROCRYSTAL 50 MG
100 CAPSULE ORAL ONCE
Refills: 0 | Status: COMPLETED | OUTPATIENT
Start: 2019-05-27 | End: 2019-05-27

## 2019-05-27 RX ADMIN — Medication 100 MILLIGRAM(S): at 11:27

## 2019-05-27 NOTE — ED STATDOCS - OBJECTIVE STATEMENT
70 y/o male with a PMHx of BPH, HLD, HTN presents to the ED c/o Garcia catheter complication. Pt recently in HH yesterday for Garcia catheter complications. Found to have a UTI and given abx. Pt states today his Garcia is leaking and he has urine spilling on to himself so came to . Denies fever, abd pain. [place]  used, ID#: [id number]

## 2019-05-27 NOTE — ED ADULT TRIAGE NOTE - CHIEF COMPLAINT QUOTE
c/o peterson catheter dislodge for past 4 days, c/o pain at insertion site, pt states he has urine spilling on himself and bed at home

## 2019-05-27 NOTE — ED STATDOCS - CONSTITUTIONAL, MLM
Patient informed. She states she has not started the vitamin d yet however will be picking it up to start. normal... well appearing, well nourished, and in no apparent distress.

## 2019-05-27 NOTE — ED STATDOCS - PROGRESS NOTE DETAILS
68 y/o male with a PMHx of BPH, HLD, HTN presents to the ED c/o Garcia catheter complication. Pt recently in HH yesterday for Garcia catheter complications. Found to have a UTI and given abx. Pt states today his Garcia is leaking and he has urine spilling on to himself so came to . Denies fever, abd pain. [employer]  used, ID#: [id number]   Esme Santos PA-C Culture with ESBL on Cipro.  Paged Dr. Pemberton.  Esme Santos PA-C I spoke with Dr. Raymundo, he suggested Macrobid as it is probable contaminant and Follow up with Dr. Pemberton.  Esme Santos PA-C AJML peterson replaced with success. dc

## 2019-05-27 NOTE — ED STATDOCS - CLINICAL SUMMARY MEDICAL DECISION MAKING FREE TEXT BOX
Pt presenting with leaking Garcia catheter. No trauma, signs of infection. Benign abd exam. Will repeat UA, cultures, and replace Garcia catheter. Pt presenting with leaking Garcia catheter. No trauma, signs of infection. Benign abd exam. Will repeat UA, cultures, and replace Garcia catheter.    I spoke with Dr. Raymundo, he suggested Macrobid as it is probable contaminant and Follow up with Dr. Pemberton.  Esme Santos PA-C

## 2019-05-27 NOTE — ED ADULT NURSE NOTE - NSIMPLEMENTINTERV_GEN_ALL_ED
Implemented All Universal Safety Interventions:  [employer] to call system. Call bell, personal items and telephone within reach. Instruct patient to call for assistance. Room bathroom lighting operational. Non-slip footwear when patient is off stretcher. Physically safe environment: no spills, clutter or unnecessary equipment. Stretcher in lowest position, wheels locked, appropriate side rails in place.

## 2019-05-30 NOTE — ED POST DISCHARGE NOTE - RESULT SUMMARY
ESBL + on urine. Notes indicate possible contaminant. Dr. Raymundo recommended to initiate macrobid, patient dc'd with macrobid. Has FU with Dr. Pemberton - Jose Whiteside PA-C

## 2019-05-31 ENCOUNTER — APPOINTMENT (OUTPATIENT)
Dept: UROLOGY | Facility: CLINIC | Age: 69
End: 2019-05-31
Payer: MEDICAID

## 2019-05-31 VITALS
SYSTOLIC BLOOD PRESSURE: 132 MMHG | BODY MASS INDEX: 29.23 KG/M2 | DIASTOLIC BLOOD PRESSURE: 77 MMHG | RESPIRATION RATE: 14 BRPM | HEART RATE: 90 BPM | OXYGEN SATURATION: 97 % | WEIGHT: 165 LBS | HEIGHT: 63 IN | TEMPERATURE: 98.1 F

## 2019-05-31 PROCEDURE — 76872 US TRANSRECTAL: CPT

## 2019-06-01 ENCOUNTER — OUTPATIENT (OUTPATIENT)
Dept: OUTPATIENT SERVICES | Facility: HOSPITAL | Age: 69
LOS: 1 days | End: 2019-06-01
Payer: MEDICAID

## 2019-06-01 PROCEDURE — G9005: CPT

## 2019-06-11 ENCOUNTER — APPOINTMENT (OUTPATIENT)
Dept: UROLOGY | Facility: CLINIC | Age: 69
End: 2019-06-11
Payer: MEDICAID

## 2019-06-11 ENCOUNTER — EMERGENCY (EMERGENCY)
Facility: HOSPITAL | Age: 69
LOS: 0 days | Discharge: HOME | End: 2019-06-12
Attending: EMERGENCY MEDICINE | Admitting: EMERGENCY MEDICINE
Payer: MEDICAID

## 2019-06-11 VITALS — HEIGHT: 65 IN | WEIGHT: 149.91 LBS

## 2019-06-11 VITALS — OXYGEN SATURATION: 95 % | HEART RATE: 88 BPM | DIASTOLIC BLOOD PRESSURE: 90 MMHG | SYSTOLIC BLOOD PRESSURE: 155 MMHG

## 2019-06-11 DIAGNOSIS — R31.9 HEMATURIA, UNSPECIFIED: ICD-10-CM

## 2019-06-11 DIAGNOSIS — N40.0 BENIGN PROSTATIC HYPERPLASIA WITHOUT LOWER URINARY TRACT SYMPTOMS: ICD-10-CM

## 2019-06-11 DIAGNOSIS — R33.9 RETENTION OF URINE, UNSPECIFIED: ICD-10-CM

## 2019-06-11 DIAGNOSIS — E78.5 HYPERLIPIDEMIA, UNSPECIFIED: ICD-10-CM

## 2019-06-11 DIAGNOSIS — N39.0 URINARY TRACT INFECTION, SITE NOT SPECIFIED: ICD-10-CM

## 2019-06-11 DIAGNOSIS — I10 ESSENTIAL (PRIMARY) HYPERTENSION: ICD-10-CM

## 2019-06-11 PROCEDURE — 99284 EMERGENCY DEPT VISIT MOD MDM: CPT

## 2019-06-11 PROCEDURE — 52214Z: CUSTOM

## 2019-06-11 NOTE — ED ADULT TRIAGE NOTE - CHIEF COMPLAINT QUOTE
peterson catheter placed this morning, reports peterson catheter not draining since this afternoon. c/o pain and pressure to suprapubic area

## 2019-06-12 ENCOUNTER — APPOINTMENT (OUTPATIENT)
Dept: UROLOGY | Facility: CLINIC | Age: 69
End: 2019-06-12
Payer: MEDICAID

## 2019-06-12 VITALS
DIASTOLIC BLOOD PRESSURE: 84 MMHG | SYSTOLIC BLOOD PRESSURE: 133 MMHG | HEART RATE: 82 BPM | OXYGEN SATURATION: 95 % | RESPIRATION RATE: 17 BRPM

## 2019-06-12 LAB
ALBUMIN SERPL ELPH-MCNC: 3.4 G/DL — SIGNIFICANT CHANGE UP (ref 3.3–5)
ALP SERPL-CCNC: 64 U/L — SIGNIFICANT CHANGE UP (ref 40–120)
ALT FLD-CCNC: 17 U/L — SIGNIFICANT CHANGE UP (ref 12–78)
ANION GAP SERPL CALC-SCNC: 8 MMOL/L — SIGNIFICANT CHANGE UP (ref 5–17)
APPEARANCE UR: CLEAR — SIGNIFICANT CHANGE UP
AST SERPL-CCNC: 16 U/L — SIGNIFICANT CHANGE UP (ref 15–37)
BACTERIA # UR AUTO: ABNORMAL
BASOPHILS # BLD AUTO: 0.07 K/UL — SIGNIFICANT CHANGE UP (ref 0–0.2)
BASOPHILS NFR BLD AUTO: 0.7 % — SIGNIFICANT CHANGE UP (ref 0–2)
BILIRUB SERPL-MCNC: 0.3 MG/DL — SIGNIFICANT CHANGE UP (ref 0.2–1.2)
BILIRUB UR-MCNC: NEGATIVE — SIGNIFICANT CHANGE UP
BUN SERPL-MCNC: 12 MG/DL — SIGNIFICANT CHANGE UP (ref 7–23)
CALCIUM SERPL-MCNC: 8.9 MG/DL — SIGNIFICANT CHANGE UP (ref 8.5–10.1)
CHLORIDE SERPL-SCNC: 99 MMOL/L — SIGNIFICANT CHANGE UP (ref 96–108)
CO2 SERPL-SCNC: 25 MMOL/L — SIGNIFICANT CHANGE UP (ref 22–31)
COLOR SPEC: YELLOW — SIGNIFICANT CHANGE UP
CREAT SERPL-MCNC: 1.06 MG/DL — SIGNIFICANT CHANGE UP (ref 0.5–1.3)
DIFF PNL FLD: ABNORMAL
EOSINOPHIL # BLD AUTO: 0.16 K/UL — SIGNIFICANT CHANGE UP (ref 0–0.5)
EOSINOPHIL NFR BLD AUTO: 1.6 % — SIGNIFICANT CHANGE UP (ref 0–6)
EPI CELLS # UR: SIGNIFICANT CHANGE UP
GLUCOSE SERPL-MCNC: 117 MG/DL — HIGH (ref 70–99)
GLUCOSE UR QL: NEGATIVE MG/DL — SIGNIFICANT CHANGE UP
HCT VFR BLD CALC: 35.8 % — LOW (ref 39–50)
HGB BLD-MCNC: 11.6 G/DL — LOW (ref 13–17)
IMM GRANULOCYTES NFR BLD AUTO: 0.4 % — SIGNIFICANT CHANGE UP (ref 0–1.5)
KETONES UR-MCNC: NEGATIVE — SIGNIFICANT CHANGE UP
LEUKOCYTE ESTERASE UR-ACNC: ABNORMAL
LYMPHOCYTES # BLD AUTO: 1.46 K/UL — SIGNIFICANT CHANGE UP (ref 1–3.3)
LYMPHOCYTES # BLD AUTO: 14.7 % — SIGNIFICANT CHANGE UP (ref 13–44)
MCHC RBC-ENTMCNC: 27.4 PG — SIGNIFICANT CHANGE UP (ref 27–34)
MCHC RBC-ENTMCNC: 32.4 GM/DL — SIGNIFICANT CHANGE UP (ref 32–36)
MCV RBC AUTO: 84.6 FL — SIGNIFICANT CHANGE UP (ref 80–100)
MONOCYTES # BLD AUTO: 0.75 K/UL — SIGNIFICANT CHANGE UP (ref 0–0.9)
MONOCYTES NFR BLD AUTO: 7.5 % — SIGNIFICANT CHANGE UP (ref 2–14)
NEUTROPHILS # BLD AUTO: 7.46 K/UL — HIGH (ref 1.8–7.4)
NEUTROPHILS NFR BLD AUTO: 75.1 % — SIGNIFICANT CHANGE UP (ref 43–77)
NITRITE UR-MCNC: NEGATIVE — SIGNIFICANT CHANGE UP
PH UR: 7 — SIGNIFICANT CHANGE UP (ref 5–8)
PLATELET # BLD AUTO: 388 K/UL — SIGNIFICANT CHANGE UP (ref 150–400)
POTASSIUM SERPL-MCNC: 3.4 MMOL/L — LOW (ref 3.5–5.3)
POTASSIUM SERPL-SCNC: 3.4 MMOL/L — LOW (ref 3.5–5.3)
PROT SERPL-MCNC: 7.4 GM/DL — SIGNIFICANT CHANGE UP (ref 6–8.3)
PROT UR-MCNC: NEGATIVE MG/DL — SIGNIFICANT CHANGE UP
RBC # BLD: 4.23 M/UL — SIGNIFICANT CHANGE UP (ref 4.2–5.8)
RBC # FLD: 14.4 % — SIGNIFICANT CHANGE UP (ref 10.3–14.5)
RBC CASTS # UR COMP ASSIST: ABNORMAL /HPF (ref 0–4)
SODIUM SERPL-SCNC: 132 MMOL/L — LOW (ref 135–145)
SP GR SPEC: 1 — LOW (ref 1.01–1.02)
UROBILINOGEN FLD QL: NEGATIVE MG/DL — SIGNIFICANT CHANGE UP
WBC # BLD: 9.94 K/UL — SIGNIFICANT CHANGE UP (ref 3.8–10.5)
WBC # FLD AUTO: 9.94 K/UL — SIGNIFICANT CHANGE UP (ref 3.8–10.5)
WBC UR QL: ABNORMAL

## 2019-06-12 PROCEDURE — 99213 OFFICE O/P EST LOW 20 MIN: CPT

## 2019-06-12 RX ORDER — CIPROFLOXACIN LACTATE 400MG/40ML
500 VIAL (ML) INTRAVENOUS ONCE
Refills: 0 | Status: COMPLETED | OUTPATIENT
Start: 2019-06-12 | End: 2019-06-12

## 2019-06-12 RX ORDER — MOXIFLOXACIN HYDROCHLORIDE TABLETS, 400 MG 400 MG/1
1 TABLET, FILM COATED ORAL
Qty: 14 | Refills: 0
Start: 2019-06-12 | End: 2019-06-18

## 2019-06-12 RX ADMIN — Medication 500 MILLIGRAM(S): at 00:59

## 2019-06-12 NOTE — HISTORY OF PRESENT ILLNESS
[FreeTextEntry1] : The patient had been seen yesterday and failed a trial of void with catheter placement in emergency room.

## 2019-06-12 NOTE — PHYSICAL EXAM
[General Appearance - Well Developed] : well developed [General Appearance - Well Nourished] : well nourished [Normal Appearance] : normal appearance [Well Groomed] : well groomed [General Appearance - In No Acute Distress] : no acute distress [Abdomen Soft] : soft [Abdomen Tenderness] : non-tender [Costovertebral Angle Tenderness] : no ~M costovertebral angle tenderness [Urethral Meatus] : meatus normal [Urinary Bladder Findings] : the bladder was normal on palpation [Scrotum] : the scrotum was normal [Testes Mass (___cm)] : there were no testicular masses [No Prostate Nodules] : no prostate nodules [] : no respiratory distress [Edema] : no peripheral edema [Respiration, Rhythm And Depth] : normal respiratory rhythm and effort [Exaggerated Use Of Accessory Muscles For Inspiration] : no accessory muscle use [Oriented To Time, Place, And Person] : oriented to person, place, and time [Affect] : the affect was normal [Mood] : the mood was normal [Not Anxious] : not anxious [Normal Station and Gait] : the gait and station were normal for the patient's age [No Focal Deficits] : no focal deficits [No Palpable Adenopathy] : no palpable adenopathy

## 2019-06-12 NOTE — ED PROVIDER NOTE - CARE PLAN
Principal Discharge DX:	Acute urinary retention  Secondary Diagnosis:	Urinary tract infection without hematuria, site unspecified

## 2019-06-12 NOTE — ED ADULT NURSE NOTE - NSIMPLEMENTINTERV_GEN_ALL_ED
Implemented All Universal Safety Interventions:  [place] to call system. Call bell, personal items and telephone within reach. Instruct patient to call for assistance. Room bathroom lighting operational. Non-slip footwear when patient is off stretcher. Physically safe environment: no spills, clutter or unnecessary equipment. Stretcher in lowest position, wheels locked, appropriate side rails in place.

## 2019-06-12 NOTE — ED PROVIDER NOTE - OBJECTIVE STATEMENT
Pt. is a 70 yo M with a hx of hyperlipidemia, HTN, BPH BIB family for trouble urinating X 1 day.  Pt. having minimal dribbling of urine in the past 1 hour.  Pt. was seen by his doctor this morning for same and was referred to a urologist, Dr. Pemberton tomorrow.  Pt. denies fevers, chills, vomiting.  Family was told to take him to the ED if he is unable to urinate.  He was very uncomfortable and requested he be brought to the ED for a peterson catheter, which he has needed in the past for urinary retention.

## 2019-06-12 NOTE — ED PROVIDER NOTE - NSFOLLOWUPINSTRUCTIONS_ED_ALL_ED_FT
See urology within 1-2 days as planned.  See printout in Burmese on Garcia catheter care, UTI and urinary tract infections.  antibiotics at your pharmacy.

## 2019-06-12 NOTE — END OF VISIT
[FreeTextEntry3] : If the patient is healthy enough for a procedure this will be scheduled. If not he will be stuck with a catheter on a permanent basis. I have written a note his private physician with this in mind.

## 2019-06-12 NOTE — ED ADULT NURSE REASSESSMENT NOTE - NS ED NURSE REASSESS COMMENT FT1
drainage bag changed to leg bag. pt educated on emptying bag- pt understood and return demonstration done.

## 2019-06-12 NOTE — ED ADULT NURSE NOTE - OBJECTIVE STATEMENT
pt presents to ED for difficulty urinating x 1 day. pt was at [facility] today and was told to come to ED if pt was experiencing urinary retention. pt was dribbling urine and unable to fully empty blader. bladder scan revealed 356ml. no distention. peterson catheter placed In ED and 600ml urine output and pt felt better. pt denies fever/chills. denies chest pain/denies sob.

## 2019-06-12 NOTE — ED PROVIDER NOTE - CARE PROVIDER_API CALL
Chris Pemberton)  Urology  [address], [address] [place] [address]  Phone: [phone]  Fax: [phone]  Follow Up Time:

## 2019-06-14 ENCOUNTER — INPATIENT (INPATIENT)
Facility: HOSPITAL | Age: 69
LOS: 4 days | Discharge: HOME | End: 2019-06-19
Attending: INTERNAL MEDICINE | Admitting: INTERNAL MEDICINE
Payer: MEDICAID

## 2019-06-14 VITALS — HEIGHT: 62 IN | WEIGHT: 169.98 LBS

## 2019-06-14 LAB
-  AMIKACIN: SIGNIFICANT CHANGE UP
-  AMPICILLIN/SULBACTAM: SIGNIFICANT CHANGE UP
-  AMPICILLIN: SIGNIFICANT CHANGE UP
-  AZTREONAM: SIGNIFICANT CHANGE UP
-  CEFAZOLIN: SIGNIFICANT CHANGE UP
-  CEFEPIME: SIGNIFICANT CHANGE UP
-  CEFOXITIN: SIGNIFICANT CHANGE UP
-  CEFTRIAXONE: SIGNIFICANT CHANGE UP
-  CIPROFLOXACIN: SIGNIFICANT CHANGE UP
-  ERTAPENEM: SIGNIFICANT CHANGE UP
-  GENTAMICIN: SIGNIFICANT CHANGE UP
-  IMIPENEM: SIGNIFICANT CHANGE UP
-  LEVOFLOXACIN: SIGNIFICANT CHANGE UP
-  MEROPENEM: SIGNIFICANT CHANGE UP
-  NITROFURANTOIN: SIGNIFICANT CHANGE UP
-  PIPERACILLIN/TAZOBACTAM: SIGNIFICANT CHANGE UP
-  TIGECYCLINE: SIGNIFICANT CHANGE UP
-  TOBRAMYCIN: SIGNIFICANT CHANGE UP
-  TRIMETHOPRIM/SULFAMETHOXAZOLE: SIGNIFICANT CHANGE UP
ALBUMIN SERPL ELPH-MCNC: 3.4 G/DL — SIGNIFICANT CHANGE UP (ref 3.3–5)
ALP SERPL-CCNC: 77 U/L — SIGNIFICANT CHANGE UP (ref 40–120)
ALT FLD-CCNC: 25 U/L — SIGNIFICANT CHANGE UP (ref 12–78)
ANION GAP SERPL CALC-SCNC: 9 MMOL/L — SIGNIFICANT CHANGE UP (ref 5–17)
AST SERPL-CCNC: 18 U/L — SIGNIFICANT CHANGE UP (ref 15–37)
BASOPHILS # BLD AUTO: 0.09 K/UL — SIGNIFICANT CHANGE UP (ref 0–0.2)
BASOPHILS NFR BLD AUTO: 1.1 % — SIGNIFICANT CHANGE UP (ref 0–2)
BILIRUB SERPL-MCNC: 0.2 MG/DL — SIGNIFICANT CHANGE UP (ref 0.2–1.2)
BUN SERPL-MCNC: 14 MG/DL — SIGNIFICANT CHANGE UP (ref 7–23)
CALCIUM SERPL-MCNC: 9 MG/DL — SIGNIFICANT CHANGE UP (ref 8.5–10.1)
CHLORIDE SERPL-SCNC: 104 MMOL/L — SIGNIFICANT CHANGE UP (ref 96–108)
CO2 SERPL-SCNC: 26 MMOL/L — SIGNIFICANT CHANGE UP (ref 22–31)
CREAT SERPL-MCNC: 1.3 MG/DL — SIGNIFICANT CHANGE UP (ref 0.5–1.3)
CULTURE RESULTS: SIGNIFICANT CHANGE UP
EOSINOPHIL # BLD AUTO: 0.33 K/UL — SIGNIFICANT CHANGE UP (ref 0–0.5)
EOSINOPHIL NFR BLD AUTO: 4 % — SIGNIFICANT CHANGE UP (ref 0–6)
GLUCOSE SERPL-MCNC: 95 MG/DL — SIGNIFICANT CHANGE UP (ref 70–99)
HCT VFR BLD CALC: 37 % — LOW (ref 39–50)
HGB BLD-MCNC: 11.9 G/DL — LOW (ref 13–17)
IMM GRANULOCYTES NFR BLD AUTO: 1 % — SIGNIFICANT CHANGE UP (ref 0–1.5)
LACTATE SERPL-SCNC: 1.6 MMOL/L — SIGNIFICANT CHANGE UP (ref 0.7–2)
LYMPHOCYTES # BLD AUTO: 2.34 K/UL — SIGNIFICANT CHANGE UP (ref 1–3.3)
LYMPHOCYTES # BLD AUTO: 28.6 % — SIGNIFICANT CHANGE UP (ref 13–44)
MCHC RBC-ENTMCNC: 27.5 PG — SIGNIFICANT CHANGE UP (ref 27–34)
MCHC RBC-ENTMCNC: 32.2 GM/DL — SIGNIFICANT CHANGE UP (ref 32–36)
MCV RBC AUTO: 85.5 FL — SIGNIFICANT CHANGE UP (ref 80–100)
METHOD TYPE: SIGNIFICANT CHANGE UP
MONOCYTES # BLD AUTO: 0.76 K/UL — SIGNIFICANT CHANGE UP (ref 0–0.9)
MONOCYTES NFR BLD AUTO: 9.3 % — SIGNIFICANT CHANGE UP (ref 2–14)
NEUTROPHILS # BLD AUTO: 4.58 K/UL — SIGNIFICANT CHANGE UP (ref 1.8–7.4)
NEUTROPHILS NFR BLD AUTO: 56 % — SIGNIFICANT CHANGE UP (ref 43–77)
ORGANISM # SPEC MICROSCOPIC CNT: SIGNIFICANT CHANGE UP
ORGANISM # SPEC MICROSCOPIC CNT: SIGNIFICANT CHANGE UP
PLATELET # BLD AUTO: 433 K/UL — HIGH (ref 150–400)
POTASSIUM SERPL-MCNC: 3.9 MMOL/L — SIGNIFICANT CHANGE UP (ref 3.5–5.3)
POTASSIUM SERPL-SCNC: 3.9 MMOL/L — SIGNIFICANT CHANGE UP (ref 3.5–5.3)
PROT SERPL-MCNC: 7.5 GM/DL — SIGNIFICANT CHANGE UP (ref 6–8.3)
RBC # BLD: 4.33 M/UL — SIGNIFICANT CHANGE UP (ref 4.2–5.8)
RBC # FLD: 14.6 % — HIGH (ref 10.3–14.5)
SODIUM SERPL-SCNC: 139 MMOL/L — SIGNIFICANT CHANGE UP (ref 135–145)
SPECIMEN SOURCE: SIGNIFICANT CHANGE UP
WBC # BLD: 8.18 K/UL — SIGNIFICANT CHANGE UP (ref 3.8–10.5)
WBC # FLD AUTO: 8.18 K/UL — SIGNIFICANT CHANGE UP (ref 3.8–10.5)

## 2019-06-14 PROCEDURE — 71045 X-RAY EXAM CHEST 1 VIEW: CPT | Mod: 26

## 2019-06-14 PROCEDURE — 93010 ELECTROCARDIOGRAM REPORT: CPT

## 2019-06-14 PROCEDURE — 99285 EMERGENCY DEPT VISIT HI MDM: CPT

## 2019-06-14 RX ORDER — SODIUM CHLORIDE 9 MG/ML
3 INJECTION INTRAMUSCULAR; INTRAVENOUS; SUBCUTANEOUS ONCE
Refills: 0 | Status: COMPLETED | OUTPATIENT
Start: 2019-06-14 | End: 2019-06-14

## 2019-06-14 RX ORDER — SODIUM CHLORIDE 9 MG/ML
1000 INJECTION INTRAMUSCULAR; INTRAVENOUS; SUBCUTANEOUS ONCE
Refills: 0 | Status: COMPLETED | OUTPATIENT
Start: 2019-06-14 | End: 2019-06-14

## 2019-06-14 RX ORDER — MEROPENEM 1 G/30ML
1000 INJECTION INTRAVENOUS ONCE
Refills: 0 | Status: COMPLETED | OUTPATIENT
Start: 2019-06-14 | End: 2019-06-14

## 2019-06-14 RX ADMIN — SODIUM CHLORIDE 3 MILLILITER(S): 9 INJECTION INTRAMUSCULAR; INTRAVENOUS; SUBCUTANEOUS at 18:51

## 2019-06-14 RX ADMIN — MEROPENEM 100 MILLIGRAM(S): 1 INJECTION INTRAVENOUS at 18:58

## 2019-06-14 RX ADMIN — MEROPENEM 1000 MILLIGRAM(S): 1 INJECTION INTRAVENOUS at 19:28

## 2019-06-14 RX ADMIN — SODIUM CHLORIDE 1000 MILLILITER(S): 9 INJECTION INTRAMUSCULAR; INTRAVENOUS; SUBCUTANEOUS at 18:58

## 2019-06-14 RX ADMIN — SODIUM CHLORIDE 1000 MILLILITER(S): 9 INJECTION INTRAMUSCULAR; INTRAVENOUS; SUBCUTANEOUS at 19:58

## 2019-06-14 NOTE — ED ADULT TRIAGE NOTE - CHIEF COMPLAINT QUOTE
pt presents to ED with complaints of call back for ESBL in urine. pt was seen in  ED 2 days ago for peterson placement due to difficulty urinating. pt was called today due to + urine culture for ESBL and told to return to ED for abx change.

## 2019-06-14 NOTE — ED PROVIDER NOTE - CLINICAL SUMMARY MEDICAL DECISION MAKING FREE TEXT BOX
70 y/o  male with BPH ambulatory to ED per lab recall. Urine culture from ED visit two days ago regarding urine retentions requiring urine culturea and Garcia replacement. +ESBL resistant to all PO abx. Pt does not meet sepsis criteria.   Plan: Admission, EKG, CXR, CBC, CMP, Blood cultures, lactate, contact isolation, IV meropenum and admit to medicine service.

## 2019-06-14 NOTE — ED POST DISCHARGE NOTE - RESULT SUMMARY
Urine Cx with ESBL, resistant to Cipro.  Will call rob pfeiffer PA-C Urine Cx with ESBL, resistant to Cipro.  Pt with indwelling peterson catheter with urinary retention.  WAs scheduled to F?U with Dr. Fischer on 6/13.  Called Dr. mota's office to discuss results.  Office stated he will call back when he comes in.  DORI can PA-C

## 2019-06-14 NOTE — ED PROVIDER NOTE - OBJECTIVE STATEMENT
70 y/o male with a PMHx of BPH, HLD, HTN presents to the ED for lab recall. Recently in  2 days ago for urine retention and suprapubic discomfort. Pt's Garcia catheter was not draining. Garcia catheter was replaced with improvement of symptoms. Pt followed up with Dr. Pemberton yesterday. States Dr. Pemberton wants to have TURP procedure done but pt needs medical clearance first at the [facility]. Urine cultures found positive for ESBL. EDPA called urology office with no call back. Pt was contacted and advised to come to  1 hour ago for reevaluation. In ED, pt states symptoms have improved and he is comfortable. Denies fever, chills, decrease appetite, nausea, vomiting, diarrhea. JUNE. PMD: Dr. Salgado. Urologist: Dr. Pemberton. [employer]  used, ID#: [id number]

## 2019-06-14 NOTE — ED PROVIDER NOTE - MUSCULOSKELETAL, MLM
Spine appears normal, range of motion is not limited, no muscle or joint tenderness. Extremities normal.

## 2019-06-14 NOTE — ED PROVIDER NOTE - CARE PLAN
Principal Discharge DX:	Urinary tract infection associated with indwelling urethral catheter, subsequent encounter  Secondary Diagnosis:	ESBL (extended spectrum beta-lactamase) producing bacteria infection

## 2019-06-14 NOTE — ED POST DISCHARGE NOTE - DETAILS
Dr. Pemberton did not call back ED.  I called pt with [facility]  [phone] and [id number] to report Urine Cx results of E. coli ESBL, resistant to Cipro that pt was treated with.  Pt with peterson catheter.  Denies dysuria, frequency at this time.  Had urinary retention on 6/12.  Did not F?U with Dr. pemberton on 6/13.  Told to return to ED for ESBL resistant to oral Abx.  Will need IV Abx.  Pt. understands.  DORI can PA-C

## 2019-06-14 NOTE — ED ADULT NURSE NOTE - NS ED NURSE RECORD ANOTHER VITAL SIGN
Chief Complaint   Patient presents with    Sore Throat   c/o sore throat x 1 day,mom states school nurse called today stating child wouldn't eat lunch due to discomfort of throat, denies any fever at this time, was given ibuprofen at school to help with discomfort   This note will not be viewable in [address]
Please see scanned downtime form for H&P.
Yes

## 2019-06-14 NOTE — ED ADULT NURSE NOTE - OBJECTIVE STATEMENT
Pt sent here by urologist for IV abx. pt has a chronic peterson for urinary retention and discomfort and recently it was not draining. peterson was changed recently 6/13 at urologist. urine came back +ESBL and the po antibiotic will not treat it. pt currently has no flank pain and peterson draining clear yellow urine. denies fever or chills. after peterson was changed his discomfort has gone away. Urologist is asking for a TURP procedure, on note that wife brought in. Pt will be admitted

## 2019-06-14 NOTE — ED ADULT NURSE REASSESSMENT NOTE - NS ED NURSE REASSESS COMMENT FT1
Patient received from previous nurse. Pt is A&Ox4, VSS on RA. Pt is resting comfortably in their bed at this time, denies any pain or discomfort at this time. Safety and comfort measures in place. Hourly rounding will be done on my time. Will continue to monitor.
changed leg bag to large peterson bag
pt received at change of shift, pt assessed, pt in NAD, pt updated on plan of care, [employer] interpreters utilized ID number [id number]
Assumed care of patient from JANA Nazario at 2300. Patient AxOx4. Patient resting comfortably in bed. Patient denies pain, Pt South African speaking. Garcia catheter in place draining clear yellow urine. Patient in no acute signs of distress. All needs addressed at this time. Safety and comfort maintained. Will continue to monitor.

## 2019-06-15 DIAGNOSIS — S89.90XA UNSPECIFIED INJURY OF UNSPECIFIED LOWER LEG, INITIAL ENCOUNTER: Chronic | ICD-10-CM

## 2019-06-15 DIAGNOSIS — Z85.46 PERSONAL HISTORY OF MALIGNANT NEOPLASM OF PROSTATE: Chronic | ICD-10-CM

## 2019-06-15 LAB
ALBUMIN SERPL ELPH-MCNC: 3.1 G/DL — LOW (ref 3.3–5)
ALP SERPL-CCNC: 58 U/L — SIGNIFICANT CHANGE UP (ref 40–120)
ALT FLD-CCNC: 18 U/L — SIGNIFICANT CHANGE UP (ref 12–78)
ANION GAP SERPL CALC-SCNC: 7 MMOL/L — SIGNIFICANT CHANGE UP (ref 5–17)
AST SERPL-CCNC: 21 U/L — SIGNIFICANT CHANGE UP (ref 15–37)
BASOPHILS # BLD AUTO: 0.08 K/UL — SIGNIFICANT CHANGE UP (ref 0–0.2)
BASOPHILS NFR BLD AUTO: 1.2 % — SIGNIFICANT CHANGE UP (ref 0–2)
BILIRUB SERPL-MCNC: 0.4 MG/DL — SIGNIFICANT CHANGE UP (ref 0.2–1.2)
BUN SERPL-MCNC: 8 MG/DL — SIGNIFICANT CHANGE UP (ref 7–23)
CALCIUM SERPL-MCNC: 8.7 MG/DL — SIGNIFICANT CHANGE UP (ref 8.5–10.1)
CHLORIDE SERPL-SCNC: 106 MMOL/L — SIGNIFICANT CHANGE UP (ref 96–108)
CO2 SERPL-SCNC: 27 MMOL/L — SIGNIFICANT CHANGE UP (ref 22–31)
CREAT SERPL-MCNC: 0.83 MG/DL — SIGNIFICANT CHANGE UP (ref 0.5–1.3)
EOSINOPHIL # BLD AUTO: 0.33 K/UL — SIGNIFICANT CHANGE UP (ref 0–0.5)
EOSINOPHIL NFR BLD AUTO: 5 % — SIGNIFICANT CHANGE UP (ref 0–6)
GLUCOSE SERPL-MCNC: 79 MG/DL — SIGNIFICANT CHANGE UP (ref 70–99)
HCT VFR BLD CALC: 35.4 % — LOW (ref 39–50)
HGB BLD-MCNC: 11.4 G/DL — LOW (ref 13–17)
IMM GRANULOCYTES NFR BLD AUTO: 0.3 % — SIGNIFICANT CHANGE UP (ref 0–1.5)
LYMPHOCYTES # BLD AUTO: 1.85 K/UL — SIGNIFICANT CHANGE UP (ref 1–3.3)
LYMPHOCYTES # BLD AUTO: 27.9 % — SIGNIFICANT CHANGE UP (ref 13–44)
MAGNESIUM SERPL-MCNC: 1.9 MG/DL — SIGNIFICANT CHANGE UP (ref 1.6–2.6)
MCHC RBC-ENTMCNC: 27.7 PG — SIGNIFICANT CHANGE UP (ref 27–34)
MCHC RBC-ENTMCNC: 32.2 GM/DL — SIGNIFICANT CHANGE UP (ref 32–36)
MCV RBC AUTO: 86.1 FL — SIGNIFICANT CHANGE UP (ref 80–100)
MONOCYTES # BLD AUTO: 0.53 K/UL — SIGNIFICANT CHANGE UP (ref 0–0.9)
MONOCYTES NFR BLD AUTO: 8 % — SIGNIFICANT CHANGE UP (ref 2–14)
NEUTROPHILS # BLD AUTO: 3.82 K/UL — SIGNIFICANT CHANGE UP (ref 1.8–7.4)
NEUTROPHILS NFR BLD AUTO: 57.6 % — SIGNIFICANT CHANGE UP (ref 43–77)
PLATELET # BLD AUTO: 403 K/UL — HIGH (ref 150–400)
POTASSIUM SERPL-MCNC: 3.6 MMOL/L — SIGNIFICANT CHANGE UP (ref 3.5–5.3)
POTASSIUM SERPL-SCNC: 3.6 MMOL/L — SIGNIFICANT CHANGE UP (ref 3.5–5.3)
PROT SERPL-MCNC: 6.9 GM/DL — SIGNIFICANT CHANGE UP (ref 6–8.3)
RBC # BLD: 4.11 M/UL — LOW (ref 4.2–5.8)
RBC # FLD: 14.7 % — HIGH (ref 10.3–14.5)
SODIUM SERPL-SCNC: 140 MMOL/L — SIGNIFICANT CHANGE UP (ref 135–145)
WBC # BLD: 6.63 K/UL — SIGNIFICANT CHANGE UP (ref 3.8–10.5)
WBC # FLD AUTO: 6.63 K/UL — SIGNIFICANT CHANGE UP (ref 3.8–10.5)

## 2019-06-15 RX ORDER — ONDANSETRON 8 MG/1
4 TABLET, FILM COATED ORAL EVERY 6 HOURS
Refills: 0 | Status: DISCONTINUED | OUTPATIENT
Start: 2019-06-15 | End: 2019-06-19

## 2019-06-15 RX ORDER — FINASTERIDE 5 MG/1
5 TABLET, FILM COATED ORAL DAILY
Refills: 0 | Status: DISCONTINUED | OUTPATIENT
Start: 2019-06-15 | End: 2019-06-19

## 2019-06-15 RX ORDER — METOPROLOL TARTRATE 50 MG
50 TABLET ORAL
Refills: 0 | Status: DISCONTINUED | OUTPATIENT
Start: 2019-06-15 | End: 2019-06-19

## 2019-06-15 RX ORDER — SODIUM CHLORIDE 9 MG/ML
1000 INJECTION INTRAMUSCULAR; INTRAVENOUS; SUBCUTANEOUS
Refills: 0 | Status: COMPLETED | OUTPATIENT
Start: 2019-06-15 | End: 2019-06-15

## 2019-06-15 RX ORDER — SENNA PLUS 8.6 MG/1
2 TABLET ORAL AT BEDTIME
Refills: 0 | Status: DISCONTINUED | OUTPATIENT
Start: 2019-06-15 | End: 2019-06-19

## 2019-06-15 RX ORDER — TAMSULOSIN HYDROCHLORIDE 0.4 MG/1
0.4 CAPSULE ORAL AT BEDTIME
Refills: 0 | Status: DISCONTINUED | OUTPATIENT
Start: 2019-06-15 | End: 2019-06-19

## 2019-06-15 RX ORDER — DOCUSATE SODIUM 100 MG
100 CAPSULE ORAL THREE TIMES A DAY
Refills: 0 | Status: DISCONTINUED | OUTPATIENT
Start: 2019-06-15 | End: 2019-06-19

## 2019-06-15 RX ORDER — OXYCODONE HYDROCHLORIDE 5 MG/1
5 TABLET ORAL EVERY 6 HOURS
Refills: 0 | Status: DISCONTINUED | OUTPATIENT
Start: 2019-06-15 | End: 2019-06-19

## 2019-06-15 RX ORDER — AMLODIPINE BESYLATE 2.5 MG/1
10 TABLET ORAL DAILY
Refills: 0 | Status: DISCONTINUED | OUTPATIENT
Start: 2019-06-15 | End: 2019-06-19

## 2019-06-15 RX ORDER — ATORVASTATIN CALCIUM 80 MG/1
10 TABLET, FILM COATED ORAL AT BEDTIME
Refills: 0 | Status: DISCONTINUED | OUTPATIENT
Start: 2019-06-15 | End: 2019-06-19

## 2019-06-15 RX ORDER — MEROPENEM 1 G/30ML
1000 INJECTION INTRAVENOUS EVERY 8 HOURS
Refills: 0 | Status: DISCONTINUED | OUTPATIENT
Start: 2019-06-15 | End: 2019-06-19

## 2019-06-15 RX ADMIN — MEROPENEM 100 MILLIGRAM(S): 1 INJECTION INTRAVENOUS at 13:14

## 2019-06-15 RX ADMIN — AMLODIPINE BESYLATE 10 MILLIGRAM(S): 2.5 TABLET ORAL at 05:48

## 2019-06-15 RX ADMIN — MEROPENEM 100 MILLIGRAM(S): 1 INJECTION INTRAVENOUS at 05:47

## 2019-06-15 RX ADMIN — SENNA PLUS 2 TABLET(S): 8.6 TABLET ORAL at 20:45

## 2019-06-15 RX ADMIN — OXYCODONE HYDROCHLORIDE 5 MILLIGRAM(S): 5 TABLET ORAL at 18:16

## 2019-06-15 RX ADMIN — Medication 100 MILLIGRAM(S): at 20:44

## 2019-06-15 RX ADMIN — SODIUM CHLORIDE 75 MILLILITER(S): 9 INJECTION INTRAMUSCULAR; INTRAVENOUS; SUBCUTANEOUS at 04:09

## 2019-06-15 RX ADMIN — MEROPENEM 100 MILLIGRAM(S): 1 INJECTION INTRAVENOUS at 20:41

## 2019-06-15 RX ADMIN — FINASTERIDE 5 MILLIGRAM(S): 5 TABLET, FILM COATED ORAL at 11:19

## 2019-06-15 RX ADMIN — Medication 50 MILLIGRAM(S): at 05:48

## 2019-06-15 RX ADMIN — ATORVASTATIN CALCIUM 10 MILLIGRAM(S): 80 TABLET, FILM COATED ORAL at 20:45

## 2019-06-15 RX ADMIN — Medication 50 MILLIGRAM(S): at 17:31

## 2019-06-15 RX ADMIN — TAMSULOSIN HYDROCHLORIDE 0.4 MILLIGRAM(S): 0.4 CAPSULE ORAL at 20:45

## 2019-06-15 RX ADMIN — OXYCODONE HYDROCHLORIDE 5 MILLIGRAM(S): 5 TABLET ORAL at 19:00

## 2019-06-15 NOTE — PATIENT PROFILE ADULT - NSPROMUTANXFEARFT_GEN_A_NUR
anxious about Garcia catheter, pt wants to get better and is concerned about his urinary retention.  Communication

## 2019-06-15 NOTE — H&P ADULT - NSHPPHYSICALEXAM_GEN_ALL_CORE
Vital Signs Last 24 Hrs  T(C): 36.9 (15 Rufino 2019 00:57), Max: 36.9 (15 Rufino 2019 00:57)  T(F): 98.5 (15 Rufino 2019 00:57), Max: 98.5 (15 Rufino 2019 00:57)  HR: 85 (15 Rufino 2019 00:57) (80 - 85)  BP: 138/84 (15 Rufino 2019 00:57) (121/77 - 138/84)  RR: 17 (15 Rufino 2019 00:57) (16 - 17)  SpO2: 94% (15 Rufino 2019 00:57) (94% - 98%)

## 2019-06-15 NOTE — H&P ADULT - NSICDXPASTMEDICALHX_GEN_ALL_CORE_FT
PAST MEDICAL HISTORY:  BPH (benign prostatic hyperplasia)     HLD (hyperlipidemia)     Hypertension, unspecified type

## 2019-06-15 NOTE — PATIENT PROFILE ADULT - VISION (WITH CORRECTIVE LENSES IF THE PATIENT USUALLY WEARS THEM):
Partially impaired: cannot see medication labels or newsprint, but can see obstacles in path, and the surrounding layout; can count fingers at arm's length/needs eyeglasses for distance mainly

## 2019-06-15 NOTE — H&P ADULT - HISTORY OF PRESENT ILLNESS
68 y/o M PMHx significant for hypertension, hyperlipidemia, BPH, presents to the ED after reportedly being notified concerning an abnormal lab result. The patient was evaluated at  2 days prior to admission for urine retention and suprapubic discomfort. The patient's Garcia catheter was changed as was deemed non-functional at that time. Urine C+S were found to be positive for ESBL. Upon arrival the patient denied any further symptoms of subjective fevers, chills, nausea, or vomiting. As noted the patient is being evaluated by Urology for possible elective TURP. 70 y/o M PMHx significant for hypertension, hyperlipidemia, BPH, presents to the ED after reportedly being notified concerning an abnormal lab result. The patient was evaluated at  2 days prior to admission for urine retention and suprapubic discomfort. The patient's Garcia catheter was changed as was deemed non-functional at that time. Urine C+S were found to be positive for ESBL. Upon arrival the patient denied any further symptoms of subjective fevers, chills, nausea, or vomiting. As noted the patient is being evaluated by Urology for possible elective TURP. In the ED the patient received Meropenem 1g IVPB x 1, and NS x 1L.

## 2019-06-15 NOTE — CONSULT NOTE ADULT - SUBJECTIVE AND OBJECTIVE BOX
Patient is a 69y old  Male who presents with a chief complaint of Suprapubic pain (15 Rufino 2019 02:58)    HPI:  68 y/o M PMHx significant for hypertension, hyperlipidemia, BPH, presents to the ED after reportedly being notified concerning an abnormal lab result. The patient was evaluated at  2 days prior to admission for urine retention and suprapubic discomfort. The patient's Peterson catheter was changed as was deemed non-functional at that time. Urine C+S were found to be positive for ESBL. Upon arrival the patient denied any further symptoms of subjective fevers, chills, nausea, or vomiting. As noted the patient is being evaluated by Urology for possible elective TURP. In the ED the patient received Meropenem 1g IVPB x 1, and NS x 1L.       PMH: as above  PSH: as above  Meds: per reconciliation sheet, noted below  MEDICATIONS  (STANDING):  amLODIPine   Tablet 10 milliGRAM(s) Oral daily  atorvastatin 10 milliGRAM(s) Oral at bedtime  finasteride 5 milliGRAM(s) Oral daily  meropenem  IVPB 1000 milliGRAM(s) IV Intermittent every 8 hours  metoprolol tartrate 50 milliGRAM(s) Oral two times a day  tamsulosin 0.4 milliGRAM(s) Oral at bedtime    Allergies    No Known Allergies    Intolerances      Social: no smoking, no alcohol, no illegal drugs; no recent travel, no exposure to TB  FAMILY HISTORY:  Family hx of prostate cancer (Father)  Family history of uterine cancer (Mother)     no history of premature cardiovascular disease in first degree relatives    ROS: the patient denies fever, no chills, no HA, no dizziness, no sore throat, no blurry vision, no CP, no palpitations, no sob, no cough, no abdominal pain, no diarrhea,  no leg pain, no claudication, no rash, no joint aches, no rectal pain or bleeding, no night sweats  All other systems reviewed and are negative    Vital Signs Last 24 Hrs  T(C): 36.9 (15 Rufino 2019 05:45), Max: 36.9 (15 Rufino 2019 00:57)  T(F): 98.4 (15 Rufino 2019 05:45), Max: 98.5 (15 Rufino 2019 00:57)  HR: 83 (15 Rufino 2019 05:45) (80 - 85)  BP: 137/75 (15 Rufino 2019 05:45) (121/77 - 138/84)  BP(mean): --  RR: 17 (15 Rufino 2019 05:45) (16 - 17)  SpO2: 94% (15 Rufino 2019 05:45) (94% - 98%)  Daily Height in cm: 157.48 (14 Jun 2019 17:49)    Daily     PE:  Constitutional: frail looking  HEENT: NC/AT, EOMI, PERRLA, conjunctivae clear; ears and nose atraumatic; pharynx benign  Neck: supple; thyroid not palpable  Back: no tenderness  Respiratory: respiratory effort normal; clear to auscultation  Cardiovascular: S1S2 regular, no murmurs  Abdomen: soft, not tender, not distended, positive BS; liver and spleen WNL  Genitourinary: no suprapubic tenderness, peterson in place   Lymphatic: no LN palpable  Musculoskeletal: no muscle tenderness, no joint swelling or tenderness  Extremities: no pedal edema  Neurological/ Psychiatric:  moving all extremities  Skin: no rashes; no palpable lesions    Labs: all available labs reviewed                        11.4   6.63  )-----------( 403      ( 15 Rufino 2019 06:23 )             35.4     06-15    140  |  106  |  8   ----------------------------<  79  3.6   |  27  |  0.83    Ca    8.7      15 Rufino 2019 06:23  Mg     1.9     06-15    TPro  6.9  /  Alb  3.1<L>  /  TBili  0.4  /  DBili  x   /  AST  21  /  ALT  18  /  AlkPhos  58  06-15     LIVER FUNCTIONS - ( 15 Rufino 2019 06:23 )  Alb: 3.1 g/dL / Pro: 6.9 gm/dL / ALK PHOS: 58 U/L / ALT: 18 U/L / AST: 21 U/L / GGT: x           Culture - Urine (06.12.19 @ 00:04)    -  Amikacin: S <=16    -  Ampicillin: R >16 These ampicillin results predict results for amoxicillin    -  Ampicillin/Sulbactam: R <=8/4    -  Aztreonam: R 16    -  Cefazolin: R >16 For uncomplicated UTI with K. pneumoniae, E. coli, or P. mirablis: LINUS <=16 is sensitive and LINUS >=32 is resistant. This also predicts results for oral agents cefaclor, cefdinir, cefpodoxime, cefprozil, cefuroxime axetil, cephalexin and locarbef for uncomplicated UTI. Note that some isolates may be susceptible to these agents while testing resistant to cefazolin.    -  Cefepime: R <=4    -  Cefoxitin: S <=8    -  Ceftriaxone: R >32 Enterobacter, Citrobacter, and Serratia may develop resistance during prolonged therapy    -  Ciprofloxacin: R >2    -  Ertapenem: S <=1    -  Gentamicin: S <=4    -  Imipenem: S <=1    -  Levofloxacin: R >4    -  Meropenem: S <=1    -  Nitrofurantoin: R >64 Should not be used to treat pyelonephritis    -  Piperacillin/Tazobactam: R <=16    -  Tigecycline: S <=2    -  Tobramycin: S <=4    -  Trimethoprim/Sulfamethoxazole: R >2/38    Specimen Source: .Urine None    Culture Results:   >100,000 CFU/ml Escherichia coli ESBL    Organism Identification: Escherichia coli ESBL    Organism: Escherichia coli ESBL    Method Type: LINUS          Radiology: all available radiological tests reviewed    EXAM:  XR CHEST PORTABLE URGENT 1V                            PROCEDURE DATE:  06/14/2019          INTERPRETATION:  History: Dyspnea    Chest:  one view.      Comparison: 03/26/2019    AP radiograph of the chest demonstrates no evidence of infiltrate,   pleural effusion or vascular congestion. No atelectasis is seen. The   cardiac silhouette is normal in size. Osseous structures are intact.    Impression: No active pulmonary disease.    < end of copied text >    Advanced directives addressed: full resuscitation

## 2019-06-15 NOTE — CONSULT NOTE ADULT - ASSESSMENT
70 y/o M PMHx significant for hypertension, hyperlipidemia, BPH, presents to the ED after reportedly being notified concerning an abnormal lab result. The patient was evaluated at  2 days prior to admission for urine retention and suprapubic discomfort. The patient's Peterson catheter was changed as was deemed non-functional at that time. Urine C+S were found to be positive for ESBL. Upon arrival the patient denied any further symptoms of subjective fevers, chills, nausea, or vomiting. As noted the patient is being evaluated by Urology for possible elective TURP. In the ED the patient received Meropenem 1g IVPB x 1, and NS x 1L.     1. ESBL Ecoli cystitis. BPH. urinary retention  - agree with meropenem [id number]  - urology eval  - f/u cultures  - monitor temps  - peterson changed 6/13   - tolerating abx well so far; no side effects noted  - reason for abx use and side effects reviewed with patient  - supportive care  - fu cbc    2. other issues - care per medicine

## 2019-06-15 NOTE — H&P ADULT - NSICDXFAMILYHX_GEN_ALL_CORE_FT
FAMILY HISTORY:  No pertinent family history in first degree relatives FAMILY HISTORY:  Father  Still living? No  Family hx of prostate cancer, Age at diagnosis: Age Unknown    Mother  Still living? No  Family history of uterine cancer, Age at diagnosis: Age Unknown

## 2019-06-15 NOTE — H&P ADULT - ASSESSMENT
68 y/o M PMHx significant for hypertension, hyperlipidemia, BPH, presents to the ED after reportedly being notified concerning an abnormal lab result. The patient was evaluated at  2 days prior to admission for urine retention and suprapubic discomfort. The patient's Garcia catheter was changed as was deemed non-functional at that time. Urine C+S were found to be positive for ESBL. Upon arrival the patient denied any further symptoms of subjective fevers, chills, nausea, or vomiting. As noted the patient is being evaluated by Urology for possible elective TURP.    #Acute Complicated Cystitis due to a MDRO 68 y/o M PMHx significant for hypertension, hyperlipidemia, BPH, presents to the ED after reportedly being notified concerning an abnormal lab result. The patient was evaluated at  2 days prior to admission for urine retention and suprapubic discomfort. The patient's Garcia catheter was changed as was deemed non-functional at that time. Urine C+S were found to be positive for ESBL. Upon arrival the patient denied any further symptoms of subjective fevers, chills, nausea, or vomiting. As noted the patient is being evaluated by Urology for possible elective TURP. In the ED the patient received Meropenem 1g IVPB x 1, and NS x 1L.    #Acute Complicated Cystitis due to a MDRO  ~admit to Medicine  ~f/u PAN C+S  ~cont. IV hydration  ~will cont. Meropenem for now   ~f/u formal ID consultation in the am  ~cont. contact isolation  ~strict I/Os    #BPH  ~cont. Garcia  ~cont. Tamsulosin 0.4mg po qhs  ~cont. Finasteride 5mg po daily    #Hypertension  ~cont. Metoprolol 50mg po q12h    #Hyperlipidemia  ~cont. statin therapy w/ Atorvastatin 10mg po qhs    #Vte ppx  ~IMPROVE VTE Risk Score is 1  [  ] Previous VTE                                                  3  [  ] Thrombophilia                                               2  [  ] Lower limb paralysis                                      2        (unable to hold up >15 seconds)    [  ] Current Cancer                                              2         (within 6 months)  [  ] Immobilization > 24 hrs                                1  [  ] ICU/CCU stay > 24 hours                              1  [X] Age > 60                                                      1  ~cont. SCDs for now    **Note; please confirm this patient's medication reconciliation with Pharmacy in the am. As per Dr. Medley the patient has had refills since 4/2.**

## 2019-06-16 RX ADMIN — MEROPENEM 100 MILLIGRAM(S): 1 INJECTION INTRAVENOUS at 06:00

## 2019-06-16 RX ADMIN — FINASTERIDE 5 MILLIGRAM(S): 5 TABLET, FILM COATED ORAL at 11:35

## 2019-06-16 RX ADMIN — AMLODIPINE BESYLATE 10 MILLIGRAM(S): 2.5 TABLET ORAL at 06:00

## 2019-06-16 RX ADMIN — Medication 50 MILLIGRAM(S): at 06:00

## 2019-06-16 RX ADMIN — TAMSULOSIN HYDROCHLORIDE 0.4 MILLIGRAM(S): 0.4 CAPSULE ORAL at 21:17

## 2019-06-16 RX ADMIN — ATORVASTATIN CALCIUM 10 MILLIGRAM(S): 80 TABLET, FILM COATED ORAL at 21:17

## 2019-06-16 RX ADMIN — MEROPENEM 100 MILLIGRAM(S): 1 INJECTION INTRAVENOUS at 21:17

## 2019-06-16 RX ADMIN — Medication 50 MILLIGRAM(S): at 17:28

## 2019-06-16 RX ADMIN — MEROPENEM 100 MILLIGRAM(S): 1 INJECTION INTRAVENOUS at 13:00

## 2019-06-17 PROCEDURE — 73120 X-RAY EXAM OF HAND: CPT | Mod: 26,LT

## 2019-06-17 RX ADMIN — ATORVASTATIN CALCIUM 10 MILLIGRAM(S): 80 TABLET, FILM COATED ORAL at 22:50

## 2019-06-17 RX ADMIN — MEROPENEM 100 MILLIGRAM(S): 1 INJECTION INTRAVENOUS at 15:38

## 2019-06-17 RX ADMIN — MEROPENEM 100 MILLIGRAM(S): 1 INJECTION INTRAVENOUS at 22:50

## 2019-06-17 RX ADMIN — OXYCODONE HYDROCHLORIDE 5 MILLIGRAM(S): 5 TABLET ORAL at 07:19

## 2019-06-17 RX ADMIN — FINASTERIDE 5 MILLIGRAM(S): 5 TABLET, FILM COATED ORAL at 11:00

## 2019-06-17 RX ADMIN — Medication 50 MILLIGRAM(S): at 18:32

## 2019-06-17 RX ADMIN — Medication 50 MILLIGRAM(S): at 06:41

## 2019-06-17 RX ADMIN — OXYCODONE HYDROCHLORIDE 5 MILLIGRAM(S): 5 TABLET ORAL at 06:49

## 2019-06-17 RX ADMIN — OXYCODONE HYDROCHLORIDE 5 MILLIGRAM(S): 5 TABLET ORAL at 12:53

## 2019-06-17 RX ADMIN — MEROPENEM 100 MILLIGRAM(S): 1 INJECTION INTRAVENOUS at 06:41

## 2019-06-17 RX ADMIN — AMLODIPINE BESYLATE 10 MILLIGRAM(S): 2.5 TABLET ORAL at 06:41

## 2019-06-17 RX ADMIN — TAMSULOSIN HYDROCHLORIDE 0.4 MILLIGRAM(S): 0.4 CAPSULE ORAL at 22:50

## 2019-06-17 NOTE — PROGRESS NOTE ADULT - SUBJECTIVE AND OBJECTIVE BOX
HOSPITALIST ATTENDING PROGRESS NOTE    Chart and meds reviewed.  Patient seen and examined.    HPI: 68 y/o M PMHx significant for hypertension, hyperlipidemia, BPH, presents to the ED after reportedly being notified concerning an abnormal lab result. The patient was evaluated at  2 days prior to admission for urine retention and suprapubic discomfort. The patient's Peterson catheter was changed as was deemed non-functional at that time. Urine C+S were found to be positive for ESBL. Upon arrival the patient denied any further symptoms of subjective fevers, chills, nausea, or vomiting. As noted the patient is being evaluated by Urology for possible elective TURP. In the ED the patient received Meropenem 1g IVPB x 1, and NS x 1L.    6/17/19 pt seen and examined. [employer]  ID [id number] used. Discussed the case and plan. He verbalized understanding. His concern is the peterson and chronic use of it. He sees urology at the [facility]. He can not recall the name.    All 10 systems reviewed and found to be negative with the exception of what has been described above.    MEDICATIONS  (STANDING):  amLODIPine   Tablet 10 milliGRAM(s) Oral daily  atorvastatin 10 milliGRAM(s) Oral at bedtime  finasteride 5 milliGRAM(s) Oral daily  meropenem  IVPB 1000 milliGRAM(s) IV Intermittent every 8 hours  metoprolol tartrate 50 milliGRAM(s) Oral two times a day  tamsulosin 0.4 milliGRAM(s) Oral at bedtime    MEDICATIONS  (PRN):  docusate sodium 100 milliGRAM(s) Oral three times a day PRN Constipation  ondansetron Injectable 4 milliGRAM(s) IV Push every 6 hours PRN Nausea  oxyCODONE    IR 5 milliGRAM(s) Oral every 6 hours PRN Moderate Pain (4 - 6)  senna 2 Tablet(s) Oral at bedtime PRN Constipation      VITALS:  T(F): 99.1 (06-17-19 @ 11:16), Max: 99.1 (06-17-19 @ 11:16)  HR: 77 (06-17-19 @ 11:16) (77 - 86)  BP: 120/74 (06-17-19 @ 11:16) (120/74 - 139/82)  RR: 16 (06-17-19 @ 11:16) (16 - 18)  SpO2: 98% (06-17-19 @ 11:16) (96% - 98%)  Wt(kg): --    I&O's Summary    16 Jun 2019 07:01  -  17 Jun 2019 07:00  --------------------------------------------------------  IN: 490 mL / OUT: 1350 mL / NET: -860 mL    17 Jun 2019 07:01  -  17 Jun 2019 14:57  --------------------------------------------------------  IN: 360 mL / OUT: 2700 mL / NET: -2340 mL        CAPILLARY BLOOD GLUCOSE          PHYSICAL EXAM:    HEENT:  pupils equal and reactive, EOMI, no oropharyngeal lesions, erythema, exudates, oral thrush  NECK:   supple, no carotid bruits, no palpable lymph nodes, no thyromegaly  CV:  +S1, +S2, regular, no murmurs or rubs  RESP:   lungs clear to auscultation bilaterally, no wheezing, rales, rhonchi, good air entry bilaterally  BREAST:  not examined  GI:  abdomen soft, non-tender, non-distended, normal BS, no bruits, no abdominal masses, no palpable masses  RECTAL:  not examined  :  not examined  MSK:   normal muscle tone, no atrophy, no rigidity, no contractions  EXT:  no clubbing, no cyanosis, no edema, no calf pain, swelling or erythema  VASCULAR:  pulses equal and symmetric in the upper and lower extremities  NEURO:  AAOX3, no focal neurological deficits, follows all commands, able to move extremities spontaneously  SKIN:  no ulcers, lesions or rashes    LABS:                                                      CULTURES:

## 2019-06-17 NOTE — PROGRESS NOTE ADULT - ASSESSMENT
1. Acute Complicated Cystitis due to a ESBL UTI    ~f/u PAN C+S  ~will cont. Meropenem for now day#3  ~ ID consultation appreciated  ~cont. contact isolation  ~strict I/Os    2. BPH  ~cont. Garcia  ~cont. Tamsulosin 0.4mg po qhs  ~cont. Finasteride 5mg po daily  outpatient  follow up    3. Hypertension  ~cont. Metoprolol 50mg po q12h    4. Hyperlipidemia  ~cont. statin therapy w/ Atorvastatin 10mg po qhs    5. L hand injury: Xray with no fracture or dislocation.

## 2019-06-17 NOTE — PROGRESS NOTE ADULT - SUBJECTIVE AND OBJECTIVE BOX
Date of service: 06-17-19 @ 12:56    pt seen and examined  no complaints  afebrile    ROS: no fever or chills; denies dizziness, no HA, no SOB or cough, no abdominal pain, no diarrhea or constipation;  no legs pain, no rashes    MEDICATIONS  (STANDING):  amLODIPine   Tablet 10 milliGRAM(s) Oral daily  atorvastatin 10 milliGRAM(s) Oral at bedtime  finasteride 5 milliGRAM(s) Oral daily  meropenem  IVPB 1000 milliGRAM(s) IV Intermittent every 8 hours  metoprolol tartrate 50 milliGRAM(s) Oral two times a day  tamsulosin 0.4 milliGRAM(s) Oral at bedtime    Vital Signs Last 24 Hrs  T(C): 37.3 (17 Jun 2019 11:16), Max: 37.3 (17 Jun 2019 11:16)  T(F): 99.1 (17 Jun 2019 11:16), Max: 99.1 (17 Jun 2019 11:16)  HR: 77 (17 Jun 2019 11:16) (77 - 86)  BP: 120/74 (17 Jun 2019 11:16) (120/74 - 139/82)  BP(mean): --  RR: 16 (17 Jun 2019 11:16) (16 - 18)  SpO2: 98% (17 Jun 2019 11:16) (96% - 98%)        PE:  Constitutional: frail looking  HEENT: NC/AT, EOMI, PERRLA, conjunctivae clear; ears and nose atraumatic; pharynx benign  Neck: supple; thyroid not palpable  Back: no tenderness  Respiratory: respiratory effort normal; clear to auscultation  Cardiovascular: S1S2 regular, no murmurs  Abdomen: soft, not tender, not distended, positive BS; liver and spleen WNL  Genitourinary: no suprapubic tenderness, peterson in place   Lymphatic: no LN palpable  Musculoskeletal: no muscle tenderness, no joint swelling or tenderness  Extremities: no pedal edema  Neurological/ Psychiatric:  moving all extremities  Skin: no rashes; no palpable lesions    Labs: all available labs reviewed                 LIVER FUNCTIONS - ( 15 Rufino 2019 06:23 )  Alb: 3.1 g/dL / Pro: 6.9 gm/dL / ALK PHOS: 58 U/L / ALT: 18 U/L / AST: 21 U/L / GGT: x           Culture - Urine (06.12.19 @ 00:04)    -  Amikacin: S <=16    -  Ampicillin: R >16 These ampicillin results predict results for amoxicillin    -  Ampicillin/Sulbactam: R <=8/4    -  Aztreonam: R 16    -  Cefazolin: R >16 For uncomplicated UTI with K. pneumoniae, E. coli, or P. mirablis: LINUS <=16 is sensitive and LINUS >=32 is resistant. This also predicts results for oral agents cefaclor, cefdinir, cefpodoxime, cefprozil, cefuroxime axetil, cephalexin and locarbef for uncomplicated UTI. Note that some isolates may be susceptible to these agents while testing resistant to cefazolin.    -  Cefepime: R <=4    -  Cefoxitin: S <=8    -  Ceftriaxone: R >32 Enterobacter, Citrobacter, and Serratia may develop resistance during prolonged therapy    -  Ciprofloxacin: R >2    -  Ertapenem: S <=1    -  Gentamicin: S <=4    -  Imipenem: S <=1    -  Levofloxacin: R >4    -  Meropenem: S <=1    -  Nitrofurantoin: R >64 Should not be used to treat pyelonephritis    -  Piperacillin/Tazobactam: R <=16    -  Tigecycline: S <=2    -  Tobramycin: S <=4    -  Trimethoprim/Sulfamethoxazole: R >2/38    Specimen Source: .Urine None    Culture Results:   >100,000 CFU/ml Escherichia coli ESBL    Organism Identification: Escherichia coli ESBL    Organism: Escherichia coli ESBL    Method Type: LINUS      Culture - Blood (06.14.19 @ 18:43)    Specimen Source: .Blood None    Culture Results:   No growth to date.        Radiology: all available radiological tests reviewed    EXAM:  XR CHEST PORTABLE URGENT 1V                            PROCEDURE DATE:  06/14/2019          INTERPRETATION:  History: Dyspnea    Chest:  one view.      Comparison: 03/26/2019    AP radiograph of the chest demonstrates no evidence of infiltrate,   pleural effusion or vascular congestion. No atelectasis is seen. The   cardiac silhouette is normal in size. Osseous structures are intact.    Impression: No active pulmonary disease.    < end of copied text >    Advanced directives addressed: full resuscitation

## 2019-06-17 NOTE — PROGRESS NOTE ADULT - ASSESSMENT
70 y/o M PMHx significant for hypertension, hyperlipidemia, BPH, presents to the ED after reportedly being notified concerning an abnormal lab result. The patient was evaluated at  2 days prior to admission for urine retention and suprapubic discomfort. The patient's Peterson catheter was changed as was deemed non-functional at that time. Urine C+S were found to be positive for ESBL. Upon arrival the patient denied any further symptoms of subjective fevers, chills, nausea, or vomiting. As noted the patient is being evaluated by Urology for possible elective TURP. In the ED the patient received Meropenem 1g IVPB x 1, and NS x 1L.     1. ESBL Ecoli cystitis. BPH. urinary retention  - on meropenem 1gmq8h #3  - continue with abx coverage   - plan for 5-7 day course abx  - blood cx no growth  - monitor temps  - peterson changed 6/13   - tolerating abx well so far; no side effects noted  - reason for abx use and side effects reviewed with patient  - supportive care  - fu cbc    2. other issues - care per medicine

## 2019-06-17 NOTE — CDI QUERY NOTE - NSCDIOTHERTXTBX_GEN_ALL_CORE_HH
Patient admitted 1. Acute Complicated Cystitis due to a ESBL UTI  On  Meropenem IV  Patient has chronic peterson due to urinary retention,  it was changed 2 days prior to admit    Per ER documentation : Principal Discharge Dx Urinary tract infection associated with indwelling urethral catheter,    Please clarify if the UTI/ Cystitis is due to or related to the chronic peterson catheter  a) Urinary tract infection associated with indwelling urethral catheter,  b) UTI due to chronic peterson catheter  c) UTI not related to the chronic peterson  d) Unable to determine a cause and effect relationship between the UTI and chronic peterson catheter  e) Other , please clarify

## 2019-06-18 DIAGNOSIS — N40.1 BENIGN PROSTATIC HYPERPLASIA WITH LOWER URINARY TRACT SYMPTOMS: ICD-10-CM

## 2019-06-18 DIAGNOSIS — A49.9 BACTERIAL INFECTION, UNSPECIFIED: ICD-10-CM

## 2019-06-18 DIAGNOSIS — Z71.89 OTHER SPECIFIED COUNSELING: ICD-10-CM

## 2019-06-18 DIAGNOSIS — T83.511D INFECTION AND INFLAMMATORY REACTION DUE TO INDWELLING URETHRAL CATHETER, SUBSEQUENT ENCOUNTER: ICD-10-CM

## 2019-06-18 DIAGNOSIS — R33.9 RETENTION OF URINE, UNSPECIFIED: ICD-10-CM

## 2019-06-18 LAB
ANION GAP SERPL CALC-SCNC: 8 MMOL/L — SIGNIFICANT CHANGE UP (ref 5–17)
BUN SERPL-MCNC: 11 MG/DL — SIGNIFICANT CHANGE UP (ref 7–23)
CALCIUM SERPL-MCNC: 9.1 MG/DL — SIGNIFICANT CHANGE UP (ref 8.5–10.1)
CHLORIDE SERPL-SCNC: 103 MMOL/L — SIGNIFICANT CHANGE UP (ref 96–108)
CO2 SERPL-SCNC: 26 MMOL/L — SIGNIFICANT CHANGE UP (ref 22–31)
CREAT SERPL-MCNC: 0.92 MG/DL — SIGNIFICANT CHANGE UP (ref 0.5–1.3)
GLUCOSE SERPL-MCNC: 92 MG/DL — SIGNIFICANT CHANGE UP (ref 70–99)
POTASSIUM SERPL-MCNC: 3.7 MMOL/L — SIGNIFICANT CHANGE UP (ref 3.5–5.3)
POTASSIUM SERPL-SCNC: 3.7 MMOL/L — SIGNIFICANT CHANGE UP (ref 3.5–5.3)
SODIUM SERPL-SCNC: 137 MMOL/L — SIGNIFICANT CHANGE UP (ref 135–145)

## 2019-06-18 PROCEDURE — 99221 1ST HOSP IP/OBS SF/LOW 40: CPT

## 2019-06-18 RX ADMIN — OXYCODONE HYDROCHLORIDE 5 MILLIGRAM(S): 5 TABLET ORAL at 05:15

## 2019-06-18 RX ADMIN — OXYCODONE HYDROCHLORIDE 5 MILLIGRAM(S): 5 TABLET ORAL at 22:13

## 2019-06-18 RX ADMIN — OXYCODONE HYDROCHLORIDE 5 MILLIGRAM(S): 5 TABLET ORAL at 23:57

## 2019-06-18 RX ADMIN — MEROPENEM 100 MILLIGRAM(S): 1 INJECTION INTRAVENOUS at 05:07

## 2019-06-18 RX ADMIN — FINASTERIDE 5 MILLIGRAM(S): 5 TABLET, FILM COATED ORAL at 12:12

## 2019-06-18 RX ADMIN — OXYCODONE HYDROCHLORIDE 5 MILLIGRAM(S): 5 TABLET ORAL at 05:45

## 2019-06-18 RX ADMIN — AMLODIPINE BESYLATE 10 MILLIGRAM(S): 2.5 TABLET ORAL at 05:07

## 2019-06-18 RX ADMIN — Medication 50 MILLIGRAM(S): at 17:53

## 2019-06-18 RX ADMIN — Medication 50 MILLIGRAM(S): at 05:07

## 2019-06-18 RX ADMIN — TAMSULOSIN HYDROCHLORIDE 0.4 MILLIGRAM(S): 0.4 CAPSULE ORAL at 22:14

## 2019-06-18 RX ADMIN — MEROPENEM 100 MILLIGRAM(S): 1 INJECTION INTRAVENOUS at 13:20

## 2019-06-18 RX ADMIN — MEROPENEM 100 MILLIGRAM(S): 1 INJECTION INTRAVENOUS at 22:14

## 2019-06-18 RX ADMIN — ATORVASTATIN CALCIUM 10 MILLIGRAM(S): 80 TABLET, FILM COATED ORAL at 22:14

## 2019-06-18 NOTE — CONSULT NOTE ADULT - SUBJECTIVE AND OBJECTIVE BOX
CHIEF COMPLAINT:    HISTORY OF PRESENT ILLNESS:  [employer] inter: Mitchel [id number]      PAST MEDICAL & SURGICAL HISTORY:  HLD (hyperlipidemia)  BPH (benign prostatic hyperplasia)  Hypertension, unspecified type  Leg injury: s/p surgery      REVIEW OF SYSTEMS:    CONSTITUTIONAL: No weakness, fevers or chills  EYES/ENT: No visual changes;  No vertigo or throat pain   NECK: No pain or stiffness  RESPIRATORY: No cough, wheezing, hemoptysis, No shortness of breath  CARDIOVASCULAR: No chest pain or palpitations  GASTROINTESTINAL: No abdominal or flank pain, No nausea, vomiting, diarrhea or constipation  GENITOURINARY: See HPI  NEUROLOGICAL: No numbness or weakness  SKIN: No rashes or lesions   All other review of systems is negative unless indicated above.    MEDICATIONS  (STANDING):  amLODIPine   Tablet 10 milliGRAM(s) Oral daily  atorvastatin 10 milliGRAM(s) Oral at bedtime  finasteride 5 milliGRAM(s) Oral daily  meropenem  IVPB 1000 milliGRAM(s) IV Intermittent every 8 hours  metoprolol tartrate 50 milliGRAM(s) Oral two times a day  tamsulosin 0.4 milliGRAM(s) Oral at bedtime    MEDICATIONS  (PRN):  docusate sodium 100 milliGRAM(s) Oral three times a day PRN Constipation  ondansetron Injectable 4 milliGRAM(s) IV Push every 6 hours PRN Nausea  oxyCODONE    IR 5 milliGRAM(s) Oral every 6 hours PRN Moderate Pain (4 - 6)  senna 2 Tablet(s) Oral at bedtime PRN Constipation      Allergies    No Known Allergies    Intolerances        SOCIAL HISTORY:    FAMILY HISTORY:  Family hx of prostate cancer (Father)  Family history of uterine cancer (Mother)      Vital Signs Last 24 Hrs  T(C): 36.7 (18 Jun 2019 12:08), Max: 37.2 (17 Jun 2019 22:13)  T(F): 98 (18 Jun 2019 12:08), Max: 99 (17 Jun 2019 22:13)  HR: 103 (18 Jun 2019 17:56) (73 - 103)  BP: 140/81 (18 Jun 2019 17:56) (113/71 - 140/81)  BP(mean): --  RR: 16 (18 Jun 2019 12:08) (16 - 18)  SpO2: 97% (18 Jun 2019 12:08) (94% - 97%)    PHYSICAL EXAM:    Constitutional: No acute distress  HEENT: EOMI, Normal Hearing  Neck: Supple  Back: No costovertebral angle tenderness  Respiratory: Normal respiratory effort    Cardiovascular: Normal peripheral circulation   Abd: Soft, non distended, non tender  : Normal urethra, ***circumcised penis, bilateral normal to palpate  CHERYL: Prostate- *** gms, non tender, no nodules  Extremities: No peripheral edema  Neurological: No focal deficits  Psychiatric: Normal mood, normal affect  Musculoskeletal: Moving all 4 extremities  Skin: No rashes    LABS:    06-18    137  |  103  |  11  ----------------------------<  92  3.7   |  26  |  0.92    Ca    9.1      18 Jun 2019 08:02          Urine Culture:     RADIOLOGY & ADDITIONAL STUDIES: CHIEF COMPLAINT:  Urinary retention    HISTORY OF PRESENT ILLNESS:  [place] : Mitchel, ID: [id number]  68 yo male admitted for positive urine culture- ESBL at the time of Garcia change at last ED visit on 6/11/19.  Has had indwelling Garcia catheter for few months and follows with Dr Pemberton. Undergoing work up for possible TURP.  Wants to discuss Garcia.    PAST MEDICAL & SURGICAL HISTORY:  HLD (hyperlipidemia)  BPH (benign prostatic hyperplasia)  Hypertension, unspecified type  Leg injury: s/p surgery      REVIEW OF SYSTEMS:   All other review of systems is negative unless indicated above.    MEDICATIONS  (STANDING):  amLODIPine   Tablet 10 milliGRAM(s) Oral daily  atorvastatin 10 milliGRAM(s) Oral at bedtime  finasteride 5 milliGRAM(s) Oral daily  meropenem  IVPB 1000 milliGRAM(s) IV Intermittent every 8 hours  metoprolol tartrate 50 milliGRAM(s) Oral two times a day  tamsulosin 0.4 milliGRAM(s) Oral at bedtime    MEDICATIONS  (PRN):  docusate sodium 100 milliGRAM(s) Oral three times a day PRN Constipation  ondansetron Injectable 4 milliGRAM(s) IV Push every 6 hours PRN Nausea  oxyCODONE    IR 5 milliGRAM(s) Oral every 6 hours PRN Moderate Pain (4 - 6)  senna 2 Tablet(s) Oral at bedtime PRN Constipation      Allergies    No Known Allergies    Intolerances        SOCIAL HISTORY:    FAMILY HISTORY:  Family hx of prostate cancer (Father)  Family history of uterine cancer (Mother)      Vital Signs Last 24 Hrs  T(C): 36.7 (18 Jun 2019 12:08), Max: 37.2 (17 Jun 2019 22:13)  T(F): 98 (18 Jun 2019 12:08), Max: 99 (17 Jun 2019 22:13)  HR: 103 (18 Jun 2019 17:56) (73 - 103)  BP: 140/81 (18 Jun 2019 17:56) (113/71 - 140/81)  BP(mean): --  RR: 16 (18 Jun 2019 12:08) (16 - 18)  SpO2: 97% (18 Jun 2019 12:08) (94% - 97%)    PHYSICAL EXAM:    Constitutional: No acute distress  HEENT: EOMI, Normal Hearing  Neck: Supple  Back: No costovertebral angle tenderness  Respiratory: Normal respiratory effort    Cardiovascular: Normal peripheral circulation   Abd: Soft, non distended, non tender  : Normal urethra, penis, bilateral testis normal to palpate  Garcia to drainage bag- clear urine   Extremities: No peripheral edema  Neurological: No focal deficits  Psychiatric: Normal mood, normal affect  Musculoskeletal: Moving all 4 extremities  Skin: No rashes    LABS:    06-18    137  |  103  |  11  ----------------------------<  92  3.7   |  26  |  0.92    Ca    9.1      18 Jun 2019 08:02

## 2019-06-18 NOTE — CONSULT NOTE ADULT - ASSESSMENT
Continue antibiotics, follow cultures, treat per sensitivity.   Follow up with Dr Vega Discussed with Pt that seems like work up is complete and once he is treated for infection he can be probably scheduled for Prostate surgery.  Continue antibiotics, follow cultures, treat per sensitivity.   Follow up with Dr Pemberton

## 2019-06-18 NOTE — PROGRESS NOTE ADULT - SUBJECTIVE AND OBJECTIVE BOX
HOSPITALIST ATTENDING PROGRESS NOTE    Chart and meds reviewed.  Patient seen and examined.    HPI: 70 y/o M PMHx significant for hypertension, hyperlipidemia, BPH, presents to the ED after reportedly being notified concerning an abnormal lab result. The patient was evaluated at  2 days prior to admission for urine retention and suprapubic discomfort. The patient's Peterson catheter was changed as was deemed non-functional at that time. Urine C+S were found to be positive for ESBL. Upon arrival the patient denied any further symptoms of subjective fevers, chills, nausea, or vomiting. As noted the patient is being evaluated by Urology for possible elective TURP. In the ED the patient received Meropenem 1g IVPB x 1, and NS x 1L.    6/17/19 pt seen and examined. [employer]  ID [id number] used. Discussed the case and plan. He verbalized understanding. His concern is the peterson and chronic use of it. He sees urology at the [facility]. He can not recall the name.    6/18/19 Pt seen and examined, [employer]  ID [id number] used. Discussed the case again in detail. He wants to see the urologist to discuss peterson removal and plan.      All 10 systems reviewed and found to be negative with the exception of what has been described above.    MEDICATIONS  (STANDING):  amLODIPine   Tablet 10 milliGRAM(s) Oral daily  atorvastatin 10 milliGRAM(s) Oral at bedtime  finasteride 5 milliGRAM(s) Oral daily  meropenem  IVPB 1000 milliGRAM(s) IV Intermittent every 8 hours  metoprolol tartrate 50 milliGRAM(s) Oral two times a day  tamsulosin 0.4 milliGRAM(s) Oral at bedtime    MEDICATIONS  (PRN):  docusate sodium 100 milliGRAM(s) Oral three times a day PRN Constipation  ondansetron Injectable 4 milliGRAM(s) IV Push every 6 hours PRN Nausea  oxyCODONE    IR 5 milliGRAM(s) Oral every 6 hours PRN Moderate Pain (4 - 6)  senna 2 Tablet(s) Oral at bedtime PRN Constipation      VITALS:  T(F): 98 (06-18-19 @ 12:08), Max: 99 (06-17-19 @ 22:13)  HR: 86 (06-18-19 @ 12:08) (73 - 89)  BP: 128/81 (06-18-19 @ 12:08) (113/71 - 132/83)  RR: 16 (06-18-19 @ 12:08) (16 - 18)  SpO2: 97% (06-18-19 @ 12:08) (94% - 97%)  Wt(kg): --    I&O's Summary    17 Jun 2019 07:01  -  18 Jun 2019 07:00  --------------------------------------------------------  IN: 360 mL / OUT: 5120 mL / NET: -4760 mL    18 Jun 2019 07:01  -  18 Jun 2019 14:57  --------------------------------------------------------  IN: 50 mL / OUT: 0 mL / NET: 50 mL        CAPILLARY BLOOD GLUCOSE          PHYSICAL EXAM:    HEENT:  pupils equal and reactive, EOMI, no oropharyngeal lesions, erythema, exudates, oral thrush  NECK:   supple, no carotid bruits, no palpable lymph nodes, no thyromegaly  CV:  +S1, +S2, regular, no murmurs or rubs  RESP:   lungs clear to auscultation bilaterally, no wheezing, rales, rhonchi, good air entry bilaterally  BREAST:  not examined  GI:  abdomen soft, non-tender, non-distended, normal BS, no bruits, no abdominal masses, no palpable masses  RECTAL:  not examined  :  not examined  MSK:   normal muscle tone, no atrophy, no rigidity, no contractions  EXT:  no clubbing, no cyanosis, no edema, no calf pain, swelling or erythema  VASCULAR:  pulses equal and symmetric in the upper and lower extremities  NEURO:  AAOX3, no focal neurological deficits, follows all commands, able to move extremities spontaneously  SKIN:  no ulcers, lesions or rashes    LABS:        06-18    137  |  103  |  11  ----------------------------<  92  3.7   |  26  |  0.92    Ca    9.1      18 Jun 2019 08:02                                              CULTURES:

## 2019-06-18 NOTE — PROGRESS NOTE ADULT - ASSESSMENT
1. Acute Complicated Cystitis due to a ESBL UTI  # Unable to determine a cause and effect relationship between the UTI and chronic peterson catheter  ~f/u PAN C+S  ~ will cont. Meropenem   ~ ID consultation appreciated  ~cont. contact isolation  ~strict I/Os    2. BPH  ~cont. Peterson  ~cont. Tamsulosin 0.4mg po qhs  ~cont. Finasteride 5mg po daily  outpatient  follow up    3. Hypertension  ~cont. Metoprolol 50mg po q12h    4. Hyperlipidemia  ~cont. statin therapy w/ Atorvastatin 10mg po qhs    5. L hand injury: Xray with no fracture or dislocation.

## 2019-06-19 ENCOUNTER — TRANSCRIPTION ENCOUNTER (OUTPATIENT)
Age: 69
End: 2019-06-19

## 2019-06-19 VITALS
TEMPERATURE: 98 F | OXYGEN SATURATION: 93 % | RESPIRATION RATE: 16 BRPM | SYSTOLIC BLOOD PRESSURE: 116 MMHG | DIASTOLIC BLOOD PRESSURE: 69 MMHG | HEART RATE: 89 BPM

## 2019-06-19 RX ORDER — HEPARIN SODIUM 5000 [USP'U]/ML
5000 INJECTION INTRAVENOUS; SUBCUTANEOUS EVERY 8 HOURS
Refills: 0 | Status: DISCONTINUED | OUTPATIENT
Start: 2019-06-19 | End: 2019-06-19

## 2019-06-19 RX ADMIN — Medication 50 MILLIGRAM(S): at 05:11

## 2019-06-19 RX ADMIN — OXYCODONE HYDROCHLORIDE 5 MILLIGRAM(S): 5 TABLET ORAL at 08:35

## 2019-06-19 RX ADMIN — MEROPENEM 100 MILLIGRAM(S): 1 INJECTION INTRAVENOUS at 14:05

## 2019-06-19 RX ADMIN — AMLODIPINE BESYLATE 10 MILLIGRAM(S): 2.5 TABLET ORAL at 05:11

## 2019-06-19 RX ADMIN — OXYCODONE HYDROCHLORIDE 5 MILLIGRAM(S): 5 TABLET ORAL at 08:05

## 2019-06-19 RX ADMIN — MEROPENEM 100 MILLIGRAM(S): 1 INJECTION INTRAVENOUS at 05:11

## 2019-06-19 RX ADMIN — FINASTERIDE 5 MILLIGRAM(S): 5 TABLET, FILM COATED ORAL at 12:15

## 2019-06-19 RX ADMIN — HEPARIN SODIUM 5000 UNIT(S): 5000 INJECTION INTRAVENOUS; SUBCUTANEOUS at 14:05

## 2019-06-19 NOTE — DISCHARGE NOTE PROVIDER - HOSPITAL COURSE
68 y/o M PMHx significant for hypertension, hyperlipidemia, BPH, presents to the ED after reportedly being notified concerning an abnormal lab result. The patient was evaluated at  2 days prior to admission for urine retention and suprapubic discomfort. The patient's Peterson catheter was changed as was deemed non-functional at that time. Urine C+S were found to be positive for ESBL. Upon arrival the patient denied any further symptoms of subjective fevers, chills, nausea, or vomiting. As noted the patient is being evaluated by Urology for possible elective TURP. In the ED the patient received Meropenem 1g IVPB x 1, and NS x 1L.        6/17/19 pt seen and examined. [email or web address] ([email or web address])  ID [id number] used. Discussed the case and plan. He verbalized understanding. His concern is the peterson and chronic use of it. He sees urology at the [facility]. He can not recall the name.        6/18/19 Pt seen and examined, [employer]  ID [id number] used. Discussed the case again in detail. He wants to see the urologist to discuss peterson removal and plan.        6/19/19 [email or web address] ([email or web address])  ID [id number] used, answered all questions in detail, for DC today.             PHYSICAL EXAM:        Constitutional: NAD, awake and alert, well-developed    HEENT: PERR, EOMI, Normal Hearing, MMM    Neck: Soft and supple, No LAD, No JVD    Respiratory: Breath sounds are clear bilaterally, No wheezing, rales or rhonchi    Cardiovascular: S1 and S2, regular rate and rhythm, no Murmurs, gallops or rubs    Gastrointestinal: Bowel Sounds present, soft, nontender, nondistended, no guarding, no rebound    Extremities: No peripheral edema    Vascular: 2+ peripheral pulses    Neurological: A/O x 3, no focal deficits    Musculoskeletal: 5/5 strength b/l upper and lower extremities    Skin: No rashes            1. Acute Complicated Cystitis due to a ESBL UTI    # Unable to determine a cause and effect relationship between the UTI and chronic peterson catheter    ~f/u PAN C+S    ~ will cont. Meropenem     ~ ID consultation appreciated    ~cont. contact isolation    ~strict I/Os        2. BPH    ~cont. Peterson    ~cont. Tamsulosin 0.4mg po qhs    ~cont. Finasteride 5mg po daily    outpatient  follow up and possible removal of peterson and sx as outpatient        3. Hypertension    ~cont. Metoprolol 50mg po q12h        4. Hyperlipidemia    ~cont. statin therapy w/ Atorvastatin 10mg po qhs        5. L hand injury: Xray with no fracture or dislocation.        total time for discharge 40 minutes

## 2019-06-19 NOTE — PROGRESS NOTE ADULT - ASSESSMENT
70 y/o M PMHx significant for hypertension, hyperlipidemia, BPH, presents to the ED after reportedly being notified concerning an abnormal lab result. The patient was evaluated at  2 days prior to admission for urine retention and suprapubic discomfort. The patient's Peterson catheter was changed as was deemed non-functional at that time. Urine C+S were found to be positive for ESBL. Upon arrival the patient denied any further symptoms of subjective fevers, chills, nausea, or vomiting. As noted the patient is being evaluated by Urology for possible elective TURP. In the ED the patient received Meropenem 1g IVPB x 1, and NS x 1L.     1. ESBL Ecoli cystitis. BPH. urinary retention  - on meropenem 1gmq8h #5  - continue with abx coverage   - plan for 5 day course abx  - blood cx no growth  - monitor temps  - peterson changed 6/13 urology eval appreciated plan for future prostate surgery  - tolerating abx well so far; no side effects noted  - reason for abx use and side effects reviewed with patient  - supportive care  - fu cbc    2. other issues - care per medicine

## 2019-06-19 NOTE — DISCHARGE NOTE PROVIDER - NSDCCPCAREPLAN_GEN_ALL_CORE_FT
PRINCIPAL DISCHARGE DIAGNOSIS  Diagnosis: Urinary tract infection associated with indwelling urethral catheter, subsequent encounter  Assessment and Plan of Treatment:       SECONDARY DISCHARGE DIAGNOSES  Diagnosis: ESBL (extended spectrum beta-lactamase) producing bacteria infection  Assessment and Plan of Treatment:

## 2019-06-19 NOTE — DISCHARGE NOTE PROVIDER - CARE PROVIDER_API CALL
Chris Raymundo)  Urology  [address] [facility], [address] [place] [address]  Phone: [phone]  Fax: [phone]  Follow Up Time:     Neeru Macedo (DO)  Internal Medicine  [address] [facility], [address] [place] [address]  Phone: [phone]  Fax: [phone]  Follow Up Time:

## 2019-06-19 NOTE — DISCHARGE NOTE NURSING/CASE MANAGEMENT/SOCIAL WORK - NSDCDPATPORTLINK_GEN_ALL_CORE
You can access the [facility] Patient Portal, offered by [facility], by registering with the following website: [email or web address]

## 2019-06-19 NOTE — PROGRESS NOTE ADULT - SUBJECTIVE AND OBJECTIVE BOX
Date of service: 06-19-19 @ 09:24    pt seen and examined  c/o of back pain  peterson in place, clear urine   afebrile    ROS: no fever or chills; denies dizziness, no HA, no SOB or cough, no abdominal pain, no diarrhea or constipation;  no legs pain, no rashes    MEDICATIONS  (STANDING):  amLODIPine   Tablet 10 milliGRAM(s) Oral daily  atorvastatin 10 milliGRAM(s) Oral at bedtime  finasteride 5 milliGRAM(s) Oral daily  meropenem  IVPB 1000 milliGRAM(s) IV Intermittent every 8 hours  metoprolol tartrate 50 milliGRAM(s) Oral two times a day  tamsulosin 0.4 milliGRAM(s) Oral at bedtime    Vital Signs Last 24 Hrs  T(C): 36.7 (19 Jun 2019 06:04), Max: 36.7 (18 Jun 2019 12:08)  T(F): 98 (19 Jun 2019 06:04), Max: 98.1 (18 Jun 2019 22:19)  HR: 95 (19 Jun 2019 06:04) (86 - 103)  BP: 140/83 (19 Jun 2019 06:04) (128/81 - 140/83)  BP(mean): --  RR: 16 (19 Jun 2019 06:04) (16 - 16)  SpO2: 97% (19 Jun 2019 06:04) (94% - 97%)    PE:  Constitutional: frail looking  HEENT: NC/AT, EOMI, PERRLA, conjunctivae clear; ears and nose atraumatic; pharynx benign  Neck: supple; thyroid not palpable  Back: no tenderness  Respiratory: respiratory effort normal; clear to auscultation  Cardiovascular: S1S2 regular, no murmurs  Abdomen: soft, not tender, not distended, positive BS; liver and spleen WNL  Genitourinary: no suprapubic tenderness, peterson in place   Lymphatic: no LN palpable  Musculoskeletal: no muscle tenderness, no joint swelling or tenderness  Extremities: no pedal edema  Neurological/ Psychiatric:  moving all extremities  Skin: no rashes; no palpable lesions    Labs: all available labs reviewed               06-18    137  |  103  |  11  ----------------------------<  92  3.7   |  26  |  0.92    Ca    9.1      18 Jun 2019 08:02    Culture - Urine (06.12.19 @ 00:04)    -  Amikacin: S <=16    -  Ampicillin: R >16 These ampicillin results predict results for amoxicillin    -  Ampicillin/Sulbactam: R <=8/4    -  Aztreonam: R 16    -  Cefazolin: R >16 For uncomplicated UTI with K. pneumoniae, E. coli, or P. mirablis: LINUS <=16 is sensitive and LINUS >=32 is resistant. This also predicts results for oral agents cefaclor, cefdinir, cefpodoxime, cefprozil, cefuroxime axetil, cephalexin and locarbef for uncomplicated UTI. Note that some isolates may be susceptible to these agents while testing resistant to cefazolin.    -  Cefepime: R <=4    -  Cefoxitin: S <=8    -  Ceftriaxone: R >32 Enterobacter, Citrobacter, and Serratia may develop resistance during prolonged therapy    -  Ciprofloxacin: R >2    -  Ertapenem: S <=1    -  Gentamicin: S <=4    -  Imipenem: S <=1    -  Levofloxacin: R >4    -  Meropenem: S <=1    -  Nitrofurantoin: R >64 Should not be used to treat pyelonephritis    -  Piperacillin/Tazobactam: R <=16    -  Tigecycline: S <=2    -  Tobramycin: S <=4    -  Trimethoprim/Sulfamethoxazole: R >2/38    Specimen Source: .Urine None    Culture Results:   >100,000 CFU/ml Escherichia coli ESBL    Organism Identification: Escherichia coli ESBL    Organism: Escherichia coli ESBL    Method Type: LINUS      Culture - Blood (06.14.19 @ 18:43)    Specimen Source: .Blood None    Culture Results:   No growth to date.        Radiology: all available radiological tests reviewed    EXAM:  XR CHEST PORTABLE URGENT 1V                            PROCEDURE DATE:  06/14/2019          INTERPRETATION:  History: Dyspnea    Chest:  one view.      Comparison: 03/26/2019    AP radiograph of the chest demonstrates no evidence of infiltrate,   pleural effusion or vascular congestion. No atelectasis is seen. The   cardiac silhouette is normal in size. Osseous structures are intact.    Impression: No active pulmonary disease.    < end of copied text >    Advanced directives addressed: full resuscitation

## 2019-06-20 LAB
CULTURE RESULTS: SIGNIFICANT CHANGE UP
CULTURE RESULTS: SIGNIFICANT CHANGE UP
SPECIMEN SOURCE: SIGNIFICANT CHANGE UP
SPECIMEN SOURCE: SIGNIFICANT CHANGE UP

## 2019-06-21 ENCOUNTER — APPOINTMENT (OUTPATIENT)
Dept: UROLOGY | Facility: CLINIC | Age: 69
End: 2019-06-21
Payer: MEDICAID

## 2019-06-21 PROCEDURE — 99213 OFFICE O/P EST LOW 20 MIN: CPT

## 2019-06-21 NOTE — HISTORY OF PRESENT ILLNESS
[FreeTextEntry1] : The patient and his wife are here. He was recently in the hospital for urinary infection and has been in retention.

## 2019-06-21 NOTE — END OF VISIT
[FreeTextEntry3] : A transurethral resection of the prostate was reviewed in detail with the patient and his wife regarding risks and possible complications. They were informed that the procedure will be done by my associate and that the case will be reviewed in detail with him prior to the procedure.

## 2019-06-21 NOTE — PHYSICAL EXAM
[General Appearance - Well Nourished] : well nourished [General Appearance - Well Developed] : well developed [Normal Appearance] : normal appearance [Abdomen Soft] : soft [Well Groomed] : well groomed [General Appearance - In No Acute Distress] : no acute distress [Urethral Meatus] : meatus normal [Costovertebral Angle Tenderness] : no ~M costovertebral angle tenderness [Abdomen Tenderness] : non-tender [Testes Mass (___cm)] : there were no testicular masses [Urinary Bladder Findings] : the bladder was normal on palpation [Scrotum] : the scrotum was normal [No Prostate Nodules] : no prostate nodules [] : no respiratory distress [Edema] : no peripheral edema [Respiration, Rhythm And Depth] : normal respiratory rhythm and effort [Affect] : the affect was normal [Exaggerated Use Of Accessory Muscles For Inspiration] : no accessory muscle use [Oriented To Time, Place, And Person] : oriented to person, place, and time [Not Anxious] : not anxious [Normal Station and Gait] : the gait and station were normal for the patient's age [Mood] : the mood was normal [No Focal Deficits] : no focal deficits [No Palpable Adenopathy] : no palpable adenopathy

## 2019-06-22 LAB
APPEARANCE: CLEAR
BACTERIA: NEGATIVE
BILIRUBIN URINE: NEGATIVE
BLOOD URINE: NORMAL
COLOR: YELLOW
GLUCOSE QUALITATIVE U: ABNORMAL
HYALINE CASTS: 0 /LPF
KETONES URINE: NEGATIVE
LEUKOCYTE ESTERASE URINE: ABNORMAL
MICROSCOPIC-UA: NORMAL
NITRITE URINE: NEGATIVE
PH URINE: 6.5
PROTEIN URINE: ABNORMAL
RED BLOOD CELLS URINE: 3 /HPF
SPECIFIC GRAVITY URINE: 1.01
SQUAMOUS EPITHELIAL CELLS: 4 /HPF
URINE COMMENTS: NORMAL
UROBILINOGEN URINE: NORMAL
WHITE BLOOD CELLS URINE: 40 /HPF
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
## 2019-06-23 DIAGNOSIS — N39.0 URINARY TRACT INFECTION, SITE NOT SPECIFIED: ICD-10-CM

## 2019-06-23 DIAGNOSIS — N30.00 ACUTE CYSTITIS WITHOUT HEMATURIA: ICD-10-CM

## 2019-06-23 DIAGNOSIS — B96.20 UNSPECIFIED ESCHERICHIA COLI [E. COLI] AS THE CAUSE OF DISEASES CLASSIFIED ELSEWHERE: ICD-10-CM
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
## 2019-06-23 DIAGNOSIS — E78.5 HYPERLIPIDEMIA, UNSPECIFIED: ICD-10-CM

## 2019-06-23 DIAGNOSIS — I10 ESSENTIAL (PRIMARY) HYPERTENSION: ICD-10-CM

## 2019-06-23 DIAGNOSIS — N40.1 BENIGN PROSTATIC HYPERPLASIA WITH LOWER URINARY TRACT SYMPTOMS: ICD-10-CM

## 2019-06-23 DIAGNOSIS — R33.8 OTHER RETENTION OF URINE: ICD-10-CM

## 2019-06-23 DIAGNOSIS — S69.92XA UNSPECIFIED INJURY OF LEFT WRIST, HAND AND FINGER(S), INITIAL ENCOUNTER: ICD-10-CM

## 2019-06-26 DIAGNOSIS — Z76.89 PERSONS ENCOUNTERING HEALTH SERVICES IN OTHER SPECIFIED CIRCUMSTANCES: ICD-10-CM

## 2019-06-30 LAB — BACTERIA UR CULT: ABNORMAL

## 2019-07-01 ENCOUNTER — OUTPATIENT (OUTPATIENT)
Dept: OUTPATIENT SERVICES | Facility: HOSPITAL | Age: 69
LOS: 1 days | End: 2019-07-01
Payer: MEDICAID

## 2019-07-01 DIAGNOSIS — S89.90XA UNSPECIFIED INJURY OF UNSPECIFIED LOWER LEG, INITIAL ENCOUNTER: Chronic | ICD-10-CM

## 2019-07-12 ENCOUNTER — APPOINTMENT (OUTPATIENT)
Dept: UROLOGY | Facility: CLINIC | Age: 69
End: 2019-07-12
Payer: MEDICAID

## 2019-07-12 VITALS — OXYGEN SATURATION: 95 % | HEART RATE: 125 BPM | SYSTOLIC BLOOD PRESSURE: 172 MMHG | DIASTOLIC BLOOD PRESSURE: 97 MMHG

## 2019-07-12 VITALS — SYSTOLIC BLOOD PRESSURE: 144 MMHG | DIASTOLIC BLOOD PRESSURE: 93 MMHG

## 2019-07-12 DIAGNOSIS — N31.2 FLACCID NEUROPATHIC BLADDER, NOT ELSEWHERE CLASSIFIED: ICD-10-CM

## 2019-07-12 PROCEDURE — 51702 INSERT TEMP BLADDER CATH: CPT

## 2019-07-12 PROCEDURE — 99213 OFFICE O/P EST LOW 20 MIN: CPT | Mod: 25

## 2019-07-12 NOTE — HISTORY OF PRESENT ILLNESS
[FreeTextEntry1] : This patient is waiting prostate surgery and is here for catheter change. His wife wanted to review his medications.

## 2019-07-12 NOTE — PHYSICAL EXAM
[General Appearance - Well Developed] : well developed [General Appearance - Well Nourished] : well nourished [Normal Appearance] : normal appearance [Well Groomed] : well groomed [General Appearance - In No Acute Distress] : no acute distress [Abdomen Soft] : soft [Abdomen Tenderness] : non-tender [Costovertebral Angle Tenderness] : no ~M costovertebral angle tenderness [Urethral Meatus] : meatus normal [Urinary Bladder Findings] : the bladder was normal on palpation [Scrotum] : the scrotum was normal [Testes Mass (___cm)] : there were no testicular masses [No Prostate Nodules] : no prostate nodules [Edema] : no peripheral edema [] : no respiratory distress [Exaggerated Use Of Accessory Muscles For Inspiration] : no accessory muscle use [Respiration, Rhythm And Depth] : normal respiratory rhythm and effort [Oriented To Time, Place, And Person] : oriented to person, place, and time [Affect] : the affect was normal [Mood] : the mood was normal [Not Anxious] : not anxious [Normal Station and Gait] : the gait and station were normal for the patient's age [No Focal Deficits] : no focal deficits [No Palpable Adenopathy] : no palpable adenopathy

## 2019-07-19 DIAGNOSIS — Z71.89 OTHER SPECIFIED COUNSELING: ICD-10-CM

## 2019-07-30 DIAGNOSIS — Z76.89 PERSONS ENCOUNTERING HEALTH SERVICES IN OTHER SPECIFIED CIRCUMSTANCES: ICD-10-CM

## 2019-08-09 ENCOUNTER — EMERGENCY (EMERGENCY)
Facility: HOSPITAL | Age: 69
LOS: 0 days | Discharge: HOME | End: 2019-08-09
Attending: FAMILY MEDICINE
Payer: MEDICAID

## 2019-08-09 VITALS
RESPIRATION RATE: 17 BRPM | DIASTOLIC BLOOD PRESSURE: 75 MMHG | OXYGEN SATURATION: 100 % | TEMPERATURE: 98 F | SYSTOLIC BLOOD PRESSURE: 138 MMHG | HEART RATE: 78 BPM

## 2019-08-09 VITALS — HEIGHT: 67 IN | WEIGHT: 164.91 LBS

## 2019-08-09 DIAGNOSIS — S89.90XA UNSPECIFIED INJURY OF UNSPECIFIED LOWER LEG, INITIAL ENCOUNTER: Chronic | ICD-10-CM

## 2019-08-09 DIAGNOSIS — Z46.6 ENCOUNTER FOR FITTING AND ADJUSTMENT OF URINARY DEVICE: ICD-10-CM

## 2019-08-09 DIAGNOSIS — N40.0 BENIGN PROSTATIC HYPERPLASIA WITHOUT LOWER URINARY TRACT SYMPTOMS: ICD-10-CM

## 2019-08-09 DIAGNOSIS — I10 ESSENTIAL (PRIMARY) HYPERTENSION: ICD-10-CM

## 2019-08-09 DIAGNOSIS — E78.5 HYPERLIPIDEMIA, UNSPECIFIED: ICD-10-CM

## 2019-08-09 PROCEDURE — 99282 EMERGENCY DEPT VISIT SF MDM: CPT

## 2019-08-09 NOTE — ED STATDOCS - OBJECTIVE STATEMENT
68 y/o male with a BPH, HLD, HTN, presents to the ED c/o urinary catheter complication. States he needs a change of his Garcia bag. Denies fever. No other complaints at this time. States he has an operation schedule for prostate surgery on 08/21/19. Urologist: Stanley Sotelo.

## 2019-08-09 NOTE — ED ADULT TRIAGE NOTE - CHIEF COMPLAINT QUOTE
patient ambulated in with a steady gait. states the bag of his peterson catheter broke and he needs it changed. no other complaints.

## 2019-08-09 NOTE — ED STATDOCS - PROGRESS NOTE DETAILS
70 y/o male with a BPH, HLD, HTN, presents to the ED c/o urinary catheter complication. States he needs a change of his Garcia bag. Denies fever. No other complaints at this time. -Sandor Boss PA-C Bag changed. pt will FU with PMD. -Sandor Boss PA-C

## 2019-08-13 ENCOUNTER — OUTPATIENT (OUTPATIENT)
Dept: OUTPATIENT SERVICES | Facility: HOSPITAL | Age: 69
LOS: 1 days | End: 2019-08-13
Payer: MEDICAID

## 2019-08-13 VITALS
SYSTOLIC BLOOD PRESSURE: 139 MMHG | DIASTOLIC BLOOD PRESSURE: 89 MMHG | HEIGHT: 64 IN | WEIGHT: 173.06 LBS | OXYGEN SATURATION: 95 % | HEART RATE: 96 BPM | TEMPERATURE: 98 F | RESPIRATION RATE: 16 BRPM

## 2019-08-13 DIAGNOSIS — Z98.890 OTHER SPECIFIED POSTPROCEDURAL STATES: Chronic | ICD-10-CM

## 2019-08-13 DIAGNOSIS — S89.90XA UNSPECIFIED INJURY OF UNSPECIFIED LOWER LEG, INITIAL ENCOUNTER: Chronic | ICD-10-CM

## 2019-08-13 DIAGNOSIS — R33.9 RETENTION OF URINE, UNSPECIFIED: ICD-10-CM

## 2019-08-13 DIAGNOSIS — N40.1 BENIGN PROSTATIC HYPERPLASIA WITH LOWER URINARY TRACT SYMPTOMS: ICD-10-CM

## 2019-08-13 LAB
ANION GAP SERPL CALC-SCNC: 6 MMOL/L — SIGNIFICANT CHANGE UP (ref 5–17)
APPEARANCE UR: ABNORMAL
APTT BLD: 31.9 SEC — SIGNIFICANT CHANGE UP (ref 27.5–36.3)
BASOPHILS # BLD AUTO: 0.06 K/UL — SIGNIFICANT CHANGE UP (ref 0–0.2)
BASOPHILS NFR BLD AUTO: 0.5 % — SIGNIFICANT CHANGE UP (ref 0–2)
BILIRUB UR-MCNC: NEGATIVE — SIGNIFICANT CHANGE UP
BUN SERPL-MCNC: 9 MG/DL — SIGNIFICANT CHANGE UP (ref 7–23)
CALCIUM SERPL-MCNC: 9.8 MG/DL — SIGNIFICANT CHANGE UP (ref 8.5–10.1)
CHLORIDE SERPL-SCNC: 105 MMOL/L — SIGNIFICANT CHANGE UP (ref 96–108)
CO2 SERPL-SCNC: 28 MMOL/L — SIGNIFICANT CHANGE UP (ref 22–31)
COLOR SPEC: YELLOW — SIGNIFICANT CHANGE UP
CREAT SERPL-MCNC: 1.02 MG/DL — SIGNIFICANT CHANGE UP (ref 0.5–1.3)
DIFF PNL FLD: ABNORMAL
EOSINOPHIL # BLD AUTO: 0.25 K/UL — SIGNIFICANT CHANGE UP (ref 0–0.5)
EOSINOPHIL NFR BLD AUTO: 2.2 % — SIGNIFICANT CHANGE UP (ref 0–6)
GLUCOSE SERPL-MCNC: 103 MG/DL — HIGH (ref 70–99)
GLUCOSE UR QL: NEGATIVE MG/DL — SIGNIFICANT CHANGE UP
HCT VFR BLD CALC: 45 % — SIGNIFICANT CHANGE UP (ref 39–50)
HGB BLD-MCNC: 14.8 G/DL — SIGNIFICANT CHANGE UP (ref 13–17)
IMM GRANULOCYTES NFR BLD AUTO: 0.4 % — SIGNIFICANT CHANGE UP (ref 0–1.5)
INR BLD: 1.04 RATIO — SIGNIFICANT CHANGE UP (ref 0.88–1.16)
KETONES UR-MCNC: NEGATIVE — SIGNIFICANT CHANGE UP
LEUKOCYTE ESTERASE UR-ACNC: ABNORMAL
LYMPHOCYTES # BLD AUTO: 2.96 K/UL — SIGNIFICANT CHANGE UP (ref 1–3.3)
LYMPHOCYTES # BLD AUTO: 26.1 % — SIGNIFICANT CHANGE UP (ref 13–44)
MCHC RBC-ENTMCNC: 27.2 PG — SIGNIFICANT CHANGE UP (ref 27–34)
MCHC RBC-ENTMCNC: 32.9 GM/DL — SIGNIFICANT CHANGE UP (ref 32–36)
MCV RBC AUTO: 82.6 FL — SIGNIFICANT CHANGE UP (ref 80–100)
MONOCYTES # BLD AUTO: 1.02 K/UL — HIGH (ref 0–0.9)
MONOCYTES NFR BLD AUTO: 9 % — SIGNIFICANT CHANGE UP (ref 2–14)
NEUTROPHILS # BLD AUTO: 7.01 K/UL — SIGNIFICANT CHANGE UP (ref 1.8–7.4)
NEUTROPHILS NFR BLD AUTO: 61.8 % — SIGNIFICANT CHANGE UP (ref 43–77)
NITRITE UR-MCNC: POSITIVE
PH UR: 8 — SIGNIFICANT CHANGE UP (ref 5–8)
PLATELET # BLD AUTO: 367 K/UL — SIGNIFICANT CHANGE UP (ref 150–400)
POTASSIUM SERPL-MCNC: 3.8 MMOL/L — SIGNIFICANT CHANGE UP (ref 3.5–5.3)
POTASSIUM SERPL-SCNC: 3.8 MMOL/L — SIGNIFICANT CHANGE UP (ref 3.5–5.3)
PROT UR-MCNC: 100 MG/DL
PROTHROM AB SERPL-ACNC: 11.6 SEC — SIGNIFICANT CHANGE UP (ref 10–12.9)
RBC # BLD: 5.45 M/UL — SIGNIFICANT CHANGE UP (ref 4.2–5.8)
RBC # FLD: 17.2 % — HIGH (ref 10.3–14.5)
SODIUM SERPL-SCNC: 139 MMOL/L — SIGNIFICANT CHANGE UP (ref 135–145)
SP GR SPEC: 1.01 — SIGNIFICANT CHANGE UP (ref 1.01–1.02)
UROBILINOGEN FLD QL: NEGATIVE MG/DL — SIGNIFICANT CHANGE UP
WBC # BLD: 11.34 K/UL — HIGH (ref 3.8–10.5)
WBC # FLD AUTO: 11.34 K/UL — HIGH (ref 3.8–10.5)

## 2019-08-13 PROCEDURE — 86850 RBC ANTIBODY SCREEN: CPT

## 2019-08-13 PROCEDURE — 86901 BLOOD TYPING SEROLOGIC RH(D): CPT

## 2019-08-13 PROCEDURE — 85730 THROMBOPLASTIN TIME PARTIAL: CPT

## 2019-08-13 PROCEDURE — 36415 COLL VENOUS BLD VENIPUNCTURE: CPT

## 2019-08-13 PROCEDURE — 86900 BLOOD TYPING SEROLOGIC ABO: CPT

## 2019-08-13 PROCEDURE — 81001 URINALYSIS AUTO W/SCOPE: CPT

## 2019-08-13 PROCEDURE — 85610 PROTHROMBIN TIME: CPT

## 2019-08-13 PROCEDURE — 85025 COMPLETE CBC W/AUTO DIFF WBC: CPT

## 2019-08-13 PROCEDURE — 80048 BASIC METABOLIC PNL TOTAL CA: CPT

## 2019-08-13 PROCEDURE — 87086 URINE CULTURE/COLONY COUNT: CPT

## 2019-08-13 PROCEDURE — G0463: CPT | Mod: 25

## 2019-08-13 RX ORDER — TAMSULOSIN HYDROCHLORIDE 0.4 MG/1
1 CAPSULE ORAL
Qty: 0 | Refills: 0 | DISCHARGE

## 2019-08-13 RX ORDER — ATORVASTATIN CALCIUM 80 MG/1
1 TABLET, FILM COATED ORAL
Qty: 0 | Refills: 0 | DISCHARGE

## 2019-08-13 RX ORDER — METOPROLOL TARTRATE 50 MG
1 TABLET ORAL
Qty: 0 | Refills: 0 | DISCHARGE

## 2019-08-13 NOTE — H&P PST ADULT - NSICDXPASTMEDICALHX_GEN_ALL_CORE_FT
PAST MEDICAL HISTORY:  BPH (benign prostatic hyperplasia)     H/O hepatitis     HLD (hyperlipidemia)     Hypertension, unspecified type     Urinary retention PAST MEDICAL HISTORY:  BPH (benign prostatic hyperplasia)     H/O hepatitis     HLD (hyperlipidemia)     Hypertension, unspecified type     Iron deficiency anemia     Urinary retention

## 2019-08-13 NOTE — H&P PST ADULT - HISTORY OF PRESENT ILLNESS
69 year old male 69 year old Romanian speaking male poor historian ; Pt has  BPH with retention; pt with Garcia catheter x 5 months  ; he presents to PST for planned TURP

## 2019-08-13 NOTE — H&P PST ADULT - ASSESSMENT
69 year old male presents to PST for planned TURP    Plan:  1. PST instructions given ; NPO post midnight   2. Pt instructed to take following meds with sip of water : amlodipine   3. Labs EKG CXR as per surgeon request   4. Medical Optimization  with Dr Salgado   5. Stop NSAIDS ( Aspirin Alev Motrin Mobic Diclofenac), herbal supplements , MVI , Vitamin fish oil 7 days prior to surgery  unless directed by surgeon or cardiologist;

## 2019-08-13 NOTE — H&P PST ADULT - NSICDXFAMILYHX_GEN_ALL_CORE_FT
FAMILY HISTORY:  Father  Still living? No  Family hx of prostate cancer, Age at diagnosis: Age Unknown    Mother  Still living? No  Family history of uterine cancer, Age at diagnosis: Age Unknown

## 2019-08-13 NOTE — H&P PST ADULT - NSANTHOSAYNRD_GEN_A_CORE
No. MIRA screening performed.  STOP BANG Legend: 0-2 = LOW Risk; 3-4 = INTERMEDIATE Risk; 5-8 = HIGH Risk

## 2019-08-14 DIAGNOSIS — R33.9 RETENTION OF URINE, UNSPECIFIED: ICD-10-CM

## 2019-08-14 LAB
CULTURE RESULTS: SIGNIFICANT CHANGE UP
SPECIMEN SOURCE: SIGNIFICANT CHANGE UP

## 2019-08-21 ENCOUNTER — OUTPATIENT (OUTPATIENT)
Dept: INPATIENT UNIT | Facility: HOSPITAL | Age: 69
LOS: 1 days | Discharge: HOME | End: 2019-08-21
Payer: MEDICAID

## 2019-08-21 ENCOUNTER — APPOINTMENT (OUTPATIENT)
Dept: UROLOGY | Facility: HOSPITAL | Age: 69
End: 2019-08-21

## 2019-08-21 ENCOUNTER — RESULT REVIEW (OUTPATIENT)
Age: 69
End: 2019-08-21

## 2019-08-21 ENCOUNTER — OTHER (OUTPATIENT)
Age: 69
End: 2019-08-21

## 2019-08-21 VITALS
HEIGHT: 64 IN | HEART RATE: 85 BPM | DIASTOLIC BLOOD PRESSURE: 101 MMHG | OXYGEN SATURATION: 98 % | SYSTOLIC BLOOD PRESSURE: 156 MMHG | RESPIRATION RATE: 20 BRPM | TEMPERATURE: 98 F | WEIGHT: 173.06 LBS

## 2019-08-21 DIAGNOSIS — S89.90XA UNSPECIFIED INJURY OF UNSPECIFIED LOWER LEG, INITIAL ENCOUNTER: Chronic | ICD-10-CM

## 2019-08-21 DIAGNOSIS — Z98.890 OTHER SPECIFIED POSTPROCEDURAL STATES: Chronic | ICD-10-CM

## 2019-08-21 DIAGNOSIS — R33.9 RETENTION OF URINE, UNSPECIFIED: ICD-10-CM

## 2019-08-21 DIAGNOSIS — N40.1 BENIGN PROSTATIC HYPERPLASIA WITH LOWER URINARY TRACT SYMPTOMS: ICD-10-CM

## 2019-08-21 LAB
ANION GAP SERPL CALC-SCNC: 8 MMOL/L — SIGNIFICANT CHANGE UP (ref 5–17)
BASOPHILS # BLD AUTO: 0.05 K/UL — SIGNIFICANT CHANGE UP (ref 0–0.2)
BASOPHILS NFR BLD AUTO: 0.5 % — SIGNIFICANT CHANGE UP (ref 0–2)
BUN SERPL-MCNC: 11 MG/DL — SIGNIFICANT CHANGE UP (ref 7–23)
CALCIUM SERPL-MCNC: 9.1 MG/DL — SIGNIFICANT CHANGE UP (ref 8.5–10.1)
CHLORIDE SERPL-SCNC: 103 MMOL/L — SIGNIFICANT CHANGE UP (ref 96–108)
CO2 SERPL-SCNC: 28 MMOL/L — SIGNIFICANT CHANGE UP (ref 22–31)
CREAT SERPL-MCNC: 1 MG/DL — SIGNIFICANT CHANGE UP (ref 0.5–1.3)
EOSINOPHIL # BLD AUTO: 0.15 K/UL — SIGNIFICANT CHANGE UP (ref 0–0.5)
EOSINOPHIL NFR BLD AUTO: 1.5 % — SIGNIFICANT CHANGE UP (ref 0–6)
GLUCOSE SERPL-MCNC: 98 MG/DL — SIGNIFICANT CHANGE UP (ref 70–99)
HCT VFR BLD CALC: 40.4 % — SIGNIFICANT CHANGE UP (ref 39–50)
HGB BLD-MCNC: 13 G/DL — SIGNIFICANT CHANGE UP (ref 13–17)
IMM GRANULOCYTES NFR BLD AUTO: 0.5 % — SIGNIFICANT CHANGE UP (ref 0–1.5)
LYMPHOCYTES # BLD AUTO: 1.62 K/UL — SIGNIFICANT CHANGE UP (ref 1–3.3)
LYMPHOCYTES # BLD AUTO: 16.4 % — SIGNIFICANT CHANGE UP (ref 13–44)
MCHC RBC-ENTMCNC: 26.8 PG — LOW (ref 27–34)
MCHC RBC-ENTMCNC: 32.2 GM/DL — SIGNIFICANT CHANGE UP (ref 32–36)
MCV RBC AUTO: 83.3 FL — SIGNIFICANT CHANGE UP (ref 80–100)
MONOCYTES # BLD AUTO: 0.53 K/UL — SIGNIFICANT CHANGE UP (ref 0–0.9)
MONOCYTES NFR BLD AUTO: 5.4 % — SIGNIFICANT CHANGE UP (ref 2–14)
NEUTROPHILS # BLD AUTO: 7.48 K/UL — HIGH (ref 1.8–7.4)
NEUTROPHILS NFR BLD AUTO: 75.7 % — SIGNIFICANT CHANGE UP (ref 43–77)
PLATELET # BLD AUTO: 329 K/UL — SIGNIFICANT CHANGE UP (ref 150–400)
POTASSIUM SERPL-MCNC: 3.9 MMOL/L — SIGNIFICANT CHANGE UP (ref 3.5–5.3)
POTASSIUM SERPL-SCNC: 3.9 MMOL/L — SIGNIFICANT CHANGE UP (ref 3.5–5.3)
RBC # BLD: 4.85 M/UL — SIGNIFICANT CHANGE UP (ref 4.2–5.8)
RBC # FLD: 17.3 % — HIGH (ref 10.3–14.5)
SODIUM SERPL-SCNC: 139 MMOL/L — SIGNIFICANT CHANGE UP (ref 135–145)
WBC # BLD: 9.88 K/UL — SIGNIFICANT CHANGE UP (ref 3.8–10.5)
WBC # FLD AUTO: 9.88 K/UL — SIGNIFICANT CHANGE UP (ref 3.8–10.5)

## 2019-08-21 PROCEDURE — 36415 COLL VENOUS BLD VENIPUNCTURE: CPT

## 2019-08-21 PROCEDURE — 52601 PROSTATECTOMY (TURP): CPT

## 2019-08-21 PROCEDURE — 85025 COMPLETE CBC W/AUTO DIFF WBC: CPT

## 2019-08-21 PROCEDURE — 88305 TISSUE EXAM BY PATHOLOGIST: CPT | Mod: 26

## 2019-08-21 PROCEDURE — 80048 BASIC METABOLIC PNL TOTAL CA: CPT

## 2019-08-21 PROCEDURE — 88305 TISSUE EXAM BY PATHOLOGIST: CPT

## 2019-08-21 RX ORDER — ATORVASTATIN CALCIUM 80 MG/1
1 TABLET, FILM COATED ORAL
Qty: 0 | Refills: 0 | DISCHARGE

## 2019-08-21 RX ORDER — FINASTERIDE 5 MG/1
1 TABLET, FILM COATED ORAL
Qty: 0 | Refills: 0 | DISCHARGE

## 2019-08-21 RX ORDER — AMLODIPINE BESYLATE 2.5 MG/1
1 TABLET ORAL
Qty: 0 | Refills: 0 | DISCHARGE

## 2019-08-21 RX ORDER — PHENAZOPYRIDINE HCL 100 MG
200 TABLET ORAL THREE TIMES A DAY
Refills: 0 | Status: DISCONTINUED | OUTPATIENT
Start: 2019-08-21 | End: 2019-08-22

## 2019-08-21 RX ORDER — FENTANYL CITRATE 50 UG/ML
50 INJECTION INTRAVENOUS
Refills: 0 | Status: DISCONTINUED | OUTPATIENT
Start: 2019-08-21 | End: 2019-08-21

## 2019-08-21 RX ORDER — OXYBUTYNIN CHLORIDE 5 MG
5 TABLET ORAL ONCE
Refills: 0 | Status: COMPLETED | OUTPATIENT
Start: 2019-08-21 | End: 2019-08-21

## 2019-08-21 RX ORDER — AMLODIPINE BESYLATE 2.5 MG/1
10 TABLET ORAL DAILY
Refills: 0 | Status: DISCONTINUED | OUTPATIENT
Start: 2019-08-21 | End: 2019-08-22

## 2019-08-21 RX ORDER — OXYCODONE AND ACETAMINOPHEN 5; 325 MG/1; MG/1
1 TABLET ORAL EVERY 4 HOURS
Refills: 0 | Status: DISCONTINUED | OUTPATIENT
Start: 2019-08-21 | End: 2019-08-22

## 2019-08-21 RX ORDER — OXYCODONE HYDROCHLORIDE 5 MG/1
10 TABLET ORAL ONCE
Refills: 0 | Status: DISCONTINUED | OUTPATIENT
Start: 2019-08-21 | End: 2019-08-21

## 2019-08-21 RX ORDER — ATORVASTATIN CALCIUM 80 MG/1
40 TABLET, FILM COATED ORAL AT BEDTIME
Refills: 0 | Status: DISCONTINUED | OUTPATIENT
Start: 2019-08-21 | End: 2019-08-22

## 2019-08-21 RX ORDER — OXYBUTYNIN CHLORIDE 5 MG
5 TABLET ORAL THREE TIMES A DAY
Refills: 0 | Status: DISCONTINUED | OUTPATIENT
Start: 2019-08-21 | End: 2019-08-22

## 2019-08-21 RX ORDER — SODIUM CHLORIDE 9 MG/ML
1000 INJECTION INTRAMUSCULAR; INTRAVENOUS; SUBCUTANEOUS
Refills: 0 | Status: DISCONTINUED | OUTPATIENT
Start: 2019-08-21 | End: 2019-08-22

## 2019-08-21 RX ORDER — CEFAZOLIN SODIUM 1 G
2000 VIAL (EA) INJECTION EVERY 8 HOURS
Refills: 0 | Status: COMPLETED | OUTPATIENT
Start: 2019-08-21 | End: 2019-08-22

## 2019-08-21 RX ORDER — ONDANSETRON 8 MG/1
4 TABLET, FILM COATED ORAL ONCE
Refills: 0 | Status: DISCONTINUED | OUTPATIENT
Start: 2019-08-21 | End: 2019-08-21

## 2019-08-21 RX ORDER — ACETAMINOPHEN 500 MG
650 TABLET ORAL EVERY 6 HOURS
Refills: 0 | Status: DISCONTINUED | OUTPATIENT
Start: 2019-08-21 | End: 2019-08-22

## 2019-08-21 RX ORDER — PREGABALIN 225 MG/1
0 CAPSULE ORAL
Qty: 0 | Refills: 0 | DISCHARGE

## 2019-08-21 RX ORDER — FINASTERIDE 5 MG/1
5 TABLET, FILM COATED ORAL DAILY
Refills: 0 | Status: DISCONTINUED | OUTPATIENT
Start: 2019-08-21 | End: 2019-08-22

## 2019-08-21 RX ORDER — PHENAZOPYRIDINE HCL 100 MG
200 TABLET ORAL ONCE
Refills: 0 | Status: COMPLETED | OUTPATIENT
Start: 2019-08-21 | End: 2019-08-21

## 2019-08-21 RX ORDER — ONDANSETRON 8 MG/1
4 TABLET, FILM COATED ORAL EVERY 6 HOURS
Refills: 0 | Status: DISCONTINUED | OUTPATIENT
Start: 2019-08-21 | End: 2019-08-22

## 2019-08-21 RX ORDER — FERROUS SULFATE 325(65) MG
1 TABLET ORAL
Qty: 0 | Refills: 0 | DISCHARGE

## 2019-08-21 RX ORDER — SODIUM CHLORIDE 9 MG/ML
1000 INJECTION, SOLUTION INTRAVENOUS
Refills: 0 | Status: DISCONTINUED | OUTPATIENT
Start: 2019-08-21 | End: 2019-08-21

## 2019-08-21 RX ADMIN — AMLODIPINE BESYLATE 10 MILLIGRAM(S): 2.5 TABLET ORAL at 12:40

## 2019-08-21 RX ADMIN — Medication 100 MILLIGRAM(S): at 17:17

## 2019-08-21 RX ADMIN — Medication 5 MILLIGRAM(S): at 10:40

## 2019-08-21 RX ADMIN — ATORVASTATIN CALCIUM 40 MILLIGRAM(S): 80 TABLET, FILM COATED ORAL at 22:14

## 2019-08-21 RX ADMIN — SODIUM CHLORIDE 75 MILLILITER(S): 9 INJECTION, SOLUTION INTRAVENOUS at 10:40

## 2019-08-21 RX ADMIN — Medication 200 MILLIGRAM(S): at 10:40

## 2019-08-21 RX ADMIN — FINASTERIDE 5 MILLIGRAM(S): 5 TABLET, FILM COATED ORAL at 12:40

## 2019-08-21 NOTE — ASU PATIENT PROFILE, ADULT - PMH
BPH (benign prostatic hyperplasia)    H/O hepatitis    HLD (hyperlipidemia)    Hypertension, unspecified type    Iron deficiency anemia    Urinary retention

## 2019-08-22 ENCOUNTER — TRANSCRIPTION ENCOUNTER (OUTPATIENT)
Age: 69
End: 2019-08-22

## 2019-08-22 VITALS
OXYGEN SATURATION: 95 % | DIASTOLIC BLOOD PRESSURE: 71 MMHG | SYSTOLIC BLOOD PRESSURE: 139 MMHG | HEART RATE: 89 BPM | RESPIRATION RATE: 18 BRPM | TEMPERATURE: 98 F

## 2019-08-22 PROBLEM — D50.9 IRON DEFICIENCY ANEMIA, UNSPECIFIED: Chronic | Status: ACTIVE | Noted: 2019-08-13

## 2019-08-22 PROBLEM — Z86.19 PERSONAL HISTORY OF OTHER INFECTIOUS AND PARASITIC DISEASES: Chronic | Status: ACTIVE | Noted: 2019-08-13

## 2019-08-22 PROBLEM — R33.9 RETENTION OF URINE, UNSPECIFIED: Chronic | Status: ACTIVE | Noted: 2019-08-13

## 2019-08-22 LAB
ANION GAP SERPL CALC-SCNC: 6 MMOL/L — SIGNIFICANT CHANGE UP (ref 5–17)
BASOPHILS # BLD AUTO: 0.03 K/UL — SIGNIFICANT CHANGE UP (ref 0–0.2)
BASOPHILS NFR BLD AUTO: 0.2 % — SIGNIFICANT CHANGE UP (ref 0–2)
BUN SERPL-MCNC: 15 MG/DL — SIGNIFICANT CHANGE UP (ref 7–23)
CALCIUM SERPL-MCNC: 8.9 MG/DL — SIGNIFICANT CHANGE UP (ref 8.5–10.1)
CHLORIDE SERPL-SCNC: 106 MMOL/L — SIGNIFICANT CHANGE UP (ref 96–108)
CO2 SERPL-SCNC: 27 MMOL/L — SIGNIFICANT CHANGE UP (ref 22–31)
CREAT SERPL-MCNC: 0.91 MG/DL — SIGNIFICANT CHANGE UP (ref 0.5–1.3)
EOSINOPHIL # BLD AUTO: 0.01 K/UL — SIGNIFICANT CHANGE UP (ref 0–0.5)
EOSINOPHIL NFR BLD AUTO: 0.1 % — SIGNIFICANT CHANGE UP (ref 0–6)
GLUCOSE SERPL-MCNC: 121 MG/DL — HIGH (ref 70–99)
HCT VFR BLD CALC: 38.5 % — LOW (ref 39–50)
HGB BLD-MCNC: 13 G/DL — SIGNIFICANT CHANGE UP (ref 13–17)
IMM GRANULOCYTES NFR BLD AUTO: 0.6 % — SIGNIFICANT CHANGE UP (ref 0–1.5)
LYMPHOCYTES # BLD AUTO: 1.59 K/UL — SIGNIFICANT CHANGE UP (ref 1–3.3)
LYMPHOCYTES # BLD AUTO: 9.9 % — LOW (ref 13–44)
MCHC RBC-ENTMCNC: 27.6 PG — SIGNIFICANT CHANGE UP (ref 27–34)
MCHC RBC-ENTMCNC: 33.8 GM/DL — SIGNIFICANT CHANGE UP (ref 32–36)
MCV RBC AUTO: 81.7 FL — SIGNIFICANT CHANGE UP (ref 80–100)
MONOCYTES # BLD AUTO: 1.01 K/UL — HIGH (ref 0–0.9)
MONOCYTES NFR BLD AUTO: 6.3 % — SIGNIFICANT CHANGE UP (ref 2–14)
NEUTROPHILS # BLD AUTO: 13.29 K/UL — HIGH (ref 1.8–7.4)
NEUTROPHILS NFR BLD AUTO: 82.9 % — HIGH (ref 43–77)
PLATELET # BLD AUTO: 321 K/UL — SIGNIFICANT CHANGE UP (ref 150–400)
POTASSIUM SERPL-MCNC: 3.9 MMOL/L — SIGNIFICANT CHANGE UP (ref 3.5–5.3)
POTASSIUM SERPL-SCNC: 3.9 MMOL/L — SIGNIFICANT CHANGE UP (ref 3.5–5.3)
RBC # BLD: 4.71 M/UL — SIGNIFICANT CHANGE UP (ref 4.2–5.8)
RBC # FLD: 17.2 % — HIGH (ref 10.3–14.5)
SODIUM SERPL-SCNC: 139 MMOL/L — SIGNIFICANT CHANGE UP (ref 135–145)
WBC # BLD: 16.02 K/UL — HIGH (ref 3.8–10.5)
WBC # FLD AUTO: 16.02 K/UL — HIGH (ref 3.8–10.5)

## 2019-08-22 RX ORDER — OXYBUTYNIN CHLORIDE 5 MG
1 TABLET ORAL
Qty: 12 | Refills: 0
Start: 2019-08-22 | End: 2019-08-25

## 2019-08-22 RX ORDER — CIPROFLOXACIN LACTATE 400MG/40ML
1 VIAL (ML) INTRAVENOUS
Qty: 10 | Refills: 0
Start: 2019-08-22 | End: 2019-08-26

## 2019-08-22 RX ADMIN — AMLODIPINE BESYLATE 10 MILLIGRAM(S): 2.5 TABLET ORAL at 06:26

## 2019-08-22 RX ADMIN — Medication 100 MILLIGRAM(S): at 00:29

## 2019-08-22 NOTE — PROGRESS NOTE ADULT - SUBJECTIVE AND OBJECTIVE BOX
Pt seen and examined at bedside. No acute events overnight, no new complaints. CBI ran clear to pink. No manual irrigation required. No fevers, no chills, no SOB, no CP, no abd pain, on nausea, no vomiting.     Vital Signs Last 24 Hrs  T(C): 36.6 (22 Aug 2019 04:50), Max: 37 (21 Aug 2019 10:15)  T(F): 97.9 (22 Aug 2019 04:50), Max: 98.6 (21 Aug 2019 10:15)  HR: 85 (22 Aug 2019 04:50) (85 - 111)  BP: 134/82 (22 Aug 2019 04:50) (105/86 - 150/98)  BP(mean): --  RR: 17 (22 Aug 2019 04:50) (14 - 18)  SpO2: 96% (22 Aug 2019 04:50) (92% - 99%)                          13.0   9.88  )-----------( 329      ( 21 Aug 2019 10:39 )             40.4   08-21    139  |  103  |  11  ----------------------------<  98  3.9   |  28  |  1.00    Ca    9.1      21 Aug 2019 10:39    8/22 AM LABS PENDING

## 2019-08-22 NOTE — PROGRESS NOTE ADULT - ASSESSMENT
68 yo M s/p TURP, POD#1 doing well.  - breakfast  - f/u labs  - CBI clamped. If tolerates breakfast, labs acceptable, and urine has good flow without clot retention pt is ok for DC

## 2019-08-22 NOTE — DISCHARGE NOTE NURSING/CASE MANAGEMENT/SOCIAL WORK - NSDCDPATPORTLINK_GEN_ALL_CORE
You can access the [facility] Portal, offered by [facility], by registering with the following website: [email or web address]

## 2019-08-22 NOTE — ASU DISCHARGE PLAN (ADULT/PEDIATRIC) - CALL YOUR DOCTOR IF YOU HAVE ANY OF THE FOLLOWING:
Pain not relieved by Medications/Catheter stops draining/Inability to tolerate liquids or foods/Fever greater than (need to indicate Fahrenheit or Celsius)

## 2019-08-22 NOTE — ASU DISCHARGE PLAN (ADULT/PEDIATRIC) - CARE PROVIDER_API CALL
Awais Sevilla)  Urology  [address], [address] [place] [address]  Phone: [phone]  Fax: [phone]  Follow Up Time: 1 week

## 2019-08-28 DIAGNOSIS — N13.8 OTHER OBSTRUCTIVE AND REFLUX UROPATHY: ICD-10-CM

## 2019-08-28 DIAGNOSIS — E11.9 TYPE 2 DIABETES MELLITUS WITHOUT COMPLICATIONS: ICD-10-CM

## 2019-08-28 DIAGNOSIS — N40.1 BENIGN PROSTATIC HYPERPLASIA WITH LOWER URINARY TRACT SYMPTOMS: ICD-10-CM

## 2019-08-28 DIAGNOSIS — D64.9 ANEMIA, UNSPECIFIED: ICD-10-CM

## 2019-08-28 DIAGNOSIS — E78.5 HYPERLIPIDEMIA, UNSPECIFIED: ICD-10-CM

## 2019-08-28 DIAGNOSIS — N41.1 CHRONIC PROSTATITIS: ICD-10-CM

## 2019-08-28 DIAGNOSIS — I10 ESSENTIAL (PRIMARY) HYPERTENSION: ICD-10-CM

## 2019-08-29 ENCOUNTER — APPOINTMENT (OUTPATIENT)
Dept: UROLOGY | Facility: CLINIC | Age: 69
End: 2019-08-29
Payer: MEDICAID

## 2019-08-29 VITALS
HEART RATE: 77 BPM | SYSTOLIC BLOOD PRESSURE: 159 MMHG | TEMPERATURE: 98.5 F | OXYGEN SATURATION: 96 % | DIASTOLIC BLOOD PRESSURE: 83 MMHG

## 2019-08-29 PROCEDURE — 99024 POSTOP FOLLOW-UP VISIT: CPT

## 2019-10-10 ENCOUNTER — APPOINTMENT (OUTPATIENT)
Age: 69
End: 2019-10-10
Payer: MEDICAID

## 2019-10-10 DIAGNOSIS — R39.15 URGENCY OF URINATION: ICD-10-CM

## 2019-10-10 DIAGNOSIS — Z87.898 PERSONAL HISTORY OF OTHER SPECIFIED CONDITIONS: ICD-10-CM

## 2019-10-10 PROCEDURE — 99024 POSTOP FOLLOW-UP VISIT: CPT

## 2019-10-10 RX ORDER — CIPROFLOXACIN HYDROCHLORIDE 500 MG/1
500 TABLET, FILM COATED ORAL
Qty: 20 | Refills: 0 | Status: DISCONTINUED | COMMUNITY
Start: 2019-05-23 | End: 2019-10-10

## 2019-10-10 NOTE — REASON FOR VISIT
[Follow-up Visit ___] : a follow-up visit  for [unfilled] [[employer] ] : provided by [employer]   [FreeTextEntry1] : [id number] [FreeTextEntry2] : Rod [TWNoteComboBox1] : Rwandan

## 2019-10-10 NOTE — PHYSICAL EXAM
[General Appearance - Well Developed] : well developed [General Appearance - Well Nourished] : well nourished [Normal Appearance] : normal appearance [General Appearance - In No Acute Distress] : no acute distress [Well Groomed] : well groomed [Abdomen Soft] : soft [Abdomen Tenderness] : non-tender [Costovertebral Angle Tenderness] : no ~M costovertebral angle tenderness [Urinary Bladder Findings] : the bladder was normal on palpation [Edema] : no peripheral edema [Exaggerated Use Of Accessory Muscles For Inspiration] : no accessory muscle use [] : no respiratory distress [Respiration, Rhythm And Depth] : normal respiratory rhythm and effort [Oriented To Time, Place, And Person] : oriented to person, place, and time [Affect] : the affect was normal [Mood] : the mood was normal [Not Anxious] : not anxious [No Palpable Adenopathy] : no palpable adenopathy [No Focal Deficits] : no focal deficits [FreeTextEntry1] : walks with cane

## 2019-10-10 NOTE — ASSESSMENT
[FreeTextEntry1] : 68 yo M with h/o BPH with urinary retention, now s/p TURP and doing well. We discussed that urgency will likely improve now that PEREZ is treated. Rx for oxybutynin for symptoms now.

## 2019-10-10 NOTE — HISTORY OF PRESENT ILLNESS
[FreeTextEntry1] : 70 yo M known to Dr Pemberton for BPH with urinary retention, chronic indwelling catheter. He underwent TURP 8/21/19, with successful TOV 8/29/19.\par \par 10/10/2019: Patient presents for follow up. He reports voiding well, feels he is emptying to completion. He does report urgency with occasional urge incontinence if he delays voiding. No hematuria, no dysuria, no frequency, no hesitancy, no straining. No fevers, no chills, no nausea, no vomiting, no flank pain. \par PVR (10/10/2019 ): 0 mL

## 2020-06-23 ENCOUNTER — APPOINTMENT (OUTPATIENT)
Dept: UROLOGY | Facility: CLINIC | Age: 70
End: 2020-06-23
Payer: MEDICAID

## 2020-06-23 VITALS
WEIGHT: 165 LBS | DIASTOLIC BLOOD PRESSURE: 77 MMHG | OXYGEN SATURATION: 94 % | BODY MASS INDEX: 29.23 KG/M2 | SYSTOLIC BLOOD PRESSURE: 126 MMHG | HEART RATE: 78 BPM | TEMPERATURE: 98.7 F | HEIGHT: 63 IN

## 2020-06-23 PROCEDURE — 99213 OFFICE O/P EST LOW 20 MIN: CPT

## 2020-06-23 NOTE — ASSESSMENT
[FreeTextEntry1] : 71 yo M with h/o BPH with urinary retention, now s/p TURP and doing well. Urgency resolved. Pt can hold tamsulosin and oxybutynin. Chooses to continue finasteride to prevent recurrence of PEREZ from BPH. \par \par For ED, we discussed that ED etiology can be psychogenic, arterial, venous leak, neurologic or a combination. Penile Ultrasound can be performed to evaluate cardiovascular causes. We discussed treatment options include oral PDE5 inhibitors, intraurethral alprostadil, intracavernosal injections, or penile implant placement surgery. Rx placed for tadalafil. patient was instructed to take 10 mg of tadalafil 1-20 hours prior to desired time of sexual activity. Pt was instructed that medication is most effective on an empty stomach without alcohol intake, and that medication requires sexual stimulation to work. The adverse effects, including low blood pressure, vision changes, headache, GI upset. Pt will seek emergent medical attention if he develops chest pain or an erection lasting more than 4 hours. Pt will discontinue sildenafil if he is placed on nitroglycerin for cardiac disease.

## 2020-06-23 NOTE — PHYSICAL EXAM
[General Appearance - Well Developed] : well developed [Normal Appearance] : normal appearance [General Appearance - Well Nourished] : well nourished [Well Groomed] : well groomed [General Appearance - In No Acute Distress] : no acute distress [Abdomen Soft] : soft [Abdomen Tenderness] : non-tender [Costovertebral Angle Tenderness] : no ~M costovertebral angle tenderness [Urinary Bladder Findings] : the bladder was normal on palpation [Edema] : no peripheral edema [] : no respiratory distress [Respiration, Rhythm And Depth] : normal respiratory rhythm and effort [Exaggerated Use Of Accessory Muscles For Inspiration] : no accessory muscle use [Oriented To Time, Place, And Person] : oriented to person, place, and time [Affect] : the affect was normal [Mood] : the mood was normal [FreeTextEntry1] : walks with cane [Not Anxious] : not anxious [No Focal Deficits] : no focal deficits [No Palpable Adenopathy] : no palpable adenopathy

## 2020-06-23 NOTE — HISTORY OF PRESENT ILLNESS
[FreeTextEntry1] : 70 yo M known to Dr Pemberton for BPH with urinary retention, chronic indwelling catheter. He underwent TURP 8/21/19, with successful TOV 8/29/19.\par \par 10/10/2019: Patient presents for follow up. He reports voiding well, feels he is emptying to completion. He does report urgency with occasional urge incontinence if he delays voiding. No hematuria, no dysuria, no frequency, no hesitancy, no straining. No fevers, no chills, no nausea, no vomiting, no flank pain. \par PVR (10/10/2019 ): 0 mL\par \par 06/23/2020: Patient presents for follow up. He reports voiding well, no new  symptoms and other medical  issues remain unchanged from above. No hematuria, no dysuria, no frequency, no urgency, no hesitancy, no straining. No incontinence. No fevers, no chills, no nausea, no vomiting, no flank pain. He does complain of ED since procedure. Has not tried any medications for this. He is cagey about specifics.

## 2020-06-23 NOTE — REASON FOR VISIT
[[employer] ] : provided by [employer]   [Follow-up Visit ___] : a follow-up visit  for [unfilled] [FreeTextEntry1] : [id number] [FreeTextEntry2] : Rod [TWNoteComboBox1] : Yemeni

## 2020-07-22 NOTE — ED ADULT NURSE NOTE - TELEPHONIC ID NUMBER OF THE INTERPRETER
CHF Outreach     Interactive patient contact:   Symptom Review:  TIA 6/8/20, still experiencing L hand numbness/weakness. Having episodes of tachycardia with palpitations. None currently. Chronic Condition Education:  Review of Zone Management Tool - yes  Does patient understand symptoms to call? - Yes  Patient knows who to call to report symptoms? - Yes  Outcome of education:  Verbalized understanding    Oxygen Therapy:  is not on home oxygen therapy. Healthy diet reviewed - yes   Monitor sodium intake - patient understands sodium restriction given by provider? N/A    Daily Weights:   QAM Stable over 7 days  Education re Daily weight - first thing in AM - after void/before meal - yes   Call for weight gain trends as directed by provider - yes     Medication Review:   Medication review completed - yes  Does patient have all prescriptions filled? : Yes  Is patient taking all medications as directed by provider? - Yes  Are there barriers to patient taking medications as directed? -  Nio Barriers Identified  Patient on inhalers? Yes  Inhaler technique and use reviewed - yes    Recently began Xarelto 20mg QD.       Current Outpatient Medications   Medication Sig Dispense Refill    rivaroxaban (XARELTO) 20 MG TABS tablet Take 1 tablet by mouth daily (with breakfast) 90 tablet 3    fluticasone-salmeterol (ADVAIR) 250-50 MCG/DOSE AEPB Inhale 1 puff into the lungs every 12 hours 60 each 5    fluticasone-vilanterol (BREO ELLIPTA) 100-25 MCG/INH AEPB inhaler Inhale 1 puff into the lungs daily 30 each 5    glipiZIDE (GLUCOTROL) 5 MG tablet Take 1 tablet by mouth 2 times daily 180 tablet 1    metoprolol succinate (TOPROL XL) 50 MG extended release tablet Take 100mg in the morning and 50 mg at night 270 tablet 3    fluticasone (FLONASE) 50 MCG/ACT nasal spray 2 sprays by Each Nostril route daily (Patient taking differently: 2 sprays by Each Nostril route daily as needed ) 3 Bottle 1    metFORMIN (GLUCOPHAGE) 500 MG tablet Take 2 tablets by mouth 2 times daily (with meals) 120 tablet 5    lisinopril (PRINIVIL;ZESTRIL) 10 MG tablet TAKE ONE TABLET BY MOUTH DAILY 90 tablet 1    atorvastatin (LIPITOR) 40 MG tablet TAKE 1 TABLET BY MOUTH DAILY AT BEDTIME 90 tablet 1    aspirin 81 MG tablet Take 81 mg by mouth daily      albuterol sulfate (PROAIR RESPICLICK) 958 (90 Base) MCG/ACT aerosol powder inhalation INHALE 2 PUFFS EVERY 6 HOURS AS NEEDED FOR WHEEZING/SHORTNESS OF BREATH 1 Inhaler 11    vitamin D (CHOLECALCIFEROL) 1000 UNIT TABS tablet Take 2,000 Units by mouth 2 times daily       glucose blood VI test strips (ASCENSIA AUTODISC VI;ONE TOUCH ULTRA TEST VI) strip Test blood sugars twice daily 100 each 99    [facility] LANCETS ORIGINAL MISC Check twice daily. Lancets approved by insurance company and patient choice 100 each 3     No current facility-administered medications for this visit.         Last Appt with PCP -  6/16/20    - Schedule Appt with PCP  Does patient have questions or concerns that need addressed prior to follow up office visit? - no  Was patient instructed to bring all medications to the follow-up visit? - Yes    Future Appointments   Date Time Provider Carmita Donahue   7/23/2020  9:00 AM MD MADDY Pickett [facility]   8/11/2020 10:15 AM DO GIA Segovia [facility]   9/10/2020 11:15 AM MD Haydee Jurado [facility]   10/1/2020  8:00 AM MD Samuel Jurado LPN N/A

## 2020-09-03 NOTE — PATIENT PROFILE ADULT - INFORMATION PROVIDED TO:
FYI pts  Julius Bernstein called stating we will be receiving a fax from [employer]  States they will be faxing request for battery for power-wheelchair  States pt needs it for emergency's and upcoming appts  Notified Julius Bernstein we have not received fax gave him fax number please keep and eye on [phone] 
patient

## 2020-12-17 ENCOUNTER — APPOINTMENT (OUTPATIENT)
Dept: UROLOGY | Facility: CLINIC | Age: 70
End: 2020-12-17
Payer: MEDICAID

## 2020-12-17 VITALS — DIASTOLIC BLOOD PRESSURE: 99 MMHG | TEMPERATURE: 97.6 F | SYSTOLIC BLOOD PRESSURE: 154 MMHG | HEART RATE: 93 BPM

## 2020-12-17 DIAGNOSIS — N13.8 BENIGN PROSTATIC HYPERPLASIA WITH LOWER URINARY TRACT SYMPMS: ICD-10-CM

## 2020-12-17 DIAGNOSIS — N40.1 BENIGN PROSTATIC HYPERPLASIA WITH LOWER URINARY TRACT SYMPMS: ICD-10-CM

## 2020-12-17 DIAGNOSIS — Z12.5 ENCOUNTER FOR SCREENING FOR MALIGNANT NEOPLASM OF PROSTATE: ICD-10-CM

## 2020-12-17 DIAGNOSIS — N52.9 MALE ERECTILE DYSFUNCTION, UNSPECIFIED: ICD-10-CM

## 2020-12-17 PROCEDURE — 99213 OFFICE O/P EST LOW 20 MIN: CPT

## 2020-12-17 PROCEDURE — 99072 ADDL SUPL MATRL&STAF TM PHE: CPT

## 2020-12-17 RX ORDER — OXYBUTYNIN CHLORIDE 5 MG/1
5 TABLET, EXTENDED RELEASE ORAL
Qty: 30 | Refills: 5 | Status: DISCONTINUED | COMMUNITY
Start: 2019-10-10 | End: 2020-12-17

## 2020-12-17 RX ORDER — TAMSULOSIN HYDROCHLORIDE 0.4 MG/1
0.4 CAPSULE ORAL
Qty: 90 | Refills: 3 | Status: DISCONTINUED | COMMUNITY
Start: 2019-05-02 | End: 2020-12-17

## 2020-12-17 NOTE — REASON FOR VISIT
[[employer] ] : provided by [employer]   [Follow-up Visit ___] : a follow-up visit  for [unfilled] [FreeTextEntry1] : [id number] [FreeTextEntry2] : Whitney [TWNoteComboBox1] : Belizean

## 2020-12-17 NOTE — PHYSICAL EXAM
[General Appearance - Well Developed] : well developed [General Appearance - Well Nourished] : well nourished [Normal Appearance] : normal appearance [Well Groomed] : well groomed [General Appearance - In No Acute Distress] : no acute distress [Abdomen Soft] : soft [Abdomen Tenderness] : non-tender [Costovertebral Angle Tenderness] : no ~M costovertebral angle tenderness [Urinary Bladder Findings] : the bladder was normal on palpation [Edema] : no peripheral edema [] : no respiratory distress [Respiration, Rhythm And Depth] : normal respiratory rhythm and effort [Exaggerated Use Of Accessory Muscles For Inspiration] : no accessory muscle use [Oriented To Time, Place, And Person] : oriented to person, place, and time [Affect] : the affect was normal [Mood] : the mood was normal [Not Anxious] : not anxious [FreeTextEntry1] : walks with cane [No Focal Deficits] : no focal deficits [No Palpable Adenopathy] : no palpable adenopathy

## 2020-12-17 NOTE — HISTORY OF PRESENT ILLNESS
[FreeTextEntry1] : 70 yo M known to Dr Pemberton for BPH with urinary retention, chronic indwelling catheter. He underwent TURP 8/21/19, with successful TOV 8/29/19.\par \par 10/10/2019: Patient presents for follow up. He reports voiding well, feels he is emptying to completion. He does report urgency with occasional urge incontinence if he delays voiding. No hematuria, no dysuria, no frequency, no hesitancy, no straining. No fevers, no chills, no nausea, no vomiting, no flank pain. \par PVR (10/10/2019 ): 0 mL\par \par 06/23/2020: Patient presents for follow up. He reports voiding well, no new  symptoms and other medical  issues remain unchanged from above. No hematuria, no dysuria, no frequency, no urgency, no hesitancy, no straining. No incontinence. No fevers, no chills, no nausea, no vomiting, no flank pain. He does complain of ED since procedure. Has not tried any medications for this. He is cagey about specifics.\par \par 12/17/2020: Patient presents for follow up. He reports  symptoms remain well controlled, He says he is not sexually active, but does say the tadalafil was effective. No hematuria, no dysuria, no frequency, no urgency, no hesitancy, no straining. No incontinence. No fevers, no chills, no nausea, no vomiting, no flank pain.

## 2020-12-17 NOTE — ASSESSMENT
[FreeTextEntry1] : 69 yo M with h/o BPH with urinary retention, now s/p TURP and doing well. Urgency resolved. . Chooses to continue finasteride to prevent recurrence of PEREZ from BPH. \par \par For ED, good response to PDE5i. Recommend continue, will renew. \par \par Screening PSA ordered for now and in 1 year. RTO 1 year or sooner PRN

## 2020-12-21 LAB
PSA FREE FLD-MCNC: 28 %
PSA FREE SERPL-MCNC: 0.37 NG/ML
PSA SERPL-MCNC: 1.3 NG/ML

## 2020-12-23 ENCOUNTER — APPOINTMENT (OUTPATIENT)
Dept: ORTHOPEDIC SURGERY | Facility: CLINIC | Age: 70
End: 2020-12-23
Payer: MEDICAID

## 2020-12-23 PROCEDURE — 73562 X-RAY EXAM OF KNEE 3: CPT | Mod: 26,50

## 2020-12-23 PROCEDURE — 99204 OFFICE O/P NEW MOD 45 MIN: CPT

## 2020-12-23 PROCEDURE — 99072 ADDL SUPL MATRL&STAF TM PHE: CPT

## 2020-12-23 NOTE — DISCUSSION/SUMMARY
[de-identified] : At this point in time I would like to refer Zbigniew out to see Dr. Ronald Moran in [place] for an evaluation of his knees.  In the interim for the pain I prescribed him a one day anti-inflammatory to use as needed with food. He can use over-the-counter knee braces for support. All of his questions were answered. He understood the treatment course.
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
## 2021-01-06 ENCOUNTER — OUTPATIENT (OUTPATIENT)
Dept: OUTPATIENT SERVICES | Facility: HOSPITAL | Age: 71
LOS: 1 days | End: 2021-01-06
Payer: MEDICAID

## 2021-01-06 ENCOUNTER — APPOINTMENT (OUTPATIENT)
Dept: ORTHOPEDIC SURGERY | Facility: CLINIC | Age: 71
End: 2021-01-06
Payer: MEDICAID

## 2021-01-06 VITALS
SYSTOLIC BLOOD PRESSURE: 151 MMHG | BODY MASS INDEX: 29.23 KG/M2 | HEIGHT: 63 IN | WEIGHT: 165 LBS | HEART RATE: 91 BPM | DIASTOLIC BLOOD PRESSURE: 96 MMHG

## 2021-01-06 DIAGNOSIS — Z86.79 PERSONAL HISTORY OF OTHER DISEASES OF THE CIRCULATORY SYSTEM: ICD-10-CM

## 2021-01-06 DIAGNOSIS — M17.0 BILATERAL PRIMARY OSTEOARTHRITIS OF KNEE: ICD-10-CM

## 2021-01-06 DIAGNOSIS — Z80.9 FAMILY HISTORY OF MALIGNANT NEOPLASM, UNSPECIFIED: ICD-10-CM

## 2021-01-06 DIAGNOSIS — Z20.828 CONTACT WITH AND (SUSPECTED) EXPOSURE TO OTHER VIRAL COMMUNICABLE DISEASES: ICD-10-CM

## 2021-01-06 DIAGNOSIS — Z98.890 OTHER SPECIFIED POSTPROCEDURAL STATES: Chronic | ICD-10-CM

## 2021-01-06 DIAGNOSIS — S89.90XA UNSPECIFIED INJURY OF UNSPECIFIED LOWER LEG, INITIAL ENCOUNTER: Chronic | ICD-10-CM

## 2021-01-06 LAB — SARS-COV-2 RNA SPEC QL NAA+PROBE: SIGNIFICANT CHANGE UP

## 2021-01-06 PROCEDURE — C9803: CPT

## 2021-01-06 PROCEDURE — U0005: CPT

## 2021-01-06 PROCEDURE — 99072 ADDL SUPL MATRL&STAF TM PHE: CPT

## 2021-01-06 PROCEDURE — 99213 OFFICE O/P EST LOW 20 MIN: CPT

## 2021-01-06 PROCEDURE — U0003: CPT

## 2021-01-07 DIAGNOSIS — Z20.828 CONTACT WITH AND (SUSPECTED) EXPOSURE TO OTHER VIRAL COMMUNICABLE DISEASES: ICD-10-CM

## 2021-01-14 NOTE — DISCUSSION/SUMMARY
[de-identified] : At this time, due to osteoarthritis of bilateral knees, I recommended collateral hinged bracing and visco for both knees.\par \par The patient was prescribed a rigid support Playmaker knee brace with range of motion joints.  The brace will safely protect the patient and help to facilitate healing by stabilizing and controlling the knee.  In order to prevent skin breakdown along bony prominences and to avoid compression of the peroneal nerve, a custom fit is necessary.\par

## 2021-01-14 NOTE — ADDENDUM
[FreeTextEntry1] : This note was written by Julia Hawthorne on 01/13/2021 acting as a scribe for LIVIA HERNANDEZ III, MD

## 2021-01-14 NOTE — PHYSICAL EXAM
[de-identified] : Right Knee: \par Range of Motion in Degrees	\par 	                  Claimant:	Normal:	\par Flexion Active	  110 	                135-degrees	\par Flexion Passive	  110	                135-degrees	\par Extension Active	   10	                0-5-degrees	\par Extension Passive	   10	                0-5-degrees	\par \par No weakness to flexion/extension.  No evidence of instability in the AP plane or varus or valgus stress.  Negative  Lachman.  Negative pivot shift.  Negative anterior drawer test.  Negative posterior drawer test.  Negative Kael.  Negative Apley grind.  No medial or lateral joint line tenderness.  Positive tenderness over the medial and lateral facet of the patella.  Positive patellofemoral crepitations.  No lateral tilting patella.  No patella apprehension.  Positive crepitation in the medial and lateral femoral condyle.  No proximal or distal swelling, edema or tenderness.  No gross motor or sensory deficits.  Mild intra-articular swelling.  2+ DP and PT pulses.  Moderate varus deformity.  Skin is intact.  No rashes, scars or lesions. \par \par Left Knee: \par Range of Motion in Degrees	\par 	                  Claimant:	Normal:	\par Flexion Active	  100	                135-degrees	\par Flexion Passive	  100	                135-degrees	\par Extension Active	   20	                0-5-degrees	\par Extension Passive	   20	                0-5-degrees	\par \par Significant instability.  No weakness to flexion/extension.  No evidence of instability in the AP plane or varus or valgus stress.  Negative  Lachman.  Negative pivot shift.  Negative anterior drawer test.  Negative posterior drawer test.  Negative Kael.  Negative Apley grind.  No medial or lateral joint line tenderness.  Positive tenderness over the medial and lateral facet of the patella.  Positive patellofemoral crepitations.  No lateral tilting patella.  No patella apprehension.  Positive crepitation in the medial and lateral femoral condyle.  No proximal or distal swelling, edema or tenderness.  No gross motor or sensory deficits.  Mild intra-articular swelling.  2+ DP and PT pulses.  Moderate varus deformity.  Skin is intact.  No rashes, scars or lesions. \par  [de-identified] : Ambulating with a slightly antalgic to antalgic gait.  Station:  Normal.  [de-identified] : Appearance:  Well-developed, well-nourished male in no acute distress.\par   [de-identified] : Outside radiographs show severe arthritic changes and severe varus deformity.

## 2021-01-14 NOTE — CONSULT LETTER
[Dear  ___] : Dear  [unfilled], [FreeTextEntry1] : I had the pleasure of evaluating your patient, Zbigniew Schmitz.\par \par Thank you very much for allowing me to participate in the care of this patient.\par Please see my note below.\par \par If you have any questions, please do not hesitate to contact me.\par \par Sincerely,\par \par \par Ronald Moran III, MD\par RYLEY/sg

## 2021-01-14 NOTE — HISTORY OF PRESENT ILLNESS
[de-identified] : The patient comes in today with complaints of pain to his bilateral knees.  He had x-rays taken elsewhere.  He had a prior surgery and he has severe bilateral varus deformities.  The patient indicates a pain level of 8 on a pain scale of 0-10.

## 2021-03-23 ENCOUNTER — OUTPATIENT (OUTPATIENT)
Dept: OUTPATIENT SERVICES | Facility: HOSPITAL | Age: 71
LOS: 1 days | End: 2021-03-23
Payer: MEDICAID

## 2021-03-23 DIAGNOSIS — Z20.828 CONTACT WITH AND (SUSPECTED) EXPOSURE TO OTHER VIRAL COMMUNICABLE DISEASES: ICD-10-CM

## 2021-03-23 DIAGNOSIS — Z98.890 OTHER SPECIFIED POSTPROCEDURAL STATES: Chronic | ICD-10-CM

## 2021-03-23 DIAGNOSIS — S89.90XA UNSPECIFIED INJURY OF UNSPECIFIED LOWER LEG, INITIAL ENCOUNTER: Chronic | ICD-10-CM

## 2021-03-23 LAB — SARS-COV-2 RNA SPEC QL NAA+PROBE: SIGNIFICANT CHANGE UP

## 2021-03-23 PROCEDURE — U0005: CPT

## 2021-03-23 PROCEDURE — C9803: CPT

## 2021-03-23 PROCEDURE — U0003: CPT

## 2021-03-24 DIAGNOSIS — Z20.828 CONTACT WITH AND (SUSPECTED) EXPOSURE TO OTHER VIRAL COMMUNICABLE DISEASES: ICD-10-CM

## 2021-03-26 NOTE — ED ADULT NURSE REASSESSMENT NOTE - NS ED NURSE REASSESS COMMENT FT1
Patient is rescheduling EGD & Colonoscopy with Dr. Juárez from 3.26.2021 to 4.16.2021. COVID test on 4.14.2021. Patient resting comfortably in bed. BP improved, denies pain/discomfort at this time. No s/s of distress present. Hand-off report to JANA Bolaños, transport to . Safety & comfort measures in place, hourly rounding on my time.

## 2021-05-28 NOTE — ASU PREOP CHECKLIST - BP NONINVASIVE DIASTOLIC (MM HG)
Date of Service: 2021    INFECTIOUS DISEASES CONSULTATION     REQUESTING PHYSICIAN:    Moustapha Stein DO.    CHIEF COMPLAINT:  Empyema.    HISTORY OF PRESENT ILLNESS:  The patient is a 55-year-old gentleman with Down syndrome and cognitive disabilities, who was admitted to [facility] in [place] and was found to have an empyema.  The patient had a thoracentesis on 2021.  His date of admission to the hospital was 2021.  The patient was brought in by caregivers at the group home where he resides to the ED complaining of increased cough and chest pain.  They noted he appeared more short of breath in the last week and his cough and chest discomfort had been increasing.  In the ED, he was found to have a leukocytosis with a white count of 14,700.  A chest CT scan was done, which showed loculated pleural fluids.  He was started on Aztreonam and Metronidazole.  There was no history of nausea, vomiting or diarrhea.  There was no history of aspiration.  There is no history of any dental problems.  The patient cannot give any history.  He is largely noncommunicative, although he is alert.     PAST MEDICAL HISTORY INCLUDES THE FOLLOWIN.  Down syndrome.  2.  Alzheimer's with dementia.  3.  Anxiety.  4.  Hypothyroidism.  5.  Lupus.  6.  History of meningitis.  7.  Pneumonia.  8.  Psoriasis.  9.  History of systolic murmur, grade 2/6.  10.  Urinary incontinence.      ALLERGIES:  History includes Erythromycin and Penicillins, reactions are not noted in the chart.  The patient cannot give any history regarding this and his sister, who was present, knows nothing about it.    CURRENT MEDICATIONS:  Aztreonam.    Polysporin ointment.  Donepezil.  Levothyroxine.  Loratadine.  Memantine.  Metronidazole.  Mirtazapine.  Pravastatin.  Triamcinolone.  Vancomycin.    SOCIAL HISTORY:  Nonsmoker, nondrinker.    FAMILY HISTORY:  Positive for hypertension, diabetes, kidney problems, heart disease, COPD and the patient is  adopted.  I am not certain if the family history listed in the chart is his biological family history or his adopted family.    PHYSICAL EXAMINATION:  GENERAL:  This is a very difficult exam.  The patient is not very cooperative at all.  He is a well-developed, well-nourished appearing male with obvious signs of Down syndrome.  VITAL SIGNS:  Temperature max 98.3, currently 98, blood pressure is 115/57 with a heart rate of 71, respiratory rate of 18, O2 saturation 97%.  SKIN:  I do not see any splinter hemorrhages, Osler's nodes or Janeway's lesions.  HEENT:  Down syndrome facies are apparent.  There is no scleral icterus.  I was able to peek at his oropharynx for about 1-2 seconds at the most and only could see his upper teeth, which appear to be in fairly good repair, although oral hygiene is not the best.  I could not see his lower teeth.  I did not notice any thrush or tonsillar injection or purulence.  LUNGS:  Clear anteriorly, effort poor.  HEART:  Regular rhythm, I cannot appreciate a murmur.  ABDOMEN:  Soft, nontender, there is no hepatosplenomegaly, rebound or guarding.  Bowel sounds present.  EXTREMITIES:  No edema, clubbing or cyanosis.  Pedal pulses are palpable.  NEUROLOGIC:  The patient cannot cooperate with this.  He moves all extremities.  He gets agitated when the nurse tried to put in an IV today.    LABS:   White blood cell count on admission was 14,700.  His last white blood cell count was on the 25th and was 9400.  I do not see a CBC since then.  Creatinine 0.94.  LDH is 143.  Hematocrit 37%.  Platelet count is 402,000.  His last differential was on 05/23/2021 and that was 75 segs, 12 lymphs, 11 monocytes, 1 basophil.  D-dimer was 3.70.  Procalcitonin was 0.13.      Blood cultures are negative.  Pleural fluid culture is as stated above.  Mycobacterial culture and fungal cultures are pending.  COVID was negative.  Urinalysis showed no signs of infection with 1-2 red cells, 1-5 white cells and no  bacteria.  Pleural fluid analysis showed a glucose of less than 10, an LDH of over 1000, white cells 769 and a pH of 6.45, all consistent with an empyema.      CT chest was reviewed.  Swallow evaluation is pending.  Chest x-rays were reviewed.  Chest x-ray shows improvement at the left base since his thoracentesis.     IMPRESSION:  A 55-year-old male with empyema.  This appears to be of oral dental source considering the organism we are growing, which is Eikenella, a very typical odontogenic organism.  Unfortunately, the treatment of choice is Penicillin and other treatments are less well accepted.  There is no randomized control trial of this organism.  It is resistant to Clindamycin and Metronidazole for the most part.  We do not know the nature of his allergy.  His cognitive impairment adds to the difficulty in treating him.    PLAN:  1.  Desensitize to Ampicillin.  2.  If he tolerates this, will start Ampicillin Sulbactam 3 grams IV every 6 hours.  3.  If we are able to start the Amp/Sulbactam, will discontinue the Metronidazole and continue Vancomycin and Aztreonam and reassess in the morning.    4.  If he does not tolerate Ampicillin desensitization, will use Imipenem.  There are some case reports using it successfully.  5.  Agree with CT surgery planned for tomorrow for decortication.    Thank you for asking me to see this patient in consultation.  We will follow with you.      Dictated By: Rajni Reza MD  Signing Provider: MD MOSES Perkins/hai ([id number])  DD: 05/28/2021 17:14:46 TD: 05/28/2021 17:43:43    Copy Sent To:     cc: Moustapha Stein DO     101

## 2021-06-15 ENCOUNTER — APPOINTMENT (OUTPATIENT)
Dept: OTOLARYNGOLOGY | Facility: CLINIC | Age: 71
End: 2021-06-15

## 2021-09-14 ENCOUNTER — EMERGENCY (EMERGENCY)
Facility: HOSPITAL | Age: 71
LOS: 0 days | Discharge: HOME | End: 2021-09-14
Attending: EMERGENCY MEDICINE
Payer: MEDICAID

## 2021-09-14 VITALS
HEIGHT: 64 IN | WEIGHT: 179.9 LBS | SYSTOLIC BLOOD PRESSURE: 144 MMHG | HEART RATE: 115 BPM | OXYGEN SATURATION: 96 % | TEMPERATURE: 99 F | DIASTOLIC BLOOD PRESSURE: 87 MMHG | RESPIRATION RATE: 18 BRPM

## 2021-09-14 DIAGNOSIS — S89.90XA UNSPECIFIED INJURY OF UNSPECIFIED LOWER LEG, INITIAL ENCOUNTER: Chronic | ICD-10-CM

## 2021-09-14 DIAGNOSIS — Z79.899 OTHER LONG TERM (CURRENT) DRUG THERAPY: ICD-10-CM

## 2021-09-14 DIAGNOSIS — Z87.448 PERSONAL HISTORY OF OTHER DISEASES OF URINARY SYSTEM: ICD-10-CM

## 2021-09-14 DIAGNOSIS — H57.11 OCULAR PAIN, RIGHT EYE: ICD-10-CM

## 2021-09-14 DIAGNOSIS — H10.9 UNSPECIFIED CONJUNCTIVITIS: ICD-10-CM

## 2021-09-14 DIAGNOSIS — Z86.19 PERSONAL HISTORY OF OTHER INFECTIOUS AND PARASITIC DISEASES: ICD-10-CM

## 2021-09-14 DIAGNOSIS — E78.5 HYPERLIPIDEMIA, UNSPECIFIED: ICD-10-CM

## 2021-09-14 DIAGNOSIS — Z86.2 PERSONAL HISTORY OF DISEASES OF THE BLOOD AND BLOOD-FORMING ORGANS AND CERTAIN DISORDERS INVOLVING THE IMMUNE MECHANISM: ICD-10-CM

## 2021-09-14 DIAGNOSIS — Z98.890 OTHER SPECIFIED POSTPROCEDURAL STATES: Chronic | ICD-10-CM

## 2021-09-14 DIAGNOSIS — N40.0 BENIGN PROSTATIC HYPERPLASIA WITHOUT LOWER URINARY TRACT SYMPTOMS: ICD-10-CM

## 2021-09-14 DIAGNOSIS — H57.9 UNSPECIFIED DISORDER OF EYE AND ADNEXA: ICD-10-CM

## 2021-09-14 DIAGNOSIS — I10 ESSENTIAL (PRIMARY) HYPERTENSION: ICD-10-CM

## 2021-09-14 PROCEDURE — 99283 EMERGENCY DEPT VISIT LOW MDM: CPT

## 2021-09-14 RX ORDER — CIPROFLOXACIN HCL 0.3 %
2 DROPS OPHTHALMIC (EYE) ONCE
Refills: 0 | Status: COMPLETED | OUTPATIENT
Start: 2021-09-14 | End: 2021-09-14

## 2021-09-14 RX ORDER — POLYMYXIN B SULF/TRIMETHOPRIM 10000-1/ML
2 DROPS OPHTHALMIC (EYE)
Qty: 1 | Refills: 0
Start: 2021-09-14 | End: 2021-09-20

## 2021-09-14 RX ORDER — ERYTHROMYCIN BASE 5 MG/GRAM
1 OINTMENT (GRAM) OPHTHALMIC (EYE)
Qty: 7 | Refills: 0
Start: 2021-09-14 | End: 2021-09-20

## 2021-09-14 RX ORDER — FLUORESCEIN SODIUM 9 MG
1 STRIP OPHTHALMIC (EYE) ONCE
Refills: 0 | Status: COMPLETED | OUTPATIENT
Start: 2021-09-14 | End: 2021-09-14

## 2021-09-14 RX ORDER — ERYTHROMYCIN BASE 5 MG/GRAM
1 OINTMENT (GRAM) OPHTHALMIC (EYE) ONCE
Refills: 0 | Status: COMPLETED | OUTPATIENT
Start: 2021-09-14 | End: 2021-09-14

## 2021-09-14 RX ADMIN — Medication 2 DROP(S): at 11:20

## 2021-09-14 RX ADMIN — Medication 2 DROP(S): at 11:17

## 2021-09-14 RX ADMIN — Medication 1 APPLICATION(S): at 11:17

## 2021-09-14 RX ADMIN — Medication 1 APPLICATION(S): at 11:20

## 2021-09-14 NOTE — ED STATDOCS - PATIENT PORTAL LINK FT
You can access the FollowMyHealth Patient Portal offered by [facility] by registering at the following website: [email or web address]. By joining "[employer]’s FollowMyHealth portal, you will also be able to view your health information using other applications (apps) compatible with our system.

## 2021-09-14 NOTE — ED PROVIDER NOTE - NSFOLLOWUPINSTRUCTIONS_ED_ALL_ED_FT
Conjunctivitis    WHAT YOU NEED TO KNOW:  Conjunctivitis, or pink eye, is inflammation of your conjunctiva. The conjunctiva is a thin tissue that covers the front of your eye and the back of your eyelids. The conjunctiva helps protect your eye and keep it moist. Conjunctivitis may be caused by bacteria, allergies, or a virus. If your conjunctivitis is caused by bacteria, it may get better on its own in about 7 days. Viral conjunctivitis can last up to 3 weeks.     DISCHARGE INSTRUCTIONS:    Return to the emergency department if:   •You have worsening eye pain.   •The swelling in your eye gets worse, even after treatment.   •Your vision suddenly becomes worse or you cannot see at all.    Contact your healthcare provider if:   •You develop a fever and ear pain.  •You have tiny bumps or spots of blood on your eye.  •You have questions or concerns about your condition or care.    Manage your symptoms:   •Apply a cool compress. Wet a washcloth with cold water and place it on your eye. This will help decrease itching and irritation.  •Do not wear contact lenses. They can irritate your eye. Throw away the pair you are using and ask when you can wear them again. Use a new pair of lenses when your healthcare provider says it is okay.   •Avoid irritants. Stay away from smoke filled areas. Shield your eyes from wind and sun.   •Flush your eye. You may need to flush your eye with saline to help decrease your symptoms. Ask for more information on how to flush your eye.     Medicines: Treatment depends on what is causing your conjunctivitis. You maybe given any of the following:  •Allergy medicine helps decrease itchy, red, swollen eyes caused by allergies. It may be given as a pill, eye drops, or nasal spray.  •Antibiotics may be needed if your conjunctivitis is caused by bacteria. This medicine may be given as a pill, eye drops, or eye ointment.  •Take your medicine as directed. Contact your healthcare provider if you think your medicine is not helping or if you have side effects. Tell him or her if you are allergic to any medicine. Keep a list of the medicines, vitamins, and herbs you take. Include the amounts, and when and why you take them. Bring the list or the pill bottles to follow-up visits. Carry your medicine list with you in case of an emergency.    Prevent the spread of conjunctivitis:   •Wash your hands with soap and water often. Wash your hands before and after you touch your eyes. Also wash your hands before you prepare or eat food and after you use the bathroom or change a diaper.  •Avoid allergens. Try to avoid the things that cause your allergies, such as pets, dust, or grass.   •Avoid contact with others. Do not share towels or washcloths. Try to stay away from others as much as possible. Ask when you can return to work or school.   •Throw away eye makeup. The bacteria that caused your conjunctivitis can stay in eye makeup. Throw away mascara and other eye makeup.    -----    Conjuntivitis    LO QUE NECESITA SABER:  La conjuntivitis es la inflamación de la conjuntiva. La conjuntiva es el tejido corral que cubre la parte frontal de ragland edmundo y la parte posterior de jerod párpados. La conjuntiva ayuda a proteger ragland edmundo y a mantenerlo húmedo. La conjuntivitis podría ser a causa de ryan bacteria, alergias o de un virus. Si ragland conjuntivitis es a causa de ryan bacteria, podría mejorar por sí luciano en aproximadamente 7 calos. La conjuntivitis viral puede durar hasta 3 semanas.    INSTRUCCIONES SOBRE EL JEFF HOSPITALARIA:    Regrese a la frances de emergencias si:  •Ragland dolor de edmundo empeora.  •La inflamación en jerod ojos empeora, aún después del tratamiento.  •Ragland visión empeora repentinamente o usted no puede paco en lo absoluto.      Comuníquese con ragland médico si:  •Usted presenta fiebre o dolor de oído.  •Usted tiene bultos o manchas de rush pequeñas en ragland edmundo.  •Usted tiene preguntas o inquietudes acerca de ragland condición o cuidado.  El manejo de jerod síntomas:  •Aplique ryan compresa fría.Moje ryan toalla con agua fría y colóquela sobre ragland edmundo. Elmore ayuda a disminuir la comezón e irritación.  •No use lentes de contacto.Ellos podrían irritar jerod ojos. Deseche el par que está usando y pregunte cuándo puede usarlos otra vez. Use un par de lentes nuevos cuando ragland médico lo autorice.  •Evite los irritantes.Aléjese de las áreas llenas de humo. Proteja jerod ojos del aire y del sol.  •Enjuague ragland edmundo.Es posible que necesite enjuagar ragland edmundo con solución salina para ayudar a disminuir jerod síntomas. Pida más información sobre cómo enjuagar ragland edmundo.  Medicamentos:El tratamiento depende de lo que está provocando la conjuntivitis. A usted  podrían  darle alguno de lo siguientes:  •Medicamentos para la alergiaayuda a disminuir la comezón, el enrojecimiento y la inflamación de jerod ojos a causa de las alergias. Se lo podrían ken en forma de píldora, gotas para los ojos o atomizador nasal.  •Los antibióticospodrían ser necesarios si ragland conjuntivitis es a causa de ryan bacteria. Sarah medicamento podría ser en forma de píldora, gotas o pomada para los ojos.      •Hollis jerod medicamentos radha se le haya indicado.Consulte con ragland médico si usted kalyn que ragland medicamento no le está ayudando o si presenta efectos secundarios. Infórmele si es alérgico a cualquier medicamento. Mantenga ryan lista actualizada de los medicamentos, las vitaminas y los productos herbales que john. Incluya los siguientes datos de los medicamentos: cantidad, frecuencia y motivo de administración. Traiga con usted la lista o los envases de las píldoras a jerod citas de seguimiento. Lleve la lista de los medicamentos con usted en azeb de ryan emergencia.      Evite la propagación de la conjuntivitis:  •Lávese jerod roger con jabón y agua con frecuencia.Lávese las roger antes y después de tocarse los ojos. También lávese jerod roger antes de preparar o comer alimentos y después de usar el baño o cambiar un pañal.      •Evite los alérgenos.Trate de evitar las cosas que le provoquen alergias, radha las mascotas, polvo o pasto.      •Evite el contacto con otras personas.No comparta las toallas o paños. Trate de permanecer lejos de otras personas tanto radha sea posible. Pregunte cuándo puede regresar al trabajo o a clase.      •Deseche el maquillaje de ojos.La bacteria que provoca la conjuntivitis puede estar en el maquillaje de ojos. Deseche el rimel y otros maquillajes de ojos.

## 2021-09-14 NOTE — ED PROVIDER NOTE - PATIENT PORTAL LINK FT
You can access the FollowMyHealth Patient Portal offered by [facility] by registering at the following website: [email or web address]. By joining [employer]’s FollowMyHealth portal, you will also be able to view your health information using other applications (apps) compatible with our system.

## 2021-09-14 NOTE — ED STATDOCS - NSFOLLOWUPINSTRUCTIONS_ED_ALL_ED_FT
Conjunctivitis    WHAT YOU NEED TO KNOW:  Conjunctivitis, or pink eye, is inflammation of your conjunctiva. The conjunctiva is a thin tissue that covers the front of your eye and the back of your eyelids. The conjunctiva helps protect your eye and keep it moist. Conjunctivitis may be caused by bacteria, allergies, or a virus. If your conjunctivitis is caused by bacteria, it may get better on its own in about 7 days. Viral conjunctivitis can last up to 3 weeks.     DISCHARGE INSTRUCTIONS:    Return to the emergency department if:   •You have worsening eye pain.   •The swelling in your eye gets worse, even after treatment.   •Your vision suddenly becomes worse or you cannot see at all.      Contact your healthcare provider if:   •You develop a fever and ear pain.  •You have tiny bumps or spots of blood on your eye.  •You have questions or concerns about your condition or care.      Manage your symptoms:   •Apply a cool compress. Wet a washcloth with cold water and place it on your eye. This will help decrease itching and irritation.  •Do not wear contact lenses. They can irritate your eye. Throw away the pair you are using and ask when you can wear them again. Use a new pair of lenses when your healthcare provider says it is okay.   •Avoid irritants. Stay away from smoke filled areas. Shield your eyes from wind and sun.   •Flush your eye. You may need to flush your eye with saline to help decrease your symptoms. Ask for more information on how to flush your eye.       Medicines: Treatment depends on what is causing your conjunctivitis. You maybe given any of the following:  •Allergy medicine helps decrease itchy, red, swollen eyes caused by allergies. It may be given as a pill, eye drops, or nasal spray.  •Antibiotics may be needed if your conjunctivitis is caused by bacteria. This medicine may be given as a pill, eye drops, or eye ointment.  •Take your medicine as directed. Contact your healthcare provider if you think your medicine is not helping or if you have side effects. Tell him or her if you are allergic to any medicine. Keep a list of the medicines, vitamins, and herbs you take. Include the amounts, and when and why you take them. Bring the list or the pill bottles to follow-up visits. Carry your medicine list with you in case of an emergency.      Prevent the spread of conjunctivitis:   •Wash your hands with soap and water often. Wash your hands before and after you touch your eyes. Also wash your hands before you prepare or eat food and after you use the bathroom or change a diaper.  •Avoid allergens. Try to avoid the things that cause your allergies, such as pets, dust, or grass.   •Avoid contact with others. Do not share towels or washcloths. Try to stay away from others as much as possible. Ask when you can return to work or school.   •Throw away eye makeup. The bacteria that caused your conjunctivitis can stay in eye makeup. Throw away mascara and other eye makeup.    Conjuntivitis    LO QUE NECESITA SABER:  La conjuntivitis es la inflamación de la conjuntiva. La conjuntiva es el tejido corral que cubre la parte frontal de ragland edmundo y la parte posterior de jerod párpados. La conjuntiva ayuda a proteger ragland edmundo y a mantenerlo húmedo. La conjuntivitis podría ser a causa de ryan bacteria, alergias o de un virus. Si ragland conjuntivitis es a causa de ryan bacteria, podría mejorar por sí luciano en aproximadamente 7 calos. La conjuntivitis viral puede durar hasta 3 semanas.    INSTRUCCIONES SOBRE EL JEFF HOSPITALARIA:    Regrese a la frances de emergencias si:  •Ragland dolor de edmundo empeora.  •La inflamación en jerod ojos empeora, aún después del tratamiento.  •Ragland visión empeora repentinamente o usted no puede paco en lo absoluto.      Comuníquese con ragland médico si:  •Usted presenta fiebre o dolor de oído.  •Usted tiene bultos o manchas de rush pequeñas en ragland edmundo.  •Usted tiene preguntas o inquietudes acerca de ragland condición o cuidado.  El manejo de jerod síntomas:  •Aplique ryan compresa fría.Moje ryan toalla con agua fría y colóquela sobre ragland edmundo. [place] ayuda a disminuir la comezón e irritación.  •No use lentes de contacto.Ellos podrían irritar jerod ojos. Deseche el par que está usando y pregunte cuándo puede usarlos otra vez. Use un par de lentes nuevos cuando ragland médico lo autorice.  •Evite los irritantes.Aléjese de las áreas llenas de humo. Proteja jerod ojos del aire y del sol.  •Enjuague ragland edmundo.Es posible que necesite enjuagar ragland edmundo con solución salina para ayudar a disminuir jerod síntomas. Pida más información sobre cómo enjuagar ragland edmundo.  Medicamentos:El tratamiento depende de lo que está provocando la conjuntivitis. A usted  podrían  darle alguno de lo siguientes:  •Medicamentos para la alergiaayuda a disminuir la comezón, el enrojecimiento y la inflamación de jerod ojos a causa de las alergias. Se lo podrían ken en forma de píldora, gotas para los ojos o atomizador nasal.  •Los antibióticospodrían ser necesarios si ragland conjuntivitis es a causa de ryan bacteria. Sarah medicamento podría ser en forma de píldora, gotas o pomada para los ojos.      •Alva jerod medicamentos radha se le haya indicado.Consulte con ragland médico si usted kalyn que ragland medicamento no le está ayudando o si presenta efectos secundarios. Infórmele si es alérgico a cualquier medicamento. Mantenga ryan lista actualizada de los medicamentos, las vitaminas y los productos herbales que john. Incluya los siguientes datos de los medicamentos: cantidad, frecuencia y motivo de administración. Traiga con usted la lista o los envases de las píldoras a jerod citas de seguimiento. Lleve la lista de los medicamentos con usted en azeb de ryan emergencia.      Evite la propagación de la conjuntivitis:  •Lávese jerod roger con jabón y agua con frecuencia.Lávese las roger antes y después de tocarse los ojos. También lávese jerod roger antes de preparar o comer alimentos y después de usar el baño o cambiar un pañal.      •Evite los alérgenos.Trate de evitar las cosas que le provoquen alergias, radha las mascotas, polvo o pasto.      •Evite el contacto con otras personas.No comparta las toallas o paños. Trate de permanecer lejos de otras personas tanto radha sea posible. Pregunte cuándo puede regresar al trabajo o a clase.      •Deseche el maquillaje de ojos.La bacteria que provoca la conjuntivitis puede estar en el maquillaje de ojos. Deseche el rimel y otros maquillajes de ojos.

## 2021-09-14 NOTE — ED ADULT TRIAGE NOTE - CHIEF COMPLAINT QUOTE
pt presents to ed ambulatory for evaluation of right eye pain/redness upon waking up this morning. pt denies trauma. denies change in vision. pt a&ox4, be fast negative

## 2021-09-14 NOTE — ED PROVIDER NOTE - PROGRESS NOTE DETAILS
Roberto Cox, PGY3: Patient here for R eye pain. IOP 15R 16L. Pain improved w/ Tetracaine. Fluorescin exam without focal uptake. Will treat as conjunctivitis and send Polytrim and Erythromycin. Discussed return precautions and all questions answered. Pt in agreement w/ plan. CAOx3, NAD, VSS. Stable for d/c.  [id number] utilized for patient interaction. Roberto Cox, PGY3: Patient here for R eye pain. IOP 15R 16L. Pain improved w/ Tetracaine. Fluorescin exam without focal uptake. Will treat as conjunctivitis and send Polytrim and Erythromycin. Ophtho followup given. Discussed return precautions and all questions answered. Pt in agreement w/ plan. CAOx3, NAD, VSS. Stable for d/c.  [id number] utilized for patient interaction.

## 2021-09-14 NOTE — ED ADULT NURSE NOTE - OBJECTIVE STATEMENT
pt. c/o right eye pain since yesterday, pt. states burning pain upon blinking +blurry vision in right eye, able to see fingers from approx. 2 feet in multiple fields. Pt. states he put vicks vapor rub on forehead and used OTC eye drops without improvement, pt. had b/l cataract surgery last year, pt. eye appears red, dry+PERRLA, pt. denies trauma, injury or foreign body.

## 2021-09-14 NOTE — ED PROVIDER NOTE - NSPTACCESSSVCSAPPTDETAILS_ED_ALL_ED_FT
Please give 1 week follow up for ophthalmology if patient's symptoms do not improve at home with antibiotics.

## 2021-09-14 NOTE — ED STATDOCS - NSICDXPASTMEDICALHX_GEN_ALL_CORE_FT
PAST MEDICAL HISTORY:  BPH (benign prostatic hyperplasia)     H/O hepatitis     HLD (hyperlipidemia)     Hypertension, unspecified type     Iron deficiency anemia     Urinary retention

## 2021-09-14 NOTE — ED STATDOCS - OBJECTIVE STATEMENT
70 y/o male with a PMHx of BPH, HLD, hepatitis, HTN, iron deficiency anemia, urinary retention presents to the ED c/o right eye pain and itchiness since yesterday. No trauma to eye. Pt wears glasses. NKDA. o other complaints at this time.  ID#: [id number]. 72 y/o male with a PMHx of BPH, HLD, hepatitis, HTN, iron deficiency anemia, urinary retention presents to the ED c/o right eye pain and itchiness since yesterday. No trauma to eye. Pt wears glasses. NKDA. No other complaints at this time.  ID#: [id number].

## 2021-09-14 NOTE — ED STATDOCS - PROGRESS NOTE DETAILS
Roberto Cox, PGY3: Patient here for R eye pain. IOP 15R 16L. Pain improved w/ Tetracaine. Fluorescin exam without focal uptake. Will treat as conjunctivitis and send Polytrim and Erythromycin. Ophtho followup given. Discussed return precautions and all questions answered. Pt in agreement w/ plan. CAOx3, NAD, VSS. Stable for d/c.  [id number] utilized for patient interaction.

## 2021-09-14 NOTE — ED STATDOCS - EYES, MLM
clear bilaterally.  Pupils equal, round, and reactive to light. clear bilaterally.  Pupils equal, round, and reactive to light. Right eye injected. Subconjunctiva red with clear drainage. No pain with EOMI. No TTP face

## 2021-09-14 NOTE — ED STATDOCS - ATTENDING CONTRIBUTION TO CARE
I, Danica Holden MD, personally saw the patient with the resident, and completed the key components of the history and physical exam. I then discussed the management plan with the resident.

## 2021-11-06 NOTE — ED STATDOCS - PROGRESS NOTE
You can access the FollowMyHealth Patient Portal offered by [facility] by registering at the following website: [email or web address]. By joining "[employer]’s FollowMyHealth portal, you will also be able to view your health information using other applications (apps) compatible with our system. Stable.

## 2021-12-01 PROCEDURE — G9005: CPT

## 2022-02-23 NOTE — ED PROVIDER NOTE - CROS ED GI ALL NEG
Transition of care, [facility] apt.  [facility]  02/22/2022  Cc: Abdominal pain.  Discharge Dx: Right lower quadrant abdominal pain.   Eliseo Lang MD - 02/22/2022    Formatting of this note might be different from the original.  Today you were evaluated for abdominal pain. Your work-up did not reveal an exact cause for your symptoms. We believe that you can follow-up with your regular doctor and your gastroenterologist. If you develop any new or concerning symptoms or if you get worse, please return to the emergency department. Otherwise, you should reach out to set up this appointment for soon as possible.      Patient is still in excruciating pain, unable to come in today.  Hasn't been able to see any GI's with her insurance.  (Informed patient we have a select handful of GI's that will take her insurance, Dr. Patel, Dr. Veloz, Dr. Musa Heredia who may be able to see her soon with her insurance).  Looking for guidance, not sure what to do anymore with this ongoing pain.  Reconsidering returning to ED.     - - -

## 2022-05-16 NOTE — CONSULT NOTE ADULT - I WAS PHYSICALLY PRESENT FOR THE KEY PORTIONS OF THE EVALUATION AND MANAGEMENT (E/M) SERVICE PROVIDED.  I AGREE WITH THE ABOVE HISTORY, PHYSICAL, AND PLAN WHICH I HAVE REVIEWED AND EDITED WHERE APPROPRIATE
Statement Selected Hydroxyzine Counseling: Patient advised that the medication is sedating and not to drive a car after taking this medication.  Patient informed of potential adverse effects including but not limited to dry mouth, urinary retention, and blurry vision.  The patient verbalized understanding of the proper use and possible adverse effects of hydroxyzine.  All of the patient's questions and concerns were addressed.

## 2022-05-26 NOTE — PROGRESS NOTE ADULT - SUBJECTIVE AND OBJECTIVE BOX
· Assessment	  68 y/o M PMHx significant for hypertension, hyperlipidemia, BPH, presents to the ED after reportedly being notified concerning an abnormal lab result. The patient was evaluated at  2 days prior to admission for urine retention and suprapubic discomfort. The patient's Peterson catheter was changed as was deemed non-functional at that time. Urine C+S were found to be positive for ESBL. Upon arrival the patient denied any further symptoms of subjective fevers, chills, nausea, or vomiting. As noted the patient is being evaluated by Urology for possible elective TURP. In the ED the patient received Meropenem 1g IVPB x 1, and NS x 1L.    6.16: no distress      REVIEW OF SYSTEMS:    CONSTITUTIONAL: No weakness, No fevers or chills  ENT: No ear ache, No sorethroat  NECK: No pain, No stiffness  RESPIRATORY: No cough, No wheezing, No hemoptysis; No dyspnea  CARDIOVASCULAR: No chest pain, No palpitations  GASTROINTESTINAL: No abd pain, No nausea, No vomiting, No hematemesis, No diarrhea or constipation. No melena, No hematochezia.  GENITOURINARY: No dysuria, No  hematuria, +peterson  NEUROLOGICAL: No diplopia, No paresthesia, No motor dysfunction  MUSCULOSKELETAL: No arthralgia, No myalgia  SKIN: No rashes, or lesions   PSYCH: no anxiety, no suicidal ideation    All other review of systems is negative unless indicated above    Vital Signs Last 24 Hrs  T(C): 37.1 (16 Jun 2019 05:31), Max: 37.1 (15 Rufino 2019 21:03)  T(F): 98.8 (16 Jun 2019 05:31), Max: 98.8 (16 Jun 2019 05:31)  HR: 74 (16 Jun 2019 11:22) (71 - 93)  BP: 126/89 (16 Jun 2019 11:22) (114/74 - 135/84)  RR: 18 (16 Jun 2019 11:22) (18 - 18)  SpO2: 95% (16 Jun 2019 11:22) (94% - 95%)    PHYSICAL EXAM:    GENERAL: NAD, Well nourished  HEENT:  NC/AT, EOMI, PERRLA, No scleral icterus, Moist mucous membranes  NECK: Supple, No JVD  CNS:  Alert & Oriented X3, Motor Strength 5/5 B/L upper and lower extremities; DTRs 2+ intact   LUNG: Normal Breath sounds, Clear to auscultation bilaterally, No rales, No rhonchi, No wheezing  HEART: RRR; No murmurs, No rubs  ABDOMEN: +BS, ST/ND/NT  GENITOURINARY: Voiding, Bladder not distended, clear urine in peterson bag  EXTREMITIES:  2+ Peripheral Pulses, No clubbing, No cyanosis, No tibial edema, +swelling present on Left dorsum of L hand, no erythema, no fluctuance  MUSCULOSKELTAL: Joints normal ROM, No TTP, No effusion  SKIN: no rashes  RECTAL: deferred, not indicated  BREAST: deferred                          11.4   6.63  )-----------( 403      ( 15 Rufino 2019 06:23 )             35.4     06-15    140  |  106  |  8   ----------------------------<  79  3.6   |  27  |  0.83    Ca    8.7      15 Rufino 2019 06:23  Mg     1.9     06-15    TPro  6.9  /  Alb  3.1<L>  /  TBili  0.4  /  DBili  x   /  AST  21  /  ALT  18  /  AlkPhos  58  06-15      MEDICATIONS  (STANDING):  amLODIPine   Tablet 10 milliGRAM(s) Oral daily  atorvastatin 10 milliGRAM(s) Oral at bedtime  finasteride 5 milliGRAM(s) Oral daily  meropenem  IVPB 1000 milliGRAM(s) IV Intermittent every 8 hours  metoprolol tartrate 50 milliGRAM(s) Oral two times a day  tamsulosin 0.4 milliGRAM(s) Oral at bedtime    MEDICATIONS  (PRN):  docusate sodium 100 milliGRAM(s) Oral three times a day PRN Constipation  ondansetron Injectable 4 milliGRAM(s) IV Push every 6 hours PRN Nausea  oxyCODONE    IR 5 milliGRAM(s) Oral every 6 hours PRN Moderate Pain (4 - 6)  senna 2 Tablet(s) Oral at bedtime PRN Constipation      all labs reviewed  all imaging reviewed      1. Acute Complicated Cystitis due to a MDRO    ~f/u PAN C+S  ~will cont. Meropenem for now day#2  ~ ID consultation   ~cont. contact isolation  ~strict I/Os    2. BPH  ~cont. Peterson  ~cont. Tamsulosin 0.4mg po qhs  ~cont. Finasteride 5mg po daily    3. Hypertension  ~cont. Metoprolol 50mg po q12h    4. Hyperlipidemia  ~cont. statin therapy w/ Atorvastatin 10mg po qhs    5. L hand injury: Xray [facility]

## 2022-06-07 ENCOUNTER — NON-APPOINTMENT (OUTPATIENT)
Age: 72
End: 2022-06-07

## 2022-06-07 DIAGNOSIS — R63.5 ABNORMAL WEIGHT GAIN: ICD-10-CM

## 2022-06-07 DIAGNOSIS — Z82.49 FAMILY HISTORY OF ISCHEMIC HEART DISEASE AND OTHER DISEASES OF THE CIRCULATORY SYSTEM: ICD-10-CM

## 2022-06-07 DIAGNOSIS — E78.5 HYPERLIPIDEMIA, UNSPECIFIED: ICD-10-CM

## 2022-06-07 RX ORDER — METFORMIN ER 500 MG 500 MG/1
500 TABLET ORAL
Qty: 30 | Refills: 2 | Status: ACTIVE | COMMUNITY
Start: 2022-06-07

## 2022-06-07 RX ORDER — BETHANECHOL CHLORIDE 50 MG/1
50 TABLET ORAL
Qty: 60 | Refills: 3 | Status: DISCONTINUED | COMMUNITY
Start: 2019-05-02 | End: 2022-06-07

## 2022-06-07 RX ORDER — TADALAFIL 10 MG/1
10 TABLET, FILM COATED ORAL
Qty: 6 | Refills: 5 | Status: DISCONTINUED | OUTPATIENT
Start: 2020-06-23 | End: 2022-06-07

## 2022-06-07 RX ORDER — AMLODIPINE BESYLATE 10 MG/1
10 TABLET ORAL DAILY
Qty: 90 | Refills: 3 | Status: ACTIVE | COMMUNITY
Start: 2022-06-07

## 2022-06-07 RX ORDER — ATORVASTATIN CALCIUM 40 MG/1
40 TABLET, FILM COATED ORAL
Qty: 90 | Refills: 1 | Status: ACTIVE | COMMUNITY
Start: 2022-06-07

## 2022-06-07 RX ORDER — FINASTERIDE 5 MG/1
5 TABLET, FILM COATED ORAL DAILY
Qty: 90 | Refills: 3 | Status: DISCONTINUED | COMMUNITY
Start: 2019-05-16 | End: 2022-06-07

## 2022-06-07 RX ORDER — SODIUM HYALURONATE INTRA-ARTICULAR SOLN PREF SYR 25 MG/2.5ML 25/2.5 MG/ML
25 SOLUTION PREFILLED SYRINGE INTRAARTICULAR
Qty: 10 | Refills: 0 | Status: DISCONTINUED | OUTPATIENT
Start: 2021-01-06 | End: 2022-06-07

## 2022-06-07 RX ORDER — MELOXICAM 15 MG/1
15 TABLET ORAL DAILY
Qty: 30 | Refills: 1 | Status: DISCONTINUED | COMMUNITY
Start: 2020-12-23 | End: 2022-06-07

## 2022-06-08 ENCOUNTER — APPOINTMENT (OUTPATIENT)
Dept: CARDIOLOGY | Facility: CLINIC | Age: 72
End: 2022-06-08
Payer: MEDICAID

## 2022-06-08 VITALS
SYSTOLIC BLOOD PRESSURE: 118 MMHG | HEIGHT: 64.96 IN | HEART RATE: 111 BPM | OXYGEN SATURATION: 95 % | DIASTOLIC BLOOD PRESSURE: 81 MMHG | WEIGHT: 198 LBS | BODY MASS INDEX: 32.99 KG/M2

## 2022-06-08 DIAGNOSIS — R06.00 DYSPNEA, UNSPECIFIED: ICD-10-CM

## 2022-06-08 PROCEDURE — 99204 OFFICE O/P NEW MOD 45 MIN: CPT | Mod: 25

## 2022-06-08 PROCEDURE — 93000 ELECTROCARDIOGRAM COMPLETE: CPT

## 2022-06-08 RX ORDER — METOPROLOL SUCCINATE 50 MG/1
50 TABLET, EXTENDED RELEASE ORAL
Qty: 90 | Refills: 3 | Status: ACTIVE | COMMUNITY

## 2022-06-09 NOTE — ASSESSMENT
[FreeTextEntry1] : A/P:\par \par *Dyspnea/fatigue\par -exercise stress echo\par -echo\par -BNP\par \par Return after testing to review results.\par

## 2022-06-09 NOTE — HISTORY OF PRESENT ILLNESS
[FreeTextEntry1] : Gopi ID# [id number]\par \par "dyspnea on exertion, Hx HTN, HLP, obese"\par from [facility]\par \par records from [facility]:\par ECG poor cquality sinus tachy 120s\par no ischemic changes.\par \par CBC hb 14, CRP 7 hi (>4), \par \par clinic note reviewed: "presents for FU..\par labs show worsening hypergly & h/o DM...\par Hx HTN, DM."\par \par Pt reports dyspnea onset 1 month ago.\par No PND, no orthopnea, L leg screw but no LE edema.\par Reports rapid weight gain 15 pds in last month.\par Reports feels tired when walks, not really shortness of breath.\par after 0.5 mile feels dyspneic not a problem a few months ago.\par No chest pain.  No palpitations, syncope, lightheadness.\par \par No prior cardiac surgeries or testing.\par No family history of cardiac disease.\par \par \par \par

## 2022-06-15 ENCOUNTER — APPOINTMENT (OUTPATIENT)
Dept: CARDIOLOGY | Facility: CLINIC | Age: 72
End: 2022-06-15

## 2022-07-11 ENCOUNTER — APPOINTMENT (OUTPATIENT)
Dept: CARDIOLOGY | Facility: CLINIC | Age: 72
End: 2022-07-11

## 2022-07-14 ENCOUNTER — APPOINTMENT (OUTPATIENT)
Dept: CARDIOLOGY | Facility: CLINIC | Age: 72
End: 2022-07-14

## 2022-08-29 NOTE — ED ADULT TRIAGE NOTE - BMI (KG/M2)
DR. Claudia Gutierrez at the bedside @ [phone]. DR. Baeza inserted wynn catheter @ 7867. AROM @ 1239 clear fluids, SVE 4-5/80%/-2. FSE and IUPC inserted by Dr. Claudia Gutierrez @ [phone]. Patient tina care done and patient repositioned into high fowlers @ 1250. Patient resting comfortably with ice chips. Call light within reach. 25.8

## 2022-10-11 NOTE — DISCHARGE NOTE PROVIDER - NSDCHHHOMEBOUNDOTHER_GEN_ALL_CORE_FT
PROCEDURE:  HEAD WO

 

INDICATIONS:  fall, head pain/injury

 

TECHNIQUE:  

Noncontrast 4.5 mm thick angled axial sections acquired from the foramen magnum to the vertex.  For r
adiation dose reduction, the following was used:  automated exposure control, adjustment of mA and/or
 kV according to patient size.

 

COMPARISON:  None.

 

FINDINGS:  

Image quality:  Excellent.  

 

CSF spaces:  Basal cisterns are patent.  No extra-axial fluid collections.  Ventricles are normal in 
size and shape.  

 

Brain:  No midline shift.  No intracranial masses or hemorrhage.  Gray-white matter interface is norm
al.  

 

Skull and face:  Calvarium and visualized facial bones are intact, without suspicious lesions.  

 

Sinuses:  Visualized sinuses and mastoids are clear.  

 

IMPRESSION:  No acute intracranial abnormality.

 

Reviewed by: Jordan Maria MD on 10/11/2022 4:05 PM PDT

Approved by: Jordan Maria MD on 10/11/2022 4:05 PM PDT

 

 

Station ID:  [id number] peterson care

## 2023-01-26 NOTE — ASU DISCHARGE PLAN (ADULT/PEDIATRIC) - FREQUENT HAND WASHING PREVENTS THE SPREAD OF INFECTION.
TREATMENT PLAN (Medication Management Only)        [facility]    Name and Date of Birth:  Tam Lamb 27 y o  2002  Date of Treatment Plan: January 26, 2023  Diagnosis/Diagnoses:    1  Moderate episode of recurrent major depressive disorder ([address] )    2  MARIA M (generalized anxiety disorder)    3  Avoidant-restrictive food intake disorder (ARFID)      Strengths/Personal Resources for Self-Care: supportive friends, ability to adapt to life changes, ability to communicate needs, ability to listen, ability to reason  Area/Areas of need (in own words): anxiety symptoms, depressive symptoms  1  Long Term Goal: decrease depression  Target Date:6 months - 7/26/2023  Person/Persons responsible for completion of goal: Rylee  2  Short Term Objective (s) - How will we reach this goal?:   A  Provider new recommended medication/dosage changes and/or continue medication(s): start zoloft  B  N/A   C  N/A  Target Date:6 months - 7/26/2023  Person/Persons Responsible for Completion of Goal: Rylee  Progress Towards Goals: initiating treatment  Treatment Modality: medication management every 3 months  Review due 180 days from date of this plan: 6 months - 7/26/2023  Expected length of service: ongoing treatment  My Physician/PA/NP and I have developed this plan together and I agree to work on the goals and objectives  I understand the treatment goals that were developed for my treatment 
Statement Selected

## 2023-01-31 NOTE — PROVIDER CONTACT NOTE (OTHER) - DATE AND TIME:
23-Mar-2019 19:25 Oral Minoxidil Counseling- I discussed with the patient the risks of oral minoxidil including but not limited to shortness of breath, swelling of the feet or ankles, dizziness, lightheadedness, unwanted hair growth and allergic reaction.  The patient verbalized understanding of the proper use and possible adverse effects of oral minoxidil.  All of the patient's questions and concerns were addressed.

## 2023-02-09 ENCOUNTER — APPOINTMENT (OUTPATIENT)
Dept: UROLOGY | Facility: CLINIC | Age: 73
End: 2023-02-09
Payer: MEDICAID

## 2023-02-09 VITALS
BODY MASS INDEX: 32.99 KG/M2 | TEMPERATURE: 98 F | RESPIRATION RATE: 15 BRPM | HEIGHT: 65 IN | OXYGEN SATURATION: 100 % | DIASTOLIC BLOOD PRESSURE: 87 MMHG | SYSTOLIC BLOOD PRESSURE: 178 MMHG | WEIGHT: 198 LBS | HEART RATE: 93 BPM

## 2023-02-09 PROCEDURE — 99213 OFFICE O/P EST LOW 20 MIN: CPT

## 2023-02-09 NOTE — PHYSICAL EXAM
[General Appearance - Well Developed] : well developed [General Appearance - Well Nourished] : well nourished [Normal Appearance] : normal appearance [Well Groomed] : well groomed [General Appearance - In No Acute Distress] : no acute distress [Abdomen Soft] : soft [Abdomen Tenderness] : non-tender [Costovertebral Angle Tenderness] : no ~M costovertebral angle tenderness [Urinary Bladder Findings] : the bladder was normal on palpation [Edema] : no peripheral edema [] : no respiratory distress [Respiration, Rhythm And Depth] : normal respiratory rhythm and effort [Exaggerated Use Of Accessory Muscles For Inspiration] : no accessory muscle use [Oriented To Time, Place, And Person] : oriented to person, place, and time [Affect] : the affect was normal [Mood] : the mood was normal [Not Anxious] : not anxious [No Focal Deficits] : no focal deficits [No Palpable Adenopathy] : no palpable adenopathy [Urethral Meatus] : meatus normal [Scrotum] : the scrotum was normal [Testes Mass (___cm)] : there were no testicular masses [No Prostate Nodules] : no prostate nodules [Prostate Size ___ gm] : prostate size [unfilled] gm [FreeTextEntry1] : walks with cane

## 2023-02-09 NOTE — HISTORY OF PRESENT ILLNESS
[FreeTextEntry1] : 70 yo M known to Dr Pemberton for BPH with urinary retention, chronic indwelling catheter. He underwent TURP 8/21/19, with successful TOV 8/29/19.\par \par 10/10/2019: Patient presents for follow up. He reports voiding well, feels he is emptying to completion. He does report urgency with occasional urge incontinence if he delays voiding. No hematuria, no dysuria, no frequency, no hesitancy, no straining. No fevers, no chills, no nausea, no vomiting, no flank pain. \par PVR (10/10/2019 ): 0 mL\par \par 06/23/2020: Patient presents for follow up. He reports voiding well, no new  symptoms and other medical  issues remain unchanged from above. No hematuria, no dysuria, no frequency, no urgency, no hesitancy, no straining. No incontinence. No fevers, no chills, no nausea, no vomiting, no flank pain. He does complain of ED since procedure. Has not tried any medications for this. He is cagey about specifics.\par \par 12/17/2020: Patient presents for follow up. He reports  symptoms remain well controlled, He says he is not sexually active, but does say the tadalafil was effective. No hematuria, no dysuria, no frequency, no urgency, no hesitancy, no straining. No incontinence. No fevers, no chills, no nausea, no vomiting, no flank pain. \par \par 02/09/2023: Patient presents for follow up. He returns to discuss elevated PSA> he denies any new symotoms apart from an occasional burning with concentrated urine. PSA 5.56 (02/2023)

## 2023-02-09 NOTE — REASON FOR VISIT
[Follow-up Visit ___] : a follow-up visit  for [unfilled] [[employer] ] : provided by [employer]   [Interpreters_IDNumber] : [id number] [Interpreters_FullName] : Bubba [TWNoteComboBox1] : [place]

## 2023-02-09 NOTE — ASSESSMENT
[FreeTextEntry1] : 73 yo M with elevated PSA to 5.65. Will enroll in [facility] for prostate MRI for fusion biopsy planning.
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
## 2023-02-16 NOTE — ED STATDOCS - NEUROLOGICAL, MLM
Patient arrived to treatment suite with spouse for blood draw, pre-medications and chemotherapy infusion. PIV started in left arm, blood drawn from site and sent to lab for processing. Patient states having a colonoscopy earlier in the week, and still having some diarrhea from that. Treatment approved and given. Patient tolerated well. Left treatment suite ambulatory. Discharge instructions provided. Patient's status assessed and documented appropriately. All labs and required results were also reviewed today. Treatment parameters have been reviewed. Today's treatment has been approved by the provider. Treatment orders and medication sequencing (when applicable) was verified by 2 registered nurses. The treatment plan was confirmed with the patient prior to administration, and the patient understands the need to report any treatment-related symptoms. Prior to administration, when applicable, the following 8 elements of medication administration were reviewed with 2nd Registered Nurse prior to dosing: drug name, drug dose, infusion volume when prepared in a syringe, rate of administration, expiration dates and/or times, appearance and integrity of drug(s), and rate of pump for infusion. The 5 rights of medication administration have been verified.
sensation is normal and strength is normal.

## 2023-02-22 ENCOUNTER — APPOINTMENT (OUTPATIENT)
Dept: UROLOGY | Facility: CLINIC | Age: 73
End: 2023-02-22
Payer: MEDICAID

## 2023-02-22 VITALS
DIASTOLIC BLOOD PRESSURE: 93 MMHG | WEIGHT: 180 LBS | HEART RATE: 89 BPM | BODY MASS INDEX: 29.99 KG/M2 | TEMPERATURE: 97.9 F | OXYGEN SATURATION: 94 % | SYSTOLIC BLOOD PRESSURE: 157 MMHG | HEIGHT: 65 IN | RESPIRATION RATE: 16 BRPM

## 2023-02-22 DIAGNOSIS — R97.20 ELEVATED PROSTATE, SPECIFIC ANTIGEN [PSA]: ICD-10-CM

## 2023-02-22 PROCEDURE — 99214 OFFICE O/P EST MOD 30 MIN: CPT

## 2023-02-22 RX ORDER — ENEMA 19; 7 G/133ML; G/133ML
7-19 ENEMA RECTAL
Qty: 1 | Refills: 0 | Status: ACTIVE | COMMUNITY
Start: 2023-02-22 | End: 1900-01-01

## 2023-02-22 NOTE — REVIEW OF SYSTEMS
[Nocturia] : nocturia [Strain or push to urinate] : strain or push to urinate [Negative] : Heme/Lymph [FreeTextEntry2] : high blood pressure

## 2023-02-22 NOTE — ASSESSMENT
[FreeTextEntry1] : YELENA VALENTINE  is a 73 year old gentleman w/ PMH HTN, HLD, DM presenting with elevated PSA. His PSA is 5.65ng/ml taken on 2/2/23. S/p TURP 8/21/19 w/ successful TOV. His historical PSAs are listed below. Pt has not had prostate MRI nor prostate biopsy. No family hx of  malignancies. Mother passed from cancer, pt not sure which type. \par \par He understands that many men with prostate cancer will die with the disease rather than of it. He also understands that PSA in and of itself does not diagnose prostate cancer but only assesses risk to a certain degree. The patient understands that to definitively screen for prostate cancer, a biopsy is required and this procedure has risks, including bleeding, infection, ED and urinary retention.  A MRI is ordered prior to possible biopsy, patient is aware to administer a fleet enema prior to MRI. \par \par 1. Schedule MRI - MRI needed for fusion biopsy \par 2. Schedule MRI review appointment with Dr. Sheriff\par \par

## 2023-02-22 NOTE — REASON FOR VISIT
[[employer] ] : provided by [employer]   [Initial Visit ___] : [unfilled] is here today for an initial visit  for [unfilled] [Family Member] : family member [Interpreters_IDNumber] : [id number] [Interpreters_FullName] : Saad [TWNoteComboBox1] : Sammarinese

## 2023-02-22 NOTE — HISTORY OF PRESENT ILLNESS
[FreeTextEntry1] : Dear Dr. TRAN (Urologist)\par Dear Dr LAZARO (PCP)\par \par Thank you so much for the referral to help care for your patient.\par \par Chief Complaint: Elevated PSA\par Date of first visit: 2/22/2023\par \par YELENA VALENTINE  is a 73 year old gentleman w/ PMH HTN, HLD, DM presenting with elevated PSA. His PSA is 5.65ng/ml taken on 2/2/23. S/p TURP 8/21/19 w/ successful TOV. His historical PSAs are listed below. Pt has not had prostate MRI nor prostate biopsy. No family hx of  malignancies. Mother passed from cancer, pt not sure which type. \par \par LUTS\par good stream, nocturiax3, reports a little straining\par frequency, urgency, incontinence, dribbling, hesitancy, intermittency\par \par PSA History\par 5.65 ng/ml on 2/2/2023\par 1.30 ng/ml (28%) on 12/21/2020\par \par MRI History\par n/a\par Biopsy History \par n/a\par \par The patient denies fevers, chills, nausea and or vomiting and no unexplained weight loss.\par \par All pertinent laboratory, films and physician notes were reviewed.  Questionnaire results were discussed with patient.\par \par 02/22/2023\par \par Prostate cancer screening: the patient and I spoke at length about prostate cancer screening, its risks and its benefits. The patient has 3(age, elevated PSA, family history) risk factors for prostate cancer.  He understands that many men with prostate cancer will die with the disease rather than of it and we also discussed the results large multi-center American and  prostate cancer screening trials. He also understands that PSA in and of itself does not diagnose prostate cancer but only assesses risk to a certain degree. The patient understands that to definitively screen for prostate cancer, a biopsy is required and this procedure has risks, including bleeding, infection, ED and urinary retention. The patient opted to proceed with a fusion biopsy.\par \par

## 2023-02-23 ENCOUNTER — NON-APPOINTMENT (OUTPATIENT)
Age: 73
End: 2023-02-23

## 2023-02-23 LAB
PSA FREE FLD-MCNC: 18 %
PSA FREE SERPL-MCNC: 0.93 NG/ML
PSA SERPL-MCNC: 5.17 NG/ML

## 2023-02-27 ENCOUNTER — NON-APPOINTMENT (OUTPATIENT)
Age: 73
End: 2023-02-27

## 2023-03-09 ENCOUNTER — NON-APPOINTMENT (OUTPATIENT)
Age: 73
End: 2023-03-09

## 2023-03-09 ENCOUNTER — APPOINTMENT (OUTPATIENT)
Dept: CARDIOLOGY | Facility: CLINIC | Age: 73
End: 2023-03-09

## 2023-03-29 ENCOUNTER — NON-APPOINTMENT (OUTPATIENT)
Age: 73
End: 2023-03-29

## 2023-04-21 ENCOUNTER — NON-APPOINTMENT (OUTPATIENT)
Age: 73
End: 2023-04-21

## 2023-09-15 NOTE — ED ADULT NURSE NOTE - CHPI ED NUR SYMPTOMS POS
- Please call the clinic if you have any questions. - Maintain social distancing practices. - Review your medications with your pharmacist to check for any drug interactions. - Follow up with the clinic or your primary care provider if you have any questions and/or concerns. - Dial 911 or proceed to the nearest Emergency Department if you experience any life-threatening issues.
PAIN/PAINFUL URINATION

## 2023-11-21 NOTE — ED PROVIDER NOTE - NS ED MD DISPO DISCHARGE CCDA
The EGD was ordered under future date of 1/1/2025 but should be actually scheduled within 4 weeks   Patient/Caregiver provided printed discharge information.

## 2024-03-26 NOTE — PHYSICAL EXAM
[General Appearance - Well Developed] : well developed [General Appearance - Well Nourished] : well nourished [Normal Appearance] : normal appearance [Edema] : no peripheral edema [Exaggerated Use Of Accessory Muscles For Inspiration] : no accessory muscle use [Skin Color & Pigmentation] : normal skin color and pigmentation [] : no rash [No Focal Deficits] : no focal deficits [Oriented To Time, Place, And Person] : oriented to person, place, and time [FreeTextEntry1] : Pt refused digital rectal exam and testicular exam 1-2 cups/cans per day

## 2024-05-15 NOTE — H&P ADULT - NSICDXPASTSURGICALHX_GEN_ALL_CORE_FT
As far as tests/procedures like mammograms, Pap smears, colon cancer screening, her mother does not believe she would be able to tolerate these tests much less interventions for these problems.  Thus, we will not order. However, she is overdue for dental care--likely would need anesthesia to tolerate exam/cleaning/procedures. I have investigated options previously. [facility] option but long waiting list. Dental School also an option, mom to call. [id number] today, rec updated COVID vaccine.  Had labs with Dr. Toure in 7/2023. No need to update now but can get repeat labs at next visit.    PAST SURGICAL HISTORY:  No significant past surgical history PAST SURGICAL HISTORY:  Leg injury s/p surgery

## 2025-05-07 NOTE — ED PROVIDER NOTE - CPE EDP CARDIAC NORM
[Vaccines Reviewed] : Immunizations reviewed today. Please see immunization details in the vaccine log within the immunization flowsheet. 
normal...